# Patient Record
Sex: MALE | Race: BLACK OR AFRICAN AMERICAN | NOT HISPANIC OR LATINO | Employment: OTHER | URBAN - NONMETROPOLITAN AREA
[De-identification: names, ages, dates, MRNs, and addresses within clinical notes are randomized per-mention and may not be internally consistent; named-entity substitution may affect disease eponyms.]

---

## 2017-01-11 ENCOUNTER — OFFICE VISIT (OUTPATIENT)
Dept: CARDIOLOGY | Facility: CLINIC | Age: 72
End: 2017-01-11

## 2017-01-11 VITALS
WEIGHT: 140 LBS | HEIGHT: 71 IN | SYSTOLIC BLOOD PRESSURE: 160 MMHG | BODY MASS INDEX: 19.6 KG/M2 | OXYGEN SATURATION: 98 % | DIASTOLIC BLOOD PRESSURE: 92 MMHG | HEART RATE: 85 BPM

## 2017-01-11 DIAGNOSIS — F17.200 SMOKER: ICD-10-CM

## 2017-01-11 DIAGNOSIS — N18.9 CKD (CHRONIC KIDNEY DISEASE), UNSPECIFIED STAGE: ICD-10-CM

## 2017-01-11 DIAGNOSIS — I10 ESSENTIAL HYPERTENSION: ICD-10-CM

## 2017-01-11 DIAGNOSIS — I25.119 CORONARY ARTERY DISEASE INVOLVING NATIVE CORONARY ARTERY OF NATIVE HEART WITH ANGINA PECTORIS (HCC): ICD-10-CM

## 2017-01-11 DIAGNOSIS — I20.9 ISCHEMIC CHEST PAIN (HCC): Primary | ICD-10-CM

## 2017-01-11 PROCEDURE — 99214 OFFICE O/P EST MOD 30 MIN: CPT | Performed by: INTERNAL MEDICINE

## 2017-01-11 RX ORDER — ALLOPURINOL 100 MG/1
100 TABLET ORAL DAILY
COMMUNITY

## 2017-01-11 RX ORDER — CARVEDILOL 25 MG/1
25 TABLET ORAL 2 TIMES DAILY WITH MEALS
Status: ON HOLD | COMMUNITY
End: 2019-04-12 | Stop reason: SDUPTHER

## 2017-01-11 RX ORDER — HYDRALAZINE HYDROCHLORIDE 50 MG/1
100 TABLET, FILM COATED ORAL 2 TIMES DAILY
COMMUNITY
End: 2019-04-12 | Stop reason: HOSPADM

## 2017-01-11 RX ORDER — ASPIRIN 325 MG
325 TABLET ORAL DAILY
Status: ON HOLD | COMMUNITY
End: 2017-01-19

## 2017-01-11 RX ORDER — NIACIN 500 MG
500 TABLET ORAL NIGHTLY
COMMUNITY
End: 2021-01-01

## 2017-01-11 RX ORDER — VITAMIN E 268 MG
400 CAPSULE ORAL DAILY
COMMUNITY

## 2017-01-11 RX ORDER — FLUTICASONE PROPIONATE 50 MCG
2 SPRAY, SUSPENSION (ML) NASAL DAILY
COMMUNITY

## 2017-01-11 RX ORDER — SODIUM CHLORIDE 0.9 % (FLUSH) 0.9 %
1-10 SYRINGE (ML) INJECTION AS NEEDED
Status: CANCELLED | OUTPATIENT
Start: 2017-01-11

## 2017-01-11 RX ORDER — HYDROCHLOROTHIAZIDE 25 MG/1
25 TABLET ORAL DAILY
COMMUNITY
End: 2021-01-01

## 2017-01-11 RX ORDER — PRASUGREL 10 MG/1
10 TABLET, FILM COATED ORAL DAILY
Status: ON HOLD | COMMUNITY
End: 2017-01-19

## 2017-01-11 RX ORDER — LISINOPRIL 40 MG/1
40 TABLET ORAL DAILY
COMMUNITY
End: 2021-01-01

## 2017-01-11 RX ORDER — SIMVASTATIN 80 MG
40 TABLET ORAL NIGHTLY
COMMUNITY
End: 2019-04-12 | Stop reason: HOSPADM

## 2017-01-11 RX ORDER — SODIUM CHLORIDE 9 MG/ML
100 INJECTION, SOLUTION INTRAVENOUS ONCE
Status: CANCELLED | OUTPATIENT
Start: 2017-01-11 | End: 2017-01-11

## 2017-01-11 RX ORDER — NITROGLYCERIN 80 MG/1
1 PATCH TRANSDERMAL DAILY
COMMUNITY
End: 2019-04-08

## 2017-01-13 ENCOUNTER — TELEPHONE (OUTPATIENT)
Dept: CARDIOLOGY | Facility: CLINIC | Age: 72
End: 2017-01-13

## 2017-01-16 ENCOUNTER — HOSPITAL ENCOUNTER (INPATIENT)
Facility: HOSPITAL | Age: 72
LOS: 3 days | Discharge: HOME | End: 2017-01-19
Attending: INTERNAL MEDICINE | Admitting: INTERNAL MEDICINE

## 2017-01-16 DIAGNOSIS — I20.9 ISCHEMIC CHEST PAIN (HCC): ICD-10-CM

## 2017-01-16 PROBLEM — I20.8 ANGINA AT REST (HCC): Status: ACTIVE | Noted: 2017-01-16

## 2017-01-16 LAB
ALBUMIN SERPL-MCNC: 3.9 G/DL (ref 3.5–5)
ALBUMIN/GLOB SERPL: 1.1 G/DL (ref 1.1–2.5)
ALP SERPL-CCNC: 74 U/L (ref 24–120)
ALT SERPL W P-5'-P-CCNC: 32 U/L (ref 0–54)
ANION GAP SERPL CALCULATED.3IONS-SCNC: 13 MMOL/L (ref 4–13)
AST SERPL-CCNC: 46 U/L (ref 7–45)
BILIRUB SERPL-MCNC: 0.5 MG/DL (ref 0.1–1)
BUN BLD-MCNC: 30 MG/DL (ref 5–21)
BUN/CREAT SERPL: 12.4 (ref 7–25)
CALCIUM SPEC-SCNC: 9.3 MG/DL (ref 8.4–10.4)
CHLORIDE SERPL-SCNC: 105 MMOL/L (ref 98–110)
CO2 SERPL-SCNC: 25 MMOL/L (ref 24–31)
CREAT BLD-MCNC: 2.42 MG/DL (ref 0.5–1.4)
DEPRECATED RDW RBC AUTO: 45.3 FL (ref 40–54)
ERYTHROCYTE [DISTWIDTH] IN BLOOD BY AUTOMATED COUNT: 13.3 % (ref 12–15)
GFR SERPL CREATININE-BSD FRML MDRD: 32 ML/MIN/1.73
GLOBULIN UR ELPH-MCNC: 3.6 GM/DL
GLUCOSE BLD-MCNC: 84 MG/DL (ref 70–100)
HBA1C MFR BLD: 6.5 %
HCT VFR BLD AUTO: 30 % (ref 40–52)
HGB BLD-MCNC: 10.1 G/DL (ref 14–18)
MCH RBC QN AUTO: 31.5 PG (ref 28–32)
MCHC RBC AUTO-ENTMCNC: 33.7 G/DL (ref 33–36)
MCV RBC AUTO: 93.5 FL (ref 82–95)
PLATELET # BLD AUTO: 180 10*3/MM3 (ref 130–400)
PMV BLD AUTO: 9.6 FL (ref 6–12)
POTASSIUM BLD-SCNC: 4.2 MMOL/L (ref 3.5–5.3)
PROT SERPL-MCNC: 7.5 G/DL (ref 6.3–8.7)
RBC # BLD AUTO: 3.21 10*6/MM3 (ref 4.8–5.9)
SODIUM BLD-SCNC: 143 MMOL/L (ref 135–145)
TROPONIN I SERPL-MCNC: <0.012 NG/ML (ref 0–0.03)
WBC NRBC COR # BLD: 6.04 10*3/MM3 (ref 4.8–10.8)

## 2017-01-16 PROCEDURE — 93005 ELECTROCARDIOGRAM TRACING: CPT | Performed by: PHYSICIAN ASSISTANT

## 2017-01-16 PROCEDURE — 83036 HEMOGLOBIN GLYCOSYLATED A1C: CPT | Performed by: INTERNAL MEDICINE

## 2017-01-16 PROCEDURE — 85027 COMPLETE CBC AUTOMATED: CPT | Performed by: INTERNAL MEDICINE

## 2017-01-16 PROCEDURE — 25010000002 ENOXAPARIN PER 10 MG: Performed by: PHYSICIAN ASSISTANT

## 2017-01-16 PROCEDURE — 80053 COMPREHEN METABOLIC PANEL: CPT | Performed by: INTERNAL MEDICINE

## 2017-01-16 PROCEDURE — 99223 1ST HOSP IP/OBS HIGH 75: CPT | Performed by: PHYSICIAN ASSISTANT

## 2017-01-16 PROCEDURE — 84484 ASSAY OF TROPONIN QUANT: CPT | Performed by: PHYSICIAN ASSISTANT

## 2017-01-16 PROCEDURE — 93010 ELECTROCARDIOGRAM REPORT: CPT | Performed by: INTERNAL MEDICINE

## 2017-01-16 RX ORDER — ALLOPURINOL 100 MG/1
100 TABLET ORAL DAILY
Status: DISCONTINUED | OUTPATIENT
Start: 2017-01-16 | End: 2017-01-19 | Stop reason: HOSPADM

## 2017-01-16 RX ORDER — FLUTICASONE PROPIONATE 50 MCG
2 SPRAY, SUSPENSION (ML) NASAL DAILY
Status: DISCONTINUED | OUTPATIENT
Start: 2017-01-16 | End: 2017-01-19 | Stop reason: HOSPADM

## 2017-01-16 RX ORDER — NITROGLYCERIN 80 MG/1
1 PATCH TRANSDERMAL DAILY
Status: DISCONTINUED | OUTPATIENT
Start: 2017-01-16 | End: 2017-01-19 | Stop reason: HOSPADM

## 2017-01-16 RX ORDER — VITAMIN E 268 MG
400 CAPSULE ORAL DAILY
Status: DISCONTINUED | OUTPATIENT
Start: 2017-01-16 | End: 2017-01-19 | Stop reason: HOSPADM

## 2017-01-16 RX ORDER — CARVEDILOL 25 MG/1
25 TABLET ORAL 2 TIMES DAILY WITH MEALS
Status: DISCONTINUED | OUTPATIENT
Start: 2017-01-16 | End: 2017-01-19 | Stop reason: HOSPADM

## 2017-01-16 RX ORDER — SODIUM CHLORIDE 0.9 % (FLUSH) 0.9 %
1-10 SYRINGE (ML) INJECTION AS NEEDED
Status: DISCONTINUED | OUTPATIENT
Start: 2017-01-16 | End: 2017-01-17 | Stop reason: HOSPADM

## 2017-01-16 RX ORDER — LISINOPRIL 20 MG/1
40 TABLET ORAL DAILY
Status: DISCONTINUED | OUTPATIENT
Start: 2017-01-16 | End: 2017-01-19 | Stop reason: HOSPADM

## 2017-01-16 RX ORDER — HYDROCHLOROTHIAZIDE 25 MG/1
25 TABLET ORAL DAILY
Status: DISCONTINUED | OUTPATIENT
Start: 2017-01-16 | End: 2017-01-19 | Stop reason: HOSPADM

## 2017-01-16 RX ORDER — HYDRALAZINE HYDROCHLORIDE 50 MG/1
50 TABLET, FILM COATED ORAL EVERY 12 HOURS SCHEDULED
Status: DISCONTINUED | OUTPATIENT
Start: 2017-01-16 | End: 2017-01-19 | Stop reason: HOSPADM

## 2017-01-16 RX ORDER — ASPIRIN 325 MG
325 TABLET ORAL DAILY
Status: DISCONTINUED | OUTPATIENT
Start: 2017-01-16 | End: 2017-01-19 | Stop reason: HOSPADM

## 2017-01-16 RX ORDER — ACETYLCYSTEINE 200 MG/ML
600 SOLUTION ORAL; RESPIRATORY (INHALATION) EVERY 12 HOURS SCHEDULED
Status: DISCONTINUED | OUTPATIENT
Start: 2017-01-16 | End: 2017-01-19 | Stop reason: HOSPADM

## 2017-01-16 RX ORDER — ASCORBIC ACID 500 MG
500 TABLET ORAL DAILY
Status: DISCONTINUED | OUTPATIENT
Start: 2017-01-16 | End: 2017-01-19 | Stop reason: HOSPADM

## 2017-01-16 RX ORDER — SODIUM CHLORIDE 9 MG/ML
100 INJECTION, SOLUTION INTRAVENOUS ONCE
Status: COMPLETED | OUTPATIENT
Start: 2017-01-16 | End: 2017-01-17

## 2017-01-16 RX ORDER — SODIUM CHLORIDE 9 MG/ML
75 INJECTION, SOLUTION INTRAVENOUS CONTINUOUS
Status: DISCONTINUED | OUTPATIENT
Start: 2017-01-16 | End: 2017-01-18

## 2017-01-16 RX ORDER — ATORVASTATIN CALCIUM 40 MG/1
40 TABLET, FILM COATED ORAL NIGHTLY
Status: DISCONTINUED | OUTPATIENT
Start: 2017-01-16 | End: 2017-01-19 | Stop reason: HOSPADM

## 2017-01-16 RX ORDER — PRASUGREL 10 MG/1
10 TABLET, FILM COATED ORAL DAILY
Status: DISCONTINUED | OUTPATIENT
Start: 2017-01-16 | End: 2017-01-19 | Stop reason: HOSPADM

## 2017-01-16 RX ADMIN — VITAMIN E CAP 400 UNIT 400 UNITS: 400 CAP at 17:27

## 2017-01-16 RX ADMIN — CARVEDILOL 25 MG: 25 TABLET, FILM COATED ORAL at 17:30

## 2017-01-16 RX ADMIN — SODIUM CHLORIDE 75 ML/HR: 9 INJECTION, SOLUTION INTRAVENOUS at 11:35

## 2017-01-16 RX ADMIN — ASPIRIN 325 MG: 325 TABLET, COATED ORAL at 17:36

## 2017-01-16 RX ADMIN — HYDRALAZINE HYDROCHLORIDE 50 MG: 50 TABLET ORAL at 22:14

## 2017-01-16 RX ADMIN — ENOXAPARIN SODIUM 30 MG: 30 INJECTION SUBCUTANEOUS at 11:42

## 2017-01-16 RX ADMIN — OXYCODONE HYDROCHLORIDE AND ACETAMINOPHEN 500 MG: 500 TABLET ORAL at 17:27

## 2017-01-16 RX ADMIN — SODIUM CHLORIDE 75 ML/HR: 9 INJECTION, SOLUTION INTRAVENOUS at 22:20

## 2017-01-16 RX ADMIN — ACETYLCYSTEINE 600 MG: 200 SOLUTION ORAL; RESPIRATORY (INHALATION) at 22:15

## 2017-01-16 RX ADMIN — PRASUGREL HYDROCHLORIDE 10 MG: 10 TABLET, FILM COATED ORAL at 17:26

## 2017-01-16 RX ADMIN — DESMOPRESSIN ACETATE 40 MG: 0.2 TABLET ORAL at 22:15

## 2017-01-17 LAB
25(OH)D3 SERPL-MCNC: 52.1 NG/ML (ref 30–100)
ANION GAP SERPL CALCULATED.3IONS-SCNC: 10 MMOL/L (ref 4–13)
BASOPHILS # BLD AUTO: 0.01 10*3/MM3 (ref 0–0.2)
BASOPHILS NFR BLD AUTO: 0.2 % (ref 0–2)
BUN BLD-MCNC: 31 MG/DL (ref 5–21)
BUN/CREAT SERPL: 12.8 (ref 7–25)
CALCIUM SPEC-SCNC: 8.7 MG/DL (ref 8.4–10.4)
CHLORIDE SERPL-SCNC: 107 MMOL/L (ref 98–110)
CO2 SERPL-SCNC: 26 MMOL/L (ref 24–31)
CREAT BLD-MCNC: 2.42 MG/DL (ref 0.5–1.4)
DEPRECATED RDW RBC AUTO: 46.3 FL (ref 40–54)
EOSINOPHIL # BLD AUTO: 0.19 10*3/MM3 (ref 0–0.7)
EOSINOPHIL NFR BLD AUTO: 3.6 % (ref 0–4)
ERYTHROCYTE [DISTWIDTH] IN BLOOD BY AUTOMATED COUNT: 13.4 % (ref 12–15)
GFR SERPL CREATININE-BSD FRML MDRD: 32 ML/MIN/1.73
GLUCOSE BLD-MCNC: 95 MG/DL (ref 70–100)
HCT VFR BLD AUTO: 27.9 % (ref 40–52)
HGB BLD-MCNC: 9.4 G/DL (ref 14–18)
IMM GRANULOCYTES # BLD: 0.02 10*3/MM3 (ref 0–0.03)
IMM GRANULOCYTES NFR BLD: 0.4 % (ref 0–5)
LYMPHOCYTES # BLD AUTO: 1.51 10*3/MM3 (ref 0.72–4.86)
LYMPHOCYTES NFR BLD AUTO: 28.5 % (ref 15–45)
MAGNESIUM SERPL-MCNC: 1.3 MG/DL (ref 1.4–2.2)
MCH RBC QN AUTO: 31.5 PG (ref 28–32)
MCHC RBC AUTO-ENTMCNC: 33.7 G/DL (ref 33–36)
MCV RBC AUTO: 93.6 FL (ref 82–95)
MONOCYTES # BLD AUTO: 0.42 10*3/MM3 (ref 0.19–1.3)
MONOCYTES NFR BLD AUTO: 7.9 % (ref 4–12)
NEUTROPHILS # BLD AUTO: 3.15 10*3/MM3 (ref 1.87–8.4)
NEUTROPHILS NFR BLD AUTO: 59.4 % (ref 39–78)
PHOSPHATE SERPL-MCNC: 3.5 MG/DL (ref 2.5–4.5)
PLATELET # BLD AUTO: 178 10*3/MM3 (ref 130–400)
PMV BLD AUTO: 9.5 FL (ref 6–12)
POTASSIUM BLD-SCNC: 4.2 MMOL/L (ref 3.5–5.3)
PTH-INTACT SERPL-MCNC: 140.6 PG/ML (ref 7.5–53.5)
RBC # BLD AUTO: 2.98 10*6/MM3 (ref 4.8–5.9)
SODIUM BLD-SCNC: 143 MMOL/L (ref 135–145)
URATE SERPL-MCNC: 6.1 MG/DL (ref 3.5–8.5)
WBC NRBC COR # BLD: 5.3 10*3/MM3 (ref 4.8–10.8)

## 2017-01-17 PROCEDURE — 84550 ASSAY OF BLOOD/URIC ACID: CPT | Performed by: INTERNAL MEDICINE

## 2017-01-17 PROCEDURE — 25010000002 DIPHENHYDRAMINE PER 50 MG: Performed by: INTERNAL MEDICINE

## 2017-01-17 PROCEDURE — 25010000002 MIDAZOLAM PER 1 MG: Performed by: INTERNAL MEDICINE

## 2017-01-17 PROCEDURE — C1887 CATHETER, GUIDING: HCPCS | Performed by: INTERNAL MEDICINE

## 2017-01-17 PROCEDURE — 83735 ASSAY OF MAGNESIUM: CPT | Performed by: INTERNAL MEDICINE

## 2017-01-17 PROCEDURE — C9600 PERC DRUG-EL COR STENT SING: HCPCS | Performed by: INTERNAL MEDICINE

## 2017-01-17 PROCEDURE — 25010000002 BIVALIRUDIN: Performed by: INTERNAL MEDICINE

## 2017-01-17 PROCEDURE — 93010 ELECTROCARDIOGRAM REPORT: CPT | Performed by: INTERNAL MEDICINE

## 2017-01-17 PROCEDURE — 027034Z DILATION OF CORONARY ARTERY, ONE ARTERY WITH DRUG-ELUTING INTRALUMINAL DEVICE, PERCUTANEOUS APPROACH: ICD-10-PCS | Performed by: INTERNAL MEDICINE

## 2017-01-17 PROCEDURE — B2111ZZ FLUOROSCOPY OF MULTIPLE CORONARY ARTERIES USING LOW OSMOLAR CONTRAST: ICD-10-PCS | Performed by: INTERNAL MEDICINE

## 2017-01-17 PROCEDURE — 92928 PRQ TCAT PLMT NTRAC ST 1 LES: CPT | Performed by: INTERNAL MEDICINE

## 2017-01-17 PROCEDURE — 25010000002 MAGNESIUM SULFATE PER 500 MG OF MAGNESIUM: Performed by: INTERNAL MEDICINE

## 2017-01-17 PROCEDURE — C1769 GUIDE WIRE: HCPCS | Performed by: INTERNAL MEDICINE

## 2017-01-17 PROCEDURE — 84100 ASSAY OF PHOSPHORUS: CPT | Performed by: INTERNAL MEDICINE

## 2017-01-17 PROCEDURE — C1760 CLOSURE DEV, VASC: HCPCS | Performed by: INTERNAL MEDICINE

## 2017-01-17 PROCEDURE — B2151ZZ FLUOROSCOPY OF LEFT HEART USING LOW OSMOLAR CONTRAST: ICD-10-PCS | Performed by: INTERNAL MEDICINE

## 2017-01-17 PROCEDURE — 25010000002 ENOXAPARIN PER 10 MG: Performed by: PHYSICIAN ASSISTANT

## 2017-01-17 PROCEDURE — 93005 ELECTROCARDIOGRAM TRACING: CPT | Performed by: INTERNAL MEDICINE

## 2017-01-17 PROCEDURE — C1874 STENT, COATED/COV W/DEL SYS: HCPCS | Performed by: INTERNAL MEDICINE

## 2017-01-17 PROCEDURE — 83970 ASSAY OF PARATHORMONE: CPT | Performed by: INTERNAL MEDICINE

## 2017-01-17 PROCEDURE — 25010000002 FENTANYL CITRATE (PF) 100 MCG/2ML SOLUTION: Performed by: INTERNAL MEDICINE

## 2017-01-17 PROCEDURE — 93458 L HRT ARTERY/VENTRICLE ANGIO: CPT | Performed by: INTERNAL MEDICINE

## 2017-01-17 PROCEDURE — 85025 COMPLETE CBC W/AUTO DIFF WBC: CPT | Performed by: INTERNAL MEDICINE

## 2017-01-17 PROCEDURE — 0 IOPAMIDOL 61 % SOLUTION: Performed by: INTERNAL MEDICINE

## 2017-01-17 PROCEDURE — 82306 VITAMIN D 25 HYDROXY: CPT | Performed by: INTERNAL MEDICINE

## 2017-01-17 PROCEDURE — 80048 BASIC METABOLIC PNL TOTAL CA: CPT | Performed by: PHYSICIAN ASSISTANT

## 2017-01-17 PROCEDURE — 94799 UNLISTED PULMONARY SVC/PX: CPT

## 2017-01-17 PROCEDURE — 4A023N7 MEASUREMENT OF CARDIAC SAMPLING AND PRESSURE, LEFT HEART, PERCUTANEOUS APPROACH: ICD-10-PCS | Performed by: INTERNAL MEDICINE

## 2017-01-17 PROCEDURE — C1894 INTRO/SHEATH, NON-LASER: HCPCS | Performed by: INTERNAL MEDICINE

## 2017-01-17 DEVICE — XIENCE ALPINE EVEROLIMUS ELUTING CORONARY STENT SYSTEM 2.50 MM X 12 MM / RAPID-EXCHANGE
Type: IMPLANTABLE DEVICE | Status: FUNCTIONAL
Brand: XIENCE ALPINE

## 2017-01-17 RX ORDER — FENTANYL CITRATE 50 UG/ML
INJECTION, SOLUTION INTRAMUSCULAR; INTRAVENOUS AS NEEDED
Status: DISCONTINUED | OUTPATIENT
Start: 2017-01-17 | End: 2017-01-17 | Stop reason: HOSPADM

## 2017-01-17 RX ORDER — PRASUGREL 5 MG/1
TABLET, FILM COATED ORAL AS NEEDED
Status: DISCONTINUED | OUTPATIENT
Start: 2017-01-17 | End: 2017-01-17 | Stop reason: HOSPADM

## 2017-01-17 RX ORDER — DIPHENHYDRAMINE HYDROCHLORIDE 50 MG/ML
INJECTION INTRAMUSCULAR; INTRAVENOUS AS NEEDED
Status: DISCONTINUED | OUTPATIENT
Start: 2017-01-17 | End: 2017-01-17 | Stop reason: HOSPADM

## 2017-01-17 RX ORDER — SODIUM CHLORIDE 9 MG/ML
100 INJECTION, SOLUTION INTRAVENOUS CONTINUOUS
Status: DISCONTINUED | OUTPATIENT
Start: 2017-01-17 | End: 2017-01-18

## 2017-01-17 RX ORDER — SODIUM CHLORIDE 0.9 % (FLUSH) 0.9 %
1-10 SYRINGE (ML) INJECTION AS NEEDED
Status: DISCONTINUED | OUTPATIENT
Start: 2017-01-17 | End: 2017-01-19 | Stop reason: HOSPADM

## 2017-01-17 RX ORDER — LABETALOL HYDROCHLORIDE 5 MG/ML
INJECTION, SOLUTION INTRAVENOUS AS NEEDED
Status: DISCONTINUED | OUTPATIENT
Start: 2017-01-17 | End: 2017-01-17 | Stop reason: HOSPADM

## 2017-01-17 RX ORDER — MIDAZOLAM HYDROCHLORIDE 1 MG/ML
INJECTION INTRAMUSCULAR; INTRAVENOUS AS NEEDED
Status: DISCONTINUED | OUTPATIENT
Start: 2017-01-17 | End: 2017-01-17 | Stop reason: HOSPADM

## 2017-01-17 RX ORDER — LIDOCAINE HYDROCHLORIDE 20 MG/ML
INJECTION, SOLUTION INFILTRATION; PERINEURAL AS NEEDED
Status: DISCONTINUED | OUTPATIENT
Start: 2017-01-17 | End: 2017-01-17 | Stop reason: HOSPADM

## 2017-01-17 RX ADMIN — LISINOPRIL 40 MG: 20 TABLET ORAL at 09:48

## 2017-01-17 RX ADMIN — ACETYLCYSTEINE 600 MG: 200 SOLUTION ORAL; RESPIRATORY (INHALATION) at 12:35

## 2017-01-17 RX ADMIN — HYDRALAZINE HYDROCHLORIDE 50 MG: 50 TABLET ORAL at 20:46

## 2017-01-17 RX ADMIN — CARVEDILOL 25 MG: 25 TABLET, FILM COATED ORAL at 09:48

## 2017-01-17 RX ADMIN — ENOXAPARIN SODIUM 30 MG: 30 INJECTION SUBCUTANEOUS at 11:49

## 2017-01-17 RX ADMIN — MAGNESIUM SULFATE HEPTAHYDRATE 1 G: 500 INJECTION, SOLUTION INTRAMUSCULAR; INTRAVENOUS at 20:47

## 2017-01-17 RX ADMIN — DESMOPRESSIN ACETATE 40 MG: 0.2 TABLET ORAL at 20:46

## 2017-01-17 RX ADMIN — SODIUM CHLORIDE 100 ML/HR: 9 INJECTION, SOLUTION INTRAVENOUS at 13:04

## 2017-01-17 RX ADMIN — OXYCODONE HYDROCHLORIDE AND ACETAMINOPHEN 500 MG: 500 TABLET ORAL at 09:48

## 2017-01-17 RX ADMIN — PRASUGREL HYDROCHLORIDE 10 MG: 10 TABLET, FILM COATED ORAL at 09:47

## 2017-01-17 RX ADMIN — NICARDIPINE HYDROCHLORIDE 10 MG/HR: 25 INJECTION INTRAVENOUS at 14:39

## 2017-01-17 RX ADMIN — ALLOPURINOL 100 MG: 100 TABLET ORAL at 09:47

## 2017-01-17 RX ADMIN — SODIUM CHLORIDE 100 ML/HR: 9 INJECTION, SOLUTION INTRAVENOUS at 15:24

## 2017-01-17 RX ADMIN — CARVEDILOL 25 MG: 25 TABLET, FILM COATED ORAL at 17:29

## 2017-01-17 RX ADMIN — HYDROCHLOROTHIAZIDE 25 MG: 25 TABLET ORAL at 09:47

## 2017-01-17 RX ADMIN — ACETYLCYSTEINE 600 MG: 200 SOLUTION ORAL; RESPIRATORY (INHALATION) at 20:46

## 2017-01-17 RX ADMIN — HYDRALAZINE HYDROCHLORIDE 50 MG: 50 TABLET ORAL at 09:47

## 2017-01-17 RX ADMIN — FLUTICASONE PROPIONATE 2 SPRAY: 50 SPRAY, METERED NASAL at 09:50

## 2017-01-17 RX ADMIN — ASPIRIN 325 MG: 325 TABLET, COATED ORAL at 09:48

## 2017-01-17 RX ADMIN — SODIUM CHLORIDE 100 ML/HR: 9 INJECTION, SOLUTION INTRAVENOUS at 16:36

## 2017-01-17 RX ADMIN — VITAMIN E CAP 400 UNIT 400 UNITS: 400 CAP at 09:47

## 2017-01-18 LAB
ANION GAP SERPL CALCULATED.3IONS-SCNC: 10 MMOL/L (ref 4–13)
ARTICHOKE IGE QN: 57 MG/DL (ref 0–99)
BUN BLD-MCNC: 27 MG/DL (ref 5–21)
BUN/CREAT SERPL: 13.3 (ref 7–25)
CALCIUM SPEC-SCNC: 8.3 MG/DL (ref 8.4–10.4)
CHLORIDE SERPL-SCNC: 109 MMOL/L (ref 98–110)
CHOLEST SERPL-MCNC: 109 MG/DL (ref 130–200)
CO2 SERPL-SCNC: 23 MMOL/L (ref 24–31)
CREAT BLD-MCNC: 2.03 MG/DL (ref 0.5–1.4)
DEPRECATED RDW RBC AUTO: 45.5 FL (ref 40–54)
ERYTHROCYTE [DISTWIDTH] IN BLOOD BY AUTOMATED COUNT: 13.5 % (ref 12–15)
GFR SERPL CREATININE-BSD FRML MDRD: 40 ML/MIN/1.73
GLUCOSE BLD-MCNC: 95 MG/DL (ref 70–100)
HCT VFR BLD AUTO: 24.8 % (ref 40–52)
HDLC SERPL-MCNC: 39 MG/DL
HGB BLD-MCNC: 8.6 G/DL (ref 14–18)
LDLC/HDLC SERPL: 1.57 {RATIO}
MAGNESIUM SERPL-MCNC: 1.4 MG/DL (ref 1.4–2.2)
MCH RBC QN AUTO: 32.2 PG (ref 28–32)
MCHC RBC AUTO-ENTMCNC: 34.7 G/DL (ref 33–36)
MCV RBC AUTO: 92.9 FL (ref 82–95)
PLATELET # BLD AUTO: 150 10*3/MM3 (ref 130–400)
PMV BLD AUTO: 9.4 FL (ref 6–12)
POTASSIUM BLD-SCNC: 4.8 MMOL/L (ref 3.5–5.3)
RBC # BLD AUTO: 2.67 10*6/MM3 (ref 4.8–5.9)
SODIUM BLD-SCNC: 142 MMOL/L (ref 135–145)
TRIGL SERPL-MCNC: 44 MG/DL (ref 0–149)
WBC NRBC COR # BLD: 5.14 10*3/MM3 (ref 4.8–10.8)

## 2017-01-18 PROCEDURE — 85027 COMPLETE CBC AUTOMATED: CPT | Performed by: INTERNAL MEDICINE

## 2017-01-18 PROCEDURE — 83735 ASSAY OF MAGNESIUM: CPT | Performed by: INTERNAL MEDICINE

## 2017-01-18 PROCEDURE — 80061 LIPID PANEL: CPT | Performed by: INTERNAL MEDICINE

## 2017-01-18 PROCEDURE — 93005 ELECTROCARDIOGRAM TRACING: CPT | Performed by: INTERNAL MEDICINE

## 2017-01-18 PROCEDURE — 80048 BASIC METABOLIC PNL TOTAL CA: CPT | Performed by: PHYSICIAN ASSISTANT

## 2017-01-18 PROCEDURE — 99233 SBSQ HOSP IP/OBS HIGH 50: CPT | Performed by: INTERNAL MEDICINE

## 2017-01-18 PROCEDURE — 93010 ELECTROCARDIOGRAM REPORT: CPT | Performed by: INTERNAL MEDICINE

## 2017-01-18 RX ADMIN — HYDROCHLOROTHIAZIDE 25 MG: 25 TABLET ORAL at 08:38

## 2017-01-18 RX ADMIN — ALLOPURINOL 100 MG: 100 TABLET ORAL at 08:37

## 2017-01-18 RX ADMIN — HYDRALAZINE HYDROCHLORIDE 50 MG: 50 TABLET ORAL at 20:19

## 2017-01-18 RX ADMIN — LISINOPRIL 40 MG: 20 TABLET ORAL at 08:37

## 2017-01-18 RX ADMIN — ASPIRIN 325 MG: 325 TABLET, COATED ORAL at 08:37

## 2017-01-18 RX ADMIN — OXYCODONE HYDROCHLORIDE AND ACETAMINOPHEN 500 MG: 500 TABLET ORAL at 08:37

## 2017-01-18 RX ADMIN — PRASUGREL HYDROCHLORIDE 10 MG: 10 TABLET, FILM COATED ORAL at 08:37

## 2017-01-18 RX ADMIN — FLUTICASONE PROPIONATE 2 SPRAY: 50 SPRAY, METERED NASAL at 08:37

## 2017-01-18 RX ADMIN — HYDRALAZINE HYDROCHLORIDE 50 MG: 50 TABLET ORAL at 08:37

## 2017-01-18 RX ADMIN — CARVEDILOL 25 MG: 25 TABLET, FILM COATED ORAL at 17:27

## 2017-01-18 RX ADMIN — CARVEDILOL 25 MG: 25 TABLET, FILM COATED ORAL at 08:37

## 2017-01-18 RX ADMIN — SODIUM CHLORIDE 100 ML/HR: 9 INJECTION, SOLUTION INTRAVENOUS at 06:47

## 2017-01-18 RX ADMIN — ACETYLCYSTEINE 600 MG: 200 SOLUTION ORAL; RESPIRATORY (INHALATION) at 20:19

## 2017-01-18 RX ADMIN — ACETYLCYSTEINE 600 MG: 200 SOLUTION ORAL; RESPIRATORY (INHALATION) at 08:36

## 2017-01-18 RX ADMIN — VITAMIN E CAP 400 UNIT 400 UNITS: 400 CAP at 08:37

## 2017-01-18 RX ADMIN — DESMOPRESSIN ACETATE 40 MG: 0.2 TABLET ORAL at 20:19

## 2017-01-19 VITALS
TEMPERATURE: 98.8 F | BODY MASS INDEX: 20.36 KG/M2 | WEIGHT: 145.44 LBS | SYSTOLIC BLOOD PRESSURE: 157 MMHG | OXYGEN SATURATION: 97 % | HEIGHT: 71 IN | RESPIRATION RATE: 18 BRPM | DIASTOLIC BLOOD PRESSURE: 77 MMHG | HEART RATE: 75 BPM

## 2017-01-19 LAB
ANION GAP SERPL CALCULATED.3IONS-SCNC: 9 MMOL/L (ref 4–13)
BASOPHILS # BLD AUTO: 0.01 10*3/MM3 (ref 0–0.2)
BASOPHILS NFR BLD AUTO: 0.2 % (ref 0–2)
BUN BLD-MCNC: 27 MG/DL (ref 5–21)
BUN/CREAT SERPL: 13.1 (ref 7–25)
CALCIUM SPEC-SCNC: 8.5 MG/DL (ref 8.4–10.4)
CHLORIDE SERPL-SCNC: 106 MMOL/L (ref 98–110)
CO2 SERPL-SCNC: 23 MMOL/L (ref 24–31)
CREAT BLD-MCNC: 2.06 MG/DL (ref 0.5–1.4)
DEPRECATED RDW RBC AUTO: 38.9 FL (ref 40–54)
EOSINOPHIL # BLD AUTO: 0.23 10*3/MM3 (ref 0–0.7)
EOSINOPHIL NFR BLD AUTO: 3.6 % (ref 0–4)
ERYTHROCYTE [DISTWIDTH] IN BLOOD BY AUTOMATED COUNT: 12.1 % (ref 12–15)
GASTROCULT GAST QL: NEGATIVE
GFR SERPL CREATININE-BSD FRML MDRD: 39 ML/MIN/1.73
GLUCOSE BLD-MCNC: 92 MG/DL (ref 70–100)
HCT VFR BLD AUTO: 24.2 % (ref 40–52)
HGB BLD-MCNC: 8.4 G/DL (ref 14–18)
LYMPHOCYTES # BLD AUTO: 1.24 10*3/MM3 (ref 0.72–4.86)
LYMPHOCYTES NFR BLD AUTO: 19.2 % (ref 15–45)
MCH RBC QN AUTO: 31.9 PG (ref 28–32)
MCHC RBC AUTO-ENTMCNC: 34.7 G/DL (ref 33–36)
MCV RBC AUTO: 92 FL (ref 82–95)
MONOCYTES # BLD AUTO: 0.73 10*3/MM3 (ref 0.19–1.3)
MONOCYTES NFR BLD AUTO: 11.3 % (ref 4–12)
NEUTROPHILS # BLD AUTO: 4.24 10*3/MM3 (ref 1.87–8.4)
NEUTROPHILS NFR BLD AUTO: 65.7 % (ref 39–78)
PLATELET # BLD AUTO: 172 10*3/MM3 (ref 130–400)
PMV BLD AUTO: 9.6 FL (ref 6–12)
POTASSIUM BLD-SCNC: 4.7 MMOL/L (ref 3.5–5.3)
RBC # BLD AUTO: 2.63 10*6/MM3 (ref 4.8–5.9)
SODIUM BLD-SCNC: 138 MMOL/L (ref 135–145)
WBC NRBC COR # BLD: 6.45 10*3/MM3 (ref 4.8–10.8)

## 2017-01-19 PROCEDURE — 82270 OCCULT BLOOD FECES: CPT | Performed by: NURSE PRACTITIONER

## 2017-01-19 PROCEDURE — 80048 BASIC METABOLIC PNL TOTAL CA: CPT | Performed by: PHYSICIAN ASSISTANT

## 2017-01-19 PROCEDURE — 85025 COMPLETE CBC W/AUTO DIFF WBC: CPT | Performed by: INTERNAL MEDICINE

## 2017-01-19 RX ORDER — PRASUGREL 10 MG/1
10 TABLET, FILM COATED ORAL DAILY
Qty: 30 TABLET | Refills: 11 | Status: ON HOLD | OUTPATIENT
Start: 2017-01-19 | End: 2019-04-12 | Stop reason: SDUPTHER

## 2017-01-19 RX ADMIN — ASPIRIN 325 MG: 325 TABLET, COATED ORAL at 08:29

## 2017-01-19 RX ADMIN — CARVEDILOL 25 MG: 25 TABLET, FILM COATED ORAL at 08:30

## 2017-01-19 RX ADMIN — PRASUGREL HYDROCHLORIDE 10 MG: 10 TABLET, FILM COATED ORAL at 08:29

## 2017-01-19 RX ADMIN — LISINOPRIL 40 MG: 20 TABLET ORAL at 08:29

## 2017-01-19 RX ADMIN — ALLOPURINOL 100 MG: 100 TABLET ORAL at 08:29

## 2017-01-19 RX ADMIN — OXYCODONE HYDROCHLORIDE AND ACETAMINOPHEN 500 MG: 500 TABLET ORAL at 08:30

## 2017-01-19 RX ADMIN — HYDRALAZINE HYDROCHLORIDE 50 MG: 50 TABLET ORAL at 08:29

## 2017-01-19 RX ADMIN — VITAMIN E CAP 400 UNIT 400 UNITS: 400 CAP at 08:29

## 2017-01-19 RX ADMIN — ACETYLCYSTEINE 600 MG: 200 SOLUTION ORAL; RESPIRATORY (INHALATION) at 08:30

## 2017-01-19 RX ADMIN — HYDROCHLOROTHIAZIDE 25 MG: 25 TABLET ORAL at 08:30

## 2017-01-19 RX ADMIN — FLUTICASONE PROPIONATE 2 SPRAY: 50 SPRAY, METERED NASAL at 08:31

## 2017-02-20 ENCOUNTER — OFFICE VISIT (OUTPATIENT)
Dept: CARDIOLOGY | Facility: CLINIC | Age: 72
End: 2017-02-20

## 2017-02-20 VITALS
HEIGHT: 71 IN | OXYGEN SATURATION: 99 % | SYSTOLIC BLOOD PRESSURE: 136 MMHG | BODY MASS INDEX: 19.6 KG/M2 | DIASTOLIC BLOOD PRESSURE: 66 MMHG | WEIGHT: 140 LBS | HEART RATE: 74 BPM

## 2017-02-20 DIAGNOSIS — I10 ESSENTIAL HYPERTENSION: ICD-10-CM

## 2017-02-20 DIAGNOSIS — I25.119 CORONARY ARTERY DISEASE INVOLVING NATIVE CORONARY ARTERY OF NATIVE HEART WITH ANGINA PECTORIS (HCC): ICD-10-CM

## 2017-02-20 DIAGNOSIS — F17.200 SMOKER: Primary | ICD-10-CM

## 2017-02-20 DIAGNOSIS — I25.10 CORONARY ARTERY DISEASE INVOLVING NATIVE CORONARY ARTERY OF NATIVE HEART WITHOUT ANGINA PECTORIS: ICD-10-CM

## 2017-02-20 PROCEDURE — 99214 OFFICE O/P EST MOD 30 MIN: CPT | Performed by: INTERNAL MEDICINE

## 2017-02-20 RX ORDER — FERROUS SULFATE 325(65) MG
325 TABLET ORAL
COMMUNITY

## 2017-02-20 RX ORDER — FAMOTIDINE 20 MG
2000 TABLET ORAL DAILY
COMMUNITY

## 2017-02-22 ENCOUNTER — TREATMENT (OUTPATIENT)
Dept: CARDIAC REHAB | Facility: HOSPITAL | Age: 72
End: 2017-02-22

## 2017-02-22 DIAGNOSIS — Z95.5 S/P DRUG ELUTING CORONARY STENT PLACEMENT: Primary | ICD-10-CM

## 2017-02-22 PROCEDURE — 93798 PHYS/QHP OP CAR RHAB W/ECG: CPT

## 2017-02-24 ENCOUNTER — TREATMENT (OUTPATIENT)
Dept: CARDIAC REHAB | Facility: HOSPITAL | Age: 72
End: 2017-02-24

## 2017-02-24 DIAGNOSIS — Z95.5 S/P DRUG ELUTING CORONARY STENT PLACEMENT: Primary | ICD-10-CM

## 2017-02-24 PROCEDURE — 93798 PHYS/QHP OP CAR RHAB W/ECG: CPT

## 2017-02-27 ENCOUNTER — APPOINTMENT (OUTPATIENT)
Dept: CARDIAC REHAB | Facility: HOSPITAL | Age: 72
End: 2017-02-27

## 2017-03-15 ENCOUNTER — APPOINTMENT (OUTPATIENT)
Dept: CARDIAC REHAB | Facility: HOSPITAL | Age: 72
End: 2017-03-15

## 2017-03-17 ENCOUNTER — APPOINTMENT (OUTPATIENT)
Dept: CARDIAC REHAB | Facility: HOSPITAL | Age: 72
End: 2017-03-17

## 2017-03-20 ENCOUNTER — APPOINTMENT (OUTPATIENT)
Dept: CARDIAC REHAB | Facility: HOSPITAL | Age: 72
End: 2017-03-20

## 2017-03-22 ENCOUNTER — APPOINTMENT (OUTPATIENT)
Dept: CARDIAC REHAB | Facility: HOSPITAL | Age: 72
End: 2017-03-22

## 2017-03-24 ENCOUNTER — APPOINTMENT (OUTPATIENT)
Dept: CARDIAC REHAB | Facility: HOSPITAL | Age: 72
End: 2017-03-24

## 2017-03-27 ENCOUNTER — APPOINTMENT (OUTPATIENT)
Dept: CARDIAC REHAB | Facility: HOSPITAL | Age: 72
End: 2017-03-27

## 2017-03-29 ENCOUNTER — APPOINTMENT (OUTPATIENT)
Dept: CARDIAC REHAB | Facility: HOSPITAL | Age: 72
End: 2017-03-29

## 2017-03-31 ENCOUNTER — APPOINTMENT (OUTPATIENT)
Dept: CARDIAC REHAB | Facility: HOSPITAL | Age: 72
End: 2017-03-31

## 2017-04-03 ENCOUNTER — APPOINTMENT (OUTPATIENT)
Dept: CARDIAC REHAB | Facility: HOSPITAL | Age: 72
End: 2017-04-03

## 2017-04-05 ENCOUNTER — APPOINTMENT (OUTPATIENT)
Dept: CARDIAC REHAB | Facility: HOSPITAL | Age: 72
End: 2017-04-05

## 2017-04-07 ENCOUNTER — APPOINTMENT (OUTPATIENT)
Dept: CARDIAC REHAB | Facility: HOSPITAL | Age: 72
End: 2017-04-07

## 2017-04-10 ENCOUNTER — APPOINTMENT (OUTPATIENT)
Dept: CARDIAC REHAB | Facility: HOSPITAL | Age: 72
End: 2017-04-10

## 2017-04-12 ENCOUNTER — APPOINTMENT (OUTPATIENT)
Dept: CARDIAC REHAB | Facility: HOSPITAL | Age: 72
End: 2017-04-12

## 2017-04-14 ENCOUNTER — APPOINTMENT (OUTPATIENT)
Dept: CARDIAC REHAB | Facility: HOSPITAL | Age: 72
End: 2017-04-14

## 2017-04-17 ENCOUNTER — APPOINTMENT (OUTPATIENT)
Dept: CARDIAC REHAB | Facility: HOSPITAL | Age: 72
End: 2017-04-17

## 2017-04-19 ENCOUNTER — APPOINTMENT (OUTPATIENT)
Dept: CARDIAC REHAB | Facility: HOSPITAL | Age: 72
End: 2017-04-19

## 2017-04-21 ENCOUNTER — APPOINTMENT (OUTPATIENT)
Dept: CARDIAC REHAB | Facility: HOSPITAL | Age: 72
End: 2017-04-21

## 2017-04-24 ENCOUNTER — APPOINTMENT (OUTPATIENT)
Dept: CARDIAC REHAB | Facility: HOSPITAL | Age: 72
End: 2017-04-24

## 2017-04-26 ENCOUNTER — APPOINTMENT (OUTPATIENT)
Dept: CARDIAC REHAB | Facility: HOSPITAL | Age: 72
End: 2017-04-26

## 2017-04-28 ENCOUNTER — APPOINTMENT (OUTPATIENT)
Dept: CARDIAC REHAB | Facility: HOSPITAL | Age: 72
End: 2017-04-28

## 2017-05-17 ENCOUNTER — TELEPHONE (OUTPATIENT)
Dept: CARDIAC REHAB | Facility: HOSPITAL | Age: 72
End: 2017-05-17

## 2017-05-19 ENCOUNTER — APPOINTMENT (OUTPATIENT)
Dept: CARDIAC REHAB | Facility: HOSPITAL | Age: 72
End: 2017-05-19

## 2017-05-22 ENCOUNTER — APPOINTMENT (OUTPATIENT)
Dept: CARDIAC REHAB | Facility: HOSPITAL | Age: 72
End: 2017-05-22

## 2017-05-24 ENCOUNTER — APPOINTMENT (OUTPATIENT)
Dept: CARDIAC REHAB | Facility: HOSPITAL | Age: 72
End: 2017-05-24

## 2017-05-26 ENCOUNTER — APPOINTMENT (OUTPATIENT)
Dept: CARDIAC REHAB | Facility: HOSPITAL | Age: 72
End: 2017-05-26

## 2017-05-31 ENCOUNTER — APPOINTMENT (OUTPATIENT)
Dept: CARDIAC REHAB | Facility: HOSPITAL | Age: 72
End: 2017-05-31

## 2017-08-24 ENCOUNTER — OFFICE VISIT (OUTPATIENT)
Dept: CARDIOLOGY | Facility: CLINIC | Age: 72
End: 2017-08-24

## 2017-08-24 VITALS
HEART RATE: 83 BPM | OXYGEN SATURATION: 99 % | SYSTOLIC BLOOD PRESSURE: 132 MMHG | DIASTOLIC BLOOD PRESSURE: 78 MMHG | HEIGHT: 71 IN | WEIGHT: 137 LBS | BODY MASS INDEX: 19.18 KG/M2

## 2017-08-24 DIAGNOSIS — I20.8 ANGINA AT REST (HCC): ICD-10-CM

## 2017-08-24 DIAGNOSIS — Z95.5 STENTED CORONARY ARTERY: ICD-10-CM

## 2017-08-24 DIAGNOSIS — F17.200 SMOKER: ICD-10-CM

## 2017-08-24 DIAGNOSIS — N18.9 CKD (CHRONIC KIDNEY DISEASE), UNSPECIFIED STAGE: ICD-10-CM

## 2017-08-24 DIAGNOSIS — I25.10 CORONARY ARTERY DISEASE INVOLVING NATIVE CORONARY ARTERY OF NATIVE HEART WITHOUT ANGINA PECTORIS: ICD-10-CM

## 2017-08-24 DIAGNOSIS — I10 ESSENTIAL HYPERTENSION: Primary | ICD-10-CM

## 2017-08-24 PROBLEM — I20.9 ISCHEMIC CHEST PAIN (HCC): Status: RESOLVED | Noted: 2017-01-11 | Resolved: 2017-08-24

## 2017-08-24 PROCEDURE — 99212 OFFICE O/P EST SF 10 MIN: CPT | Performed by: INTERNAL MEDICINE

## 2017-08-24 PROCEDURE — 93000 ELECTROCARDIOGRAM COMPLETE: CPT | Performed by: INTERNAL MEDICINE

## 2017-11-20 ENCOUNTER — TRANSCRIBE ORDERS (OUTPATIENT)
Dept: ADMINISTRATIVE | Facility: HOSPITAL | Age: 72
End: 2017-11-20

## 2019-04-08 ENCOUNTER — HOSPITAL ENCOUNTER (INPATIENT)
Facility: HOSPITAL | Age: 74
LOS: 4 days | Discharge: HOME | End: 2019-04-12
Attending: EMERGENCY MEDICINE | Admitting: INTERNAL MEDICINE

## 2019-04-08 ENCOUNTER — APPOINTMENT (OUTPATIENT)
Dept: CARDIOLOGY | Facility: HOSPITAL | Age: 74
End: 2019-04-08

## 2019-04-08 ENCOUNTER — APPOINTMENT (OUTPATIENT)
Dept: GENERAL RADIOLOGY | Facility: HOSPITAL | Age: 74
End: 2019-04-08

## 2019-04-08 DIAGNOSIS — R94.39 ABNORMAL STRESS ECHO: ICD-10-CM

## 2019-04-08 DIAGNOSIS — I20.0 UNSTABLE ANGINA PECTORIS (HCC): ICD-10-CM

## 2019-04-08 DIAGNOSIS — R07.9 CHEST PAIN, UNSPECIFIED TYPE: Primary | ICD-10-CM

## 2019-04-08 LAB
ALBUMIN SERPL-MCNC: 4.1 G/DL (ref 3.5–5)
ALBUMIN/GLOB SERPL: 1.4 G/DL (ref 1.1–2.5)
ALP SERPL-CCNC: 61 U/L (ref 24–120)
ALT SERPL W P-5'-P-CCNC: <15 U/L (ref 0–54)
ANION GAP SERPL CALCULATED.3IONS-SCNC: 11 MMOL/L (ref 4–13)
AST SERPL-CCNC: 42 U/L (ref 7–45)
BASOPHILS # BLD AUTO: 0.01 10*3/MM3 (ref 0–0.2)
BASOPHILS NFR BLD AUTO: 0.2 % (ref 0–2)
BH CV STRESS BP STAGE 1: NORMAL
BH CV STRESS BP STAGE 2: NORMAL
BH CV STRESS BP STAGE 3: NORMAL
BH CV STRESS BP STAGE 4: NORMAL
BH CV STRESS DOB - ATROPINE STAGE 4: 0.3
BH CV STRESS DOSE DOBUTAMINE STAGE 1: 10
BH CV STRESS DOSE DOBUTAMINE STAGE 2: 20
BH CV STRESS DOSE DOBUTAMINE STAGE 3: 30
BH CV STRESS DOSE DOBUTAMINE STAGE 4: 40
BH CV STRESS DURATION MIN STAGE 1: 3
BH CV STRESS DURATION MIN STAGE 2: 3
BH CV STRESS DURATION MIN STAGE 3: 3
BH CV STRESS DURATION MIN STAGE 4: 1
BH CV STRESS DURATION SEC STAGE 1: 0
BH CV STRESS DURATION SEC STAGE 2: 0
BH CV STRESS DURATION SEC STAGE 3: 0
BH CV STRESS DURATION SEC STAGE 4: 15
BH CV STRESS HR STAGE 1: 69
BH CV STRESS HR STAGE 2: 96
BH CV STRESS HR STAGE 3: 92
BH CV STRESS HR STAGE 4: 153
BH CV STRESS PROTOCOL 1: NORMAL
BH CV STRESS RECOVERY BP: NORMAL MMHG
BH CV STRESS RECOVERY HR: 86 BPM
BH CV STRESS STAGE 1: 1
BH CV STRESS STAGE 2: 2
BH CV STRESS STAGE 3: 3
BH CV STRESS STAGE 4: 4
BILIRUB SERPL-MCNC: 0.5 MG/DL (ref 0.1–1)
BUN BLD-MCNC: 29 MG/DL (ref 5–21)
BUN/CREAT SERPL: 10.9 (ref 7–25)
CALCIUM SPEC-SCNC: 9.5 MG/DL (ref 8.4–10.4)
CHLORIDE SERPL-SCNC: 108 MMOL/L (ref 98–110)
CO2 SERPL-SCNC: 22 MMOL/L (ref 24–31)
CREAT BLD-MCNC: 2.65 MG/DL (ref 0.5–1.4)
DEPRECATED RDW RBC AUTO: 44.8 FL (ref 40–54)
EOSINOPHIL # BLD AUTO: 0.34 10*3/MM3 (ref 0–0.7)
EOSINOPHIL NFR BLD AUTO: 5.8 % (ref 0–4)
ERYTHROCYTE [DISTWIDTH] IN BLOOD BY AUTOMATED COUNT: 13.1 % (ref 12–15)
GFR SERPL CREATININE-BSD FRML MDRD: 29 ML/MIN/1.73
GLOBULIN UR ELPH-MCNC: 2.9 GM/DL
GLUCOSE BLD-MCNC: 155 MG/DL (ref 70–100)
HCT VFR BLD AUTO: 32.3 % (ref 40–52)
HGB BLD-MCNC: 11.3 G/DL (ref 14–18)
HOLD SPECIMEN: NORMAL
HOLD SPECIMEN: NORMAL
IMM GRANULOCYTES # BLD AUTO: 0.03 10*3/MM3 (ref 0–0.05)
IMM GRANULOCYTES NFR BLD AUTO: 0.5 % (ref 0–5)
LYMPHOCYTES # BLD AUTO: 1.37 10*3/MM3 (ref 0.72–4.86)
LYMPHOCYTES NFR BLD AUTO: 23.2 % (ref 15–45)
MAXIMAL PREDICTED HEART RATE: 147 BPM
MCH RBC QN AUTO: 32.6 PG (ref 28–32)
MCHC RBC AUTO-ENTMCNC: 35 G/DL (ref 33–36)
MCV RBC AUTO: 93.1 FL (ref 82–95)
MONOCYTES # BLD AUTO: 0.4 10*3/MM3 (ref 0.19–1.3)
MONOCYTES NFR BLD AUTO: 6.8 % (ref 4–12)
NEUTROPHILS # BLD AUTO: 3.76 10*3/MM3 (ref 1.87–8.4)
NEUTROPHILS NFR BLD AUTO: 63.5 % (ref 39–78)
NRBC BLD AUTO-RTO: 0 /100 WBC (ref 0–0)
NT-PROBNP SERPL-MCNC: 798 PG/ML (ref 0–900)
PERCENT MAX PREDICTED HR: 104.08 %
PLATELET # BLD AUTO: 188 10*3/MM3 (ref 130–400)
PMV BLD AUTO: 9.2 FL (ref 6–12)
POTASSIUM BLD-SCNC: 4.3 MMOL/L (ref 3.5–5.3)
PROT SERPL-MCNC: 7 G/DL (ref 6.3–8.7)
RBC # BLD AUTO: 3.47 10*6/MM3 (ref 4.8–5.9)
SODIUM BLD-SCNC: 141 MMOL/L (ref 135–145)
STRESS BASELINE BP: NORMAL MMHG
STRESS BASELINE HR: 66 BPM
STRESS PERCENT HR: 122 %
STRESS POST EXERCISE DUR MIN: 9 MIN
STRESS POST EXERCISE DUR SEC: 15 SEC
STRESS POST PEAK BP: NORMAL MMHG
STRESS POST PEAK HR: 153 BPM
STRESS TARGET HR: 125 BPM
TROPONIN I SERPL-MCNC: <0.012 NG/ML (ref 0–0.03)
TROPONIN I SERPL-MCNC: <0.012 NG/ML (ref 0–0.03)
WBC NRBC COR # BLD: 5.91 10*3/MM3 (ref 4.8–10.8)
WHOLE BLOOD HOLD SPECIMEN: NORMAL
WHOLE BLOOD HOLD SPECIMEN: NORMAL

## 2019-04-08 PROCEDURE — 84484 ASSAY OF TROPONIN QUANT: CPT | Performed by: EMERGENCY MEDICINE

## 2019-04-08 PROCEDURE — 93017 CV STRESS TEST TRACING ONLY: CPT

## 2019-04-08 PROCEDURE — 93005 ELECTROCARDIOGRAM TRACING: CPT | Performed by: EMERGENCY MEDICINE

## 2019-04-08 PROCEDURE — 83880 ASSAY OF NATRIURETIC PEPTIDE: CPT | Performed by: EMERGENCY MEDICINE

## 2019-04-08 PROCEDURE — 25010000003 DOBUTAMINE PER 250 MG: Performed by: INTERNAL MEDICINE

## 2019-04-08 PROCEDURE — 71045 X-RAY EXAM CHEST 1 VIEW: CPT

## 2019-04-08 PROCEDURE — 80053 COMPREHEN METABOLIC PANEL: CPT | Performed by: EMERGENCY MEDICINE

## 2019-04-08 PROCEDURE — 36415 COLL VENOUS BLD VENIPUNCTURE: CPT

## 2019-04-08 PROCEDURE — 93352 ADMIN ECG CONTRAST AGENT: CPT | Performed by: INTERNAL MEDICINE

## 2019-04-08 PROCEDURE — 25010000002 PERFLUTREN 6.52 MG/ML SUSPENSION: Performed by: INTERNAL MEDICINE

## 2019-04-08 PROCEDURE — 99285 EMERGENCY DEPT VISIT HI MDM: CPT

## 2019-04-08 PROCEDURE — 93350 STRESS TTE ONLY: CPT

## 2019-04-08 PROCEDURE — 25010000002 ENOXAPARIN PER 10 MG: Performed by: NURSE PRACTITIONER

## 2019-04-08 PROCEDURE — 93350 STRESS TTE ONLY: CPT | Performed by: INTERNAL MEDICINE

## 2019-04-08 PROCEDURE — 93010 ELECTROCARDIOGRAM REPORT: CPT | Performed by: INTERNAL MEDICINE

## 2019-04-08 PROCEDURE — 93005 ELECTROCARDIOGRAM TRACING: CPT

## 2019-04-08 PROCEDURE — 85025 COMPLETE CBC W/AUTO DIFF WBC: CPT | Performed by: EMERGENCY MEDICINE

## 2019-04-08 PROCEDURE — 99223 1ST HOSP IP/OBS HIGH 75: CPT | Performed by: INTERNAL MEDICINE

## 2019-04-08 PROCEDURE — 93018 CV STRESS TEST I&R ONLY: CPT | Performed by: INTERNAL MEDICINE

## 2019-04-08 RX ORDER — HYDROCHLOROTHIAZIDE 25 MG/1
25 TABLET ORAL DAILY
Status: DISCONTINUED | OUTPATIENT
Start: 2019-04-09 | End: 2019-04-12 | Stop reason: HOSPADM

## 2019-04-08 RX ORDER — LISINOPRIL 20 MG/1
40 TABLET ORAL DAILY
Status: DISCONTINUED | OUTPATIENT
Start: 2019-04-08 | End: 2019-04-12 | Stop reason: HOSPADM

## 2019-04-08 RX ORDER — METOPROLOL TARTRATE 5 MG/5ML
5 INJECTION INTRAVENOUS ONCE
Status: COMPLETED | OUTPATIENT
Start: 2019-04-08 | End: 2019-04-08

## 2019-04-08 RX ORDER — CARVEDILOL 25 MG/1
25 TABLET ORAL EVERY 12 HOURS SCHEDULED
Status: DISCONTINUED | OUTPATIENT
Start: 2019-04-08 | End: 2019-04-12 | Stop reason: HOSPADM

## 2019-04-08 RX ORDER — FAMOTIDINE 20 MG/1
20 TABLET, FILM COATED ORAL DAILY
Status: DISCONTINUED | OUTPATIENT
Start: 2019-04-09 | End: 2019-04-12 | Stop reason: HOSPADM

## 2019-04-08 RX ORDER — RANITIDINE 150 MG/1
150 TABLET ORAL 2 TIMES DAILY
COMMUNITY

## 2019-04-08 RX ORDER — NITROGLYCERIN 0.4 MG/1
0.4 TABLET SUBLINGUAL
Status: ON HOLD | COMMUNITY
End: 2019-04-12 | Stop reason: SDUPTHER

## 2019-04-08 RX ORDER — NITROGLYCERIN 0.4 MG/1
0.4 TABLET SUBLINGUAL
Status: DISCONTINUED | OUTPATIENT
Start: 2019-04-08 | End: 2019-04-12 | Stop reason: HOSPADM

## 2019-04-08 RX ORDER — PRASUGREL 10 MG/1
10 TABLET, FILM COATED ORAL DAILY
Status: DISCONTINUED | OUTPATIENT
Start: 2019-04-09 | End: 2019-04-12 | Stop reason: HOSPADM

## 2019-04-08 RX ORDER — SODIUM CHLORIDE 0.9 % (FLUSH) 0.9 %
3 SYRINGE (ML) INJECTION EVERY 12 HOURS SCHEDULED
Status: DISCONTINUED | OUTPATIENT
Start: 2019-04-08 | End: 2019-04-12 | Stop reason: HOSPADM

## 2019-04-08 RX ORDER — HYDRALAZINE HYDROCHLORIDE 50 MG/1
100 TABLET, FILM COATED ORAL EVERY 12 HOURS SCHEDULED
Status: DISCONTINUED | OUTPATIENT
Start: 2019-04-08 | End: 2019-04-12 | Stop reason: HOSPADM

## 2019-04-08 RX ORDER — ENALAPRILAT 2.5 MG/2ML
0.62 INJECTION INTRAVENOUS EVERY 6 HOURS PRN
Status: DISCONTINUED | OUTPATIENT
Start: 2019-04-08 | End: 2019-04-12 | Stop reason: HOSPADM

## 2019-04-08 RX ORDER — ASPIRIN 81 MG/1
81 TABLET ORAL DAILY
Status: DISCONTINUED | OUTPATIENT
Start: 2019-04-09 | End: 2019-04-12 | Stop reason: HOSPADM

## 2019-04-08 RX ORDER — ACETAMINOPHEN 325 MG/1
650 TABLET ORAL EVERY 4 HOURS PRN
Status: DISCONTINUED | OUTPATIENT
Start: 2019-04-08 | End: 2019-04-12 | Stop reason: HOSPADM

## 2019-04-08 RX ORDER — ONDANSETRON 2 MG/ML
4 INJECTION INTRAMUSCULAR; INTRAVENOUS EVERY 6 HOURS PRN
Status: DISCONTINUED | OUTPATIENT
Start: 2019-04-08 | End: 2019-04-12 | Stop reason: HOSPADM

## 2019-04-08 RX ORDER — SODIUM CHLORIDE 9 MG/ML
125 INJECTION, SOLUTION INTRAVENOUS CONTINUOUS
Status: DISCONTINUED | OUTPATIENT
Start: 2019-04-08 | End: 2019-04-10

## 2019-04-08 RX ORDER — SODIUM CHLORIDE 0.9 % (FLUSH) 0.9 %
10 SYRINGE (ML) INJECTION AS NEEDED
Status: DISCONTINUED | OUTPATIENT
Start: 2019-04-08 | End: 2019-04-08

## 2019-04-08 RX ORDER — ASPIRIN 81 MG/1
324 TABLET, CHEWABLE ORAL ONCE
Status: COMPLETED | OUTPATIENT
Start: 2019-04-08 | End: 2019-04-08

## 2019-04-08 RX ORDER — ATORVASTATIN CALCIUM 40 MG/1
40 TABLET, FILM COATED ORAL NIGHTLY
Status: DISCONTINUED | OUTPATIENT
Start: 2019-04-08 | End: 2019-04-12 | Stop reason: HOSPADM

## 2019-04-08 RX ORDER — SODIUM CHLORIDE 0.9 % (FLUSH) 0.9 %
3-10 SYRINGE (ML) INJECTION AS NEEDED
Status: DISCONTINUED | OUTPATIENT
Start: 2019-04-08 | End: 2019-04-12 | Stop reason: HOSPADM

## 2019-04-08 RX ORDER — METOPROLOL SUCCINATE 25 MG/1
25 TABLET, EXTENDED RELEASE ORAL DAILY
COMMUNITY
End: 2019-04-12 | Stop reason: HOSPADM

## 2019-04-08 RX ORDER — FLUTICASONE PROPIONATE 50 MCG
2 SPRAY, SUSPENSION (ML) NASAL DAILY
Status: DISCONTINUED | OUTPATIENT
Start: 2019-04-09 | End: 2019-04-12 | Stop reason: HOSPADM

## 2019-04-08 RX ORDER — DOBUTAMINE HYDROCHLORIDE 100 MG/100ML
10-50 INJECTION INTRAVENOUS CONTINUOUS
Status: DISCONTINUED | OUTPATIENT
Start: 2019-04-08 | End: 2019-04-08

## 2019-04-08 RX ORDER — ALLOPURINOL 100 MG/1
100 TABLET ORAL DAILY
Status: DISCONTINUED | OUTPATIENT
Start: 2019-04-09 | End: 2019-04-09

## 2019-04-08 RX ADMIN — LISINOPRIL 40 MG: 20 TABLET ORAL at 19:42

## 2019-04-08 RX ADMIN — CARVEDILOL 25 MG: 25 TABLET, FILM COATED ORAL at 21:08

## 2019-04-08 RX ADMIN — DESMOPRESSIN ACETATE 40 MG: 0.2 TABLET ORAL at 21:09

## 2019-04-08 RX ADMIN — ENOXAPARIN SODIUM 30 MG: 30 INJECTION SUBCUTANEOUS at 19:42

## 2019-04-08 RX ADMIN — HYDRALAZINE HYDROCHLORIDE 100 MG: 50 TABLET ORAL at 21:09

## 2019-04-08 RX ADMIN — METOPROLOL TARTRATE 5 MG: 1 INJECTION, SOLUTION INTRAVENOUS at 16:15

## 2019-04-08 RX ADMIN — ASPIRIN 81 MG 324 MG: 81 TABLET ORAL at 11:24

## 2019-04-08 RX ADMIN — ATROPINE SULFATE 0.3 MG: 0.1 INJECTION PARENTERAL at 16:15

## 2019-04-08 RX ADMIN — NITROGLYCERIN 1 INCH: 20 OINTMENT TOPICAL at 11:31

## 2019-04-08 RX ADMIN — SODIUM CHLORIDE 75 ML/HR: 9 INJECTION, SOLUTION INTRAVENOUS at 19:38

## 2019-04-08 RX ADMIN — PERFLUTREN 8.48 MG: 6.52 INJECTION, SUSPENSION INTRAVENOUS at 15:38

## 2019-04-08 RX ADMIN — Medication 10 MCG/KG/MIN: at 15:39

## 2019-04-09 PROBLEM — I20.0 UNSTABLE ANGINA PECTORIS (HCC): Status: ACTIVE | Noted: 2019-04-08

## 2019-04-09 LAB
ANION GAP SERPL CALCULATED.3IONS-SCNC: 8 MMOL/L (ref 4–13)
ARTICHOKE IGE QN: 65 MG/DL (ref 0–99)
BACTERIA UR QL AUTO: ABNORMAL /HPF
BILIRUB UR QL STRIP: NEGATIVE
BUN BLD-MCNC: 32 MG/DL (ref 5–21)
BUN/CREAT SERPL: 12.1 (ref 7–25)
CALCIUM SPEC-SCNC: 8.8 MG/DL (ref 8.4–10.4)
CHLORIDE SERPL-SCNC: 111 MMOL/L (ref 98–110)
CHOLEST SERPL-MCNC: 116 MG/DL (ref 130–200)
CLARITY UR: CLEAR
CO2 SERPL-SCNC: 22 MMOL/L (ref 24–31)
COLOR UR: YELLOW
CREAT BLD-MCNC: 2.64 MG/DL (ref 0.5–1.4)
DEPRECATED RDW RBC AUTO: 45.4 FL (ref 40–54)
ERYTHROCYTE [DISTWIDTH] IN BLOOD BY AUTOMATED COUNT: 13 % (ref 12–15)
GFR SERPL CREATININE-BSD FRML MDRD: 29 ML/MIN/1.73
GLUCOSE BLD-MCNC: 101 MG/DL (ref 70–100)
GLUCOSE UR STRIP-MCNC: NEGATIVE MG/DL
HBA1C MFR BLD: 5.2 %
HCT VFR BLD AUTO: 26.6 % (ref 40–52)
HDLC SERPL-MCNC: 40 MG/DL
HGB BLD-MCNC: 9.1 G/DL (ref 14–18)
HGB UR QL STRIP.AUTO: ABNORMAL
HYALINE CASTS UR QL AUTO: ABNORMAL /LPF
KETONES UR QL STRIP: NEGATIVE
LDLC/HDLC SERPL: 1.58 {RATIO}
LEUKOCYTE ESTERASE UR QL STRIP.AUTO: NEGATIVE
MCH RBC QN AUTO: 32.7 PG (ref 28–32)
MCHC RBC AUTO-ENTMCNC: 34.2 G/DL (ref 33–36)
MCV RBC AUTO: 95.7 FL (ref 82–95)
NITRITE UR QL STRIP: NEGATIVE
PH UR STRIP.AUTO: <=5 [PH] (ref 5–8)
PLATELET # BLD AUTO: 157 10*3/MM3 (ref 130–400)
PMV BLD AUTO: 9.4 FL (ref 6–12)
POTASSIUM BLD-SCNC: 4.7 MMOL/L (ref 3.5–5.3)
PROT UR QL STRIP: ABNORMAL
PTH-INTACT SERPL-MCNC: 93.4 PG/ML (ref 7.5–53.5)
RBC # BLD AUTO: 2.78 10*6/MM3 (ref 4.8–5.9)
RBC # UR: ABNORMAL /HPF
REF LAB TEST METHOD: ABNORMAL
SODIUM BLD-SCNC: 141 MMOL/L (ref 135–145)
SP GR UR STRIP: 1.03 (ref 1–1.03)
SQUAMOUS #/AREA URNS HPF: ABNORMAL /HPF
TRIGL SERPL-MCNC: 64 MG/DL (ref 0–149)
TROPONIN I SERPL-MCNC: 0.09 NG/ML (ref 0–0.03)
UROBILINOGEN UR QL STRIP: ABNORMAL
WBC NRBC COR # BLD: 4.76 10*3/MM3 (ref 4.8–10.8)
WBC UR QL AUTO: ABNORMAL /HPF

## 2019-04-09 PROCEDURE — 25010000002 ADENOSINE (DIAGNOSTIC) PER 30 MG: Performed by: INTERNAL MEDICINE

## 2019-04-09 PROCEDURE — C1760 CLOSURE DEV, VASC: HCPCS | Performed by: INTERNAL MEDICINE

## 2019-04-09 PROCEDURE — 80048 BASIC METABOLIC PNL TOTAL CA: CPT | Performed by: INTERNAL MEDICINE

## 2019-04-09 PROCEDURE — 93010 ELECTROCARDIOGRAM REPORT: CPT | Performed by: INTERNAL MEDICINE

## 2019-04-09 PROCEDURE — 99153 MOD SED SAME PHYS/QHP EA: CPT | Performed by: INTERNAL MEDICINE

## 2019-04-09 PROCEDURE — 25010000002 HEPARIN (PORCINE) PER 1000 UNITS: Performed by: INTERNAL MEDICINE

## 2019-04-09 PROCEDURE — C1894 INTRO/SHEATH, NON-LASER: HCPCS | Performed by: INTERNAL MEDICINE

## 2019-04-09 PROCEDURE — 80061 LIPID PANEL: CPT | Performed by: INTERNAL MEDICINE

## 2019-04-09 PROCEDURE — C1769 GUIDE WIRE: HCPCS | Performed by: INTERNAL MEDICINE

## 2019-04-09 PROCEDURE — B2151ZZ FLUOROSCOPY OF LEFT HEART USING LOW OSMOLAR CONTRAST: ICD-10-PCS | Performed by: INTERNAL MEDICINE

## 2019-04-09 PROCEDURE — 25010000002 IOPAMIDOL 61 % SOLUTION: Performed by: INTERNAL MEDICINE

## 2019-04-09 PROCEDURE — C9600 PERC DRUG-EL COR STENT SING: HCPCS | Performed by: INTERNAL MEDICINE

## 2019-04-09 PROCEDURE — 25010000002 ENOXAPARIN PER 10 MG: Performed by: NURSE PRACTITIONER

## 2019-04-09 PROCEDURE — 93458 L HRT ARTERY/VENTRICLE ANGIO: CPT | Performed by: INTERNAL MEDICINE

## 2019-04-09 PROCEDURE — 81001 URINALYSIS AUTO W/SCOPE: CPT | Performed by: INTERNAL MEDICINE

## 2019-04-09 PROCEDURE — 83970 ASSAY OF PARATHORMONE: CPT | Performed by: INTERNAL MEDICINE

## 2019-04-09 PROCEDURE — 25010000002 BIVALIRUDIN 5 MG/ML: Performed by: INTERNAL MEDICINE

## 2019-04-09 PROCEDURE — 94760 N-INVAS EAR/PLS OXIMETRY 1: CPT

## 2019-04-09 PROCEDURE — C1887 CATHETER, GUIDING: HCPCS | Performed by: INTERNAL MEDICINE

## 2019-04-09 PROCEDURE — 99152 MOD SED SAME PHYS/QHP 5/>YRS: CPT | Performed by: INTERNAL MEDICINE

## 2019-04-09 PROCEDURE — 92928 PRQ TCAT PLMT NTRAC ST 1 LES: CPT | Performed by: INTERNAL MEDICINE

## 2019-04-09 PROCEDURE — 93571 IV DOP VEL&/PRESS C FLO 1ST: CPT | Performed by: INTERNAL MEDICINE

## 2019-04-09 PROCEDURE — L1830 KO IMMOB CANVAS LONG PRE OTS: HCPCS | Performed by: INTERNAL MEDICINE

## 2019-04-09 PROCEDURE — 4A023N7 MEASUREMENT OF CARDIAC SAMPLING AND PRESSURE, LEFT HEART, PERCUTANEOUS APPROACH: ICD-10-PCS | Performed by: INTERNAL MEDICINE

## 2019-04-09 PROCEDURE — C1874 STENT, COATED/COV W/DEL SYS: HCPCS | Performed by: INTERNAL MEDICINE

## 2019-04-09 PROCEDURE — 4A033BC MEASUREMENT OF ARTERIAL PRESSURE, CORONARY, PERCUTANEOUS APPROACH: ICD-10-PCS | Performed by: INTERNAL MEDICINE

## 2019-04-09 PROCEDURE — 85027 COMPLETE CBC AUTOMATED: CPT | Performed by: INTERNAL MEDICINE

## 2019-04-09 PROCEDURE — 25010000002 DIPHENHYDRAMINE PER 50 MG: Performed by: INTERNAL MEDICINE

## 2019-04-09 PROCEDURE — 94799 UNLISTED PULMONARY SVC/PX: CPT

## 2019-04-09 PROCEDURE — 25010000002 MIDAZOLAM PER 1 MG: Performed by: INTERNAL MEDICINE

## 2019-04-09 PROCEDURE — 25010000002 FENTANYL CITRATE (PF) 100 MCG/2ML SOLUTION: Performed by: INTERNAL MEDICINE

## 2019-04-09 PROCEDURE — 93005 ELECTROCARDIOGRAM TRACING: CPT | Performed by: INTERNAL MEDICINE

## 2019-04-09 PROCEDURE — B2111ZZ FLUOROSCOPY OF MULTIPLE CORONARY ARTERIES USING LOW OSMOLAR CONTRAST: ICD-10-PCS | Performed by: INTERNAL MEDICINE

## 2019-04-09 PROCEDURE — 83036 HEMOGLOBIN GLYCOSYLATED A1C: CPT | Performed by: INTERNAL MEDICINE

## 2019-04-09 PROCEDURE — 027034Z DILATION OF CORONARY ARTERY, ONE ARTERY WITH DRUG-ELUTING INTRALUMINAL DEVICE, PERCUTANEOUS APPROACH: ICD-10-PCS | Performed by: INTERNAL MEDICINE

## 2019-04-09 PROCEDURE — 84484 ASSAY OF TROPONIN QUANT: CPT | Performed by: INTERNAL MEDICINE

## 2019-04-09 DEVICE — EVEROLIMUS-ELUTING PLATINUM CHROMIUM CORONARY STENT SYSTEM
Type: IMPLANTABLE DEVICE | Status: FUNCTIONAL
Brand: SYNERGY™

## 2019-04-09 RX ORDER — LIDOCAINE HYDROCHLORIDE 20 MG/ML
INJECTION, SOLUTION INFILTRATION; PERINEURAL AS NEEDED
Status: DISCONTINUED | OUTPATIENT
Start: 2019-04-09 | End: 2019-04-09 | Stop reason: HOSPADM

## 2019-04-09 RX ORDER — LABETALOL HYDROCHLORIDE 5 MG/ML
INJECTION, SOLUTION INTRAVENOUS AS NEEDED
Status: DISCONTINUED | OUTPATIENT
Start: 2019-04-09 | End: 2019-04-09 | Stop reason: HOSPADM

## 2019-04-09 RX ORDER — FOLIC ACID/VIT B COMPLEX AND C 0.8 MG
1 TABLET ORAL DAILY
Status: DISCONTINUED | OUTPATIENT
Start: 2019-04-09 | End: 2019-04-12 | Stop reason: HOSPADM

## 2019-04-09 RX ORDER — MIDAZOLAM HYDROCHLORIDE 1 MG/ML
INJECTION INTRAMUSCULAR; INTRAVENOUS AS NEEDED
Status: DISCONTINUED | OUTPATIENT
Start: 2019-04-09 | End: 2019-04-09 | Stop reason: HOSPADM

## 2019-04-09 RX ORDER — ALLOPURINOL 100 MG/1
100 TABLET ORAL NIGHTLY
Status: DISCONTINUED | OUTPATIENT
Start: 2019-04-09 | End: 2019-04-12 | Stop reason: HOSPADM

## 2019-04-09 RX ORDER — SODIUM CHLORIDE 9 MG/ML
50 INJECTION, SOLUTION INTRAVENOUS CONTINUOUS
Status: DISCONTINUED | OUTPATIENT
Start: 2019-04-09 | End: 2019-04-11

## 2019-04-09 RX ORDER — METOPROLOL SUCCINATE 25 MG/1
25 TABLET, EXTENDED RELEASE ORAL DAILY
Status: DISCONTINUED | OUTPATIENT
Start: 2019-04-09 | End: 2019-04-09

## 2019-04-09 RX ORDER — FENTANYL CITRATE 50 UG/ML
INJECTION, SOLUTION INTRAMUSCULAR; INTRAVENOUS AS NEEDED
Status: DISCONTINUED | OUTPATIENT
Start: 2019-04-09 | End: 2019-04-09 | Stop reason: HOSPADM

## 2019-04-09 RX ORDER — CARVEDILOL 25 MG/1
25 TABLET ORAL 2 TIMES DAILY WITH MEALS
Status: DISCONTINUED | OUTPATIENT
Start: 2019-04-09 | End: 2019-04-09

## 2019-04-09 RX ORDER — DIPHENHYDRAMINE HYDROCHLORIDE 50 MG/ML
INJECTION INTRAMUSCULAR; INTRAVENOUS AS NEEDED
Status: DISCONTINUED | OUTPATIENT
Start: 2019-04-09 | End: 2019-04-09 | Stop reason: HOSPADM

## 2019-04-09 RX ADMIN — Medication 1 TABLET: at 17:41

## 2019-04-09 RX ADMIN — FAMOTIDINE 20 MG: 20 TABLET, FILM COATED ORAL at 10:17

## 2019-04-09 RX ADMIN — ASPIRIN 81 MG: 81 TABLET, DELAYED RELEASE ORAL at 10:18

## 2019-04-09 RX ADMIN — DESMOPRESSIN ACETATE 40 MG: 0.2 TABLET ORAL at 20:01

## 2019-04-09 RX ADMIN — CARVEDILOL 25 MG: 25 TABLET, FILM COATED ORAL at 10:17

## 2019-04-09 RX ADMIN — ENOXAPARIN SODIUM 30 MG: 30 INJECTION SUBCUTANEOUS at 20:01

## 2019-04-09 RX ADMIN — HYDROCHLOROTHIAZIDE 25 MG: 25 TABLET ORAL at 10:19

## 2019-04-09 RX ADMIN — PRASUGREL HYDROCHLORIDE 10 MG: 10 TABLET, FILM COATED ORAL at 10:18

## 2019-04-09 RX ADMIN — HYDRALAZINE HYDROCHLORIDE 100 MG: 50 TABLET ORAL at 10:18

## 2019-04-09 RX ADMIN — SODIUM CHLORIDE 100 ML/HR: 9 INJECTION, SOLUTION INTRAVENOUS at 14:15

## 2019-04-09 RX ADMIN — SODIUM CHLORIDE 5 MG/HR: 9 INJECTION, SOLUTION INTRAVENOUS at 17:56

## 2019-04-09 RX ADMIN — SODIUM CHLORIDE 75 ML/HR: 9 INJECTION, SOLUTION INTRAVENOUS at 10:16

## 2019-04-09 RX ADMIN — ALLOPURINOL 100 MG: 100 TABLET ORAL at 20:01

## 2019-04-09 RX ADMIN — LISINOPRIL 40 MG: 20 TABLET ORAL at 10:18

## 2019-04-09 RX ADMIN — SODIUM CHLORIDE 5 MG/HR: 9 INJECTION, SOLUTION INTRAVENOUS at 17:57

## 2019-04-09 RX ADMIN — SODIUM CHLORIDE, PRESERVATIVE FREE 3 ML: 5 INJECTION INTRAVENOUS at 20:57

## 2019-04-09 RX ADMIN — HYDRALAZINE HYDROCHLORIDE 100 MG: 50 TABLET ORAL at 20:01

## 2019-04-09 RX ADMIN — CARVEDILOL 25 MG: 25 TABLET, FILM COATED ORAL at 20:01

## 2019-04-10 LAB
ANION GAP SERPL CALCULATED.3IONS-SCNC: 8 MMOL/L (ref 4–13)
ARTICHOKE IGE QN: 64 MG/DL (ref 0–99)
BUN BLD-MCNC: 26 MG/DL (ref 5–21)
BUN/CREAT SERPL: 11 (ref 7–25)
CALCIUM SPEC-SCNC: 8.5 MG/DL (ref 8.4–10.4)
CHLORIDE SERPL-SCNC: 113 MMOL/L (ref 98–110)
CHOLEST SERPL-MCNC: 110 MG/DL (ref 130–200)
CO2 SERPL-SCNC: 19 MMOL/L (ref 24–31)
CREAT BLD-MCNC: 2.37 MG/DL (ref 0.5–1.4)
DEPRECATED RDW RBC AUTO: 44.8 FL (ref 40–54)
ERYTHROCYTE [DISTWIDTH] IN BLOOD BY AUTOMATED COUNT: 13 % (ref 12–15)
GFR SERPL CREATININE-BSD FRML MDRD: 33 ML/MIN/1.73
GLUCOSE BLD-MCNC: 81 MG/DL (ref 70–100)
HCT VFR BLD AUTO: 25 % (ref 40–52)
HDLC SERPL-MCNC: 37 MG/DL
HGB BLD-MCNC: 8.7 G/DL (ref 14–18)
IRON 24H UR-MRATE: 44 MCG/DL (ref 42–180)
IRON SATN MFR SERPL: 25 % (ref 20–45)
LDLC/HDLC SERPL: 1.72 {RATIO}
MCH RBC QN AUTO: 33.1 PG (ref 28–32)
MCHC RBC AUTO-ENTMCNC: 34.8 G/DL (ref 33–36)
MCV RBC AUTO: 95.1 FL (ref 82–95)
PLATELET # BLD AUTO: 148 10*3/MM3 (ref 130–400)
PMV BLD AUTO: 9.5 FL (ref 6–12)
POTASSIUM BLD-SCNC: 4.4 MMOL/L (ref 3.5–5.3)
RBC # BLD AUTO: 2.63 10*6/MM3 (ref 4.8–5.9)
SODIUM BLD-SCNC: 140 MMOL/L (ref 135–145)
TIBC SERPL-MCNC: 178 MCG/DL (ref 225–420)
TRIGL SERPL-MCNC: 47 MG/DL (ref 0–149)
URATE SERPL-MCNC: 5.6 MG/DL (ref 3.5–8.5)
WBC NRBC COR # BLD: 5.17 10*3/MM3 (ref 4.8–10.8)

## 2019-04-10 PROCEDURE — 84550 ASSAY OF BLOOD/URIC ACID: CPT | Performed by: INTERNAL MEDICINE

## 2019-04-10 PROCEDURE — 83540 ASSAY OF IRON: CPT | Performed by: INTERNAL MEDICINE

## 2019-04-10 PROCEDURE — 85027 COMPLETE CBC AUTOMATED: CPT | Performed by: INTERNAL MEDICINE

## 2019-04-10 PROCEDURE — 80048 BASIC METABOLIC PNL TOTAL CA: CPT | Performed by: INTERNAL MEDICINE

## 2019-04-10 PROCEDURE — 99233 SBSQ HOSP IP/OBS HIGH 50: CPT | Performed by: INTERNAL MEDICINE

## 2019-04-10 PROCEDURE — 25010000002 ENOXAPARIN PER 10 MG: Performed by: NURSE PRACTITIONER

## 2019-04-10 PROCEDURE — 80061 LIPID PANEL: CPT | Performed by: INTERNAL MEDICINE

## 2019-04-10 PROCEDURE — 83550 IRON BINDING TEST: CPT | Performed by: INTERNAL MEDICINE

## 2019-04-10 PROCEDURE — 93010 ELECTROCARDIOGRAM REPORT: CPT | Performed by: INTERNAL MEDICINE

## 2019-04-10 PROCEDURE — 93005 ELECTROCARDIOGRAM TRACING: CPT | Performed by: INTERNAL MEDICINE

## 2019-04-10 RX ORDER — METHYLDOPA 250 MG/1
250 TABLET, FILM COATED ORAL EVERY 8 HOURS SCHEDULED
Status: DISCONTINUED | OUTPATIENT
Start: 2019-04-10 | End: 2019-04-10

## 2019-04-10 RX ORDER — LABETALOL HYDROCHLORIDE 5 MG/ML
10 INJECTION, SOLUTION INTRAVENOUS
Status: DISCONTINUED | OUTPATIENT
Start: 2019-04-10 | End: 2019-04-12 | Stop reason: HOSPADM

## 2019-04-10 RX ORDER — AMLODIPINE BESYLATE 5 MG/1
5 TABLET ORAL
Status: DISCONTINUED | OUTPATIENT
Start: 2019-04-10 | End: 2019-04-10

## 2019-04-10 RX ORDER — AMLODIPINE BESYLATE 10 MG/1
10 TABLET ORAL
Status: DISCONTINUED | OUTPATIENT
Start: 2019-04-11 | End: 2019-04-12 | Stop reason: HOSPADM

## 2019-04-10 RX ORDER — CALCITRIOL 0.25 UG/1
0.25 CAPSULE, LIQUID FILLED ORAL DAILY
Status: DISCONTINUED | OUTPATIENT
Start: 2019-04-10 | End: 2019-04-12 | Stop reason: HOSPADM

## 2019-04-10 RX ADMIN — HYDRALAZINE HYDROCHLORIDE 100 MG: 50 TABLET ORAL at 20:46

## 2019-04-10 RX ADMIN — AMLODIPINE BESYLATE 5 MG: 5 TABLET ORAL at 16:14

## 2019-04-10 RX ADMIN — CALCITRIOL 0.25 MCG: 0.25 CAPSULE ORAL at 19:02

## 2019-04-10 RX ADMIN — ENOXAPARIN SODIUM 30 MG: 30 INJECTION SUBCUTANEOUS at 20:45

## 2019-04-10 RX ADMIN — AMLODIPINE BESYLATE 5 MG: 10 TABLET ORAL at 17:53

## 2019-04-10 RX ADMIN — Medication 1 TABLET: at 08:35

## 2019-04-10 RX ADMIN — PRASUGREL HYDROCHLORIDE 10 MG: 10 TABLET, FILM COATED ORAL at 08:37

## 2019-04-10 RX ADMIN — SODIUM CHLORIDE 5 MG/HR: 9 INJECTION, SOLUTION INTRAVENOUS at 17:50

## 2019-04-10 RX ADMIN — HYDRALAZINE HYDROCHLORIDE 100 MG: 50 TABLET ORAL at 08:35

## 2019-04-10 RX ADMIN — HYDROCHLOROTHIAZIDE 25 MG: 25 TABLET ORAL at 08:35

## 2019-04-10 RX ADMIN — ALLOPURINOL 100 MG: 100 TABLET ORAL at 20:45

## 2019-04-10 RX ADMIN — ASPIRIN 81 MG: 81 TABLET, DELAYED RELEASE ORAL at 08:34

## 2019-04-10 RX ADMIN — CARVEDILOL 25 MG: 25 TABLET, FILM COATED ORAL at 08:35

## 2019-04-10 RX ADMIN — SODIUM CHLORIDE, PRESERVATIVE FREE 3 ML: 5 INJECTION INTRAVENOUS at 21:26

## 2019-04-10 RX ADMIN — FAMOTIDINE 20 MG: 20 TABLET, FILM COATED ORAL at 08:34

## 2019-04-10 RX ADMIN — ENALAPRILAT 0.62 MG: 1.25 INJECTION INTRAVENOUS at 12:20

## 2019-04-10 RX ADMIN — LISINOPRIL 40 MG: 20 TABLET ORAL at 08:35

## 2019-04-10 RX ADMIN — DESMOPRESSIN ACETATE 40 MG: 0.2 TABLET ORAL at 20:46

## 2019-04-10 RX ADMIN — SODIUM CHLORIDE 100 ML/HR: 9 INJECTION, SOLUTION INTRAVENOUS at 00:41

## 2019-04-10 RX ADMIN — FLUTICASONE PROPIONATE 2 SPRAY: 50 SPRAY, METERED NASAL at 08:33

## 2019-04-10 RX ADMIN — CARVEDILOL 25 MG: 25 TABLET, FILM COATED ORAL at 20:46

## 2019-04-11 LAB
ANION GAP SERPL CALCULATED.3IONS-SCNC: 7 MMOL/L (ref 4–13)
BASOPHILS # BLD AUTO: 0.01 10*3/MM3 (ref 0–0.2)
BASOPHILS NFR BLD AUTO: 0.2 % (ref 0–2)
BUN BLD-MCNC: 24 MG/DL (ref 5–21)
BUN/CREAT SERPL: 10.9 (ref 7–25)
CALCIUM SPEC-SCNC: 8.2 MG/DL (ref 8.4–10.4)
CHLORIDE SERPL-SCNC: 114 MMOL/L (ref 98–110)
CO2 SERPL-SCNC: 20 MMOL/L (ref 24–31)
CREAT BLD-MCNC: 2.21 MG/DL (ref 0.5–1.4)
DEPRECATED RDW RBC AUTO: 44 FL (ref 40–54)
EOSINOPHIL # BLD AUTO: 0.26 10*3/MM3 (ref 0–0.7)
EOSINOPHIL NFR BLD AUTO: 4.8 % (ref 0–4)
ERYTHROCYTE [DISTWIDTH] IN BLOOD BY AUTOMATED COUNT: 12.8 % (ref 12–15)
GFR SERPL CREATININE-BSD FRML MDRD: 36 ML/MIN/1.73
GLUCOSE BLD-MCNC: 75 MG/DL (ref 70–100)
HCT VFR BLD AUTO: 24.7 % (ref 40–52)
HGB BLD-MCNC: 8.5 G/DL (ref 14–18)
IMM GRANULOCYTES # BLD AUTO: 0.01 10*3/MM3 (ref 0–0.05)
IMM GRANULOCYTES NFR BLD AUTO: 0.2 % (ref 0–5)
LYMPHOCYTES # BLD AUTO: 1.33 10*3/MM3 (ref 0.72–4.86)
LYMPHOCYTES NFR BLD AUTO: 24.8 % (ref 15–45)
MCH RBC QN AUTO: 32.4 PG (ref 28–32)
MCHC RBC AUTO-ENTMCNC: 34.4 G/DL (ref 33–36)
MCV RBC AUTO: 94.3 FL (ref 82–95)
MONOCYTES # BLD AUTO: 0.59 10*3/MM3 (ref 0.19–1.3)
MONOCYTES NFR BLD AUTO: 11 % (ref 4–12)
NEUTROPHILS # BLD AUTO: 3.17 10*3/MM3 (ref 1.87–8.4)
NEUTROPHILS NFR BLD AUTO: 59 % (ref 39–78)
NRBC BLD AUTO-RTO: 0 /100 WBC (ref 0–0)
PLATELET # BLD AUTO: 150 10*3/MM3 (ref 130–400)
PMV BLD AUTO: 9.7 FL (ref 6–12)
POTASSIUM BLD-SCNC: 4.2 MMOL/L (ref 3.5–5.3)
RBC # BLD AUTO: 2.62 10*6/MM3 (ref 4.8–5.9)
SODIUM BLD-SCNC: 141 MMOL/L (ref 135–145)
WBC NRBC COR # BLD: 5.37 10*3/MM3 (ref 4.8–10.8)

## 2019-04-11 PROCEDURE — 85025 COMPLETE CBC W/AUTO DIFF WBC: CPT | Performed by: INTERNAL MEDICINE

## 2019-04-11 PROCEDURE — 94799 UNLISTED PULMONARY SVC/PX: CPT

## 2019-04-11 PROCEDURE — 93010 ELECTROCARDIOGRAM REPORT: CPT | Performed by: INTERNAL MEDICINE

## 2019-04-11 PROCEDURE — 25010000002 ENOXAPARIN PER 10 MG: Performed by: NURSE PRACTITIONER

## 2019-04-11 PROCEDURE — 80048 BASIC METABOLIC PNL TOTAL CA: CPT | Performed by: INTERNAL MEDICINE

## 2019-04-11 PROCEDURE — 93005 ELECTROCARDIOGRAM TRACING: CPT | Performed by: INTERNAL MEDICINE

## 2019-04-11 PROCEDURE — 94760 N-INVAS EAR/PLS OXIMETRY 1: CPT

## 2019-04-11 PROCEDURE — 99233 SBSQ HOSP IP/OBS HIGH 50: CPT | Performed by: INTERNAL MEDICINE

## 2019-04-11 RX ADMIN — Medication 1 TABLET: at 08:01

## 2019-04-11 RX ADMIN — SODIUM CHLORIDE 50 ML/HR: 9 INJECTION, SOLUTION INTRAVENOUS at 06:06

## 2019-04-11 RX ADMIN — CALCITRIOL 0.25 MCG: 0.25 CAPSULE ORAL at 08:01

## 2019-04-11 RX ADMIN — SODIUM CHLORIDE, PRESERVATIVE FREE 3 ML: 5 INJECTION INTRAVENOUS at 08:01

## 2019-04-11 RX ADMIN — ENALAPRILAT 0.62 MG: 1.25 INJECTION INTRAVENOUS at 06:06

## 2019-04-11 RX ADMIN — HYDRALAZINE HYDROCHLORIDE 100 MG: 50 TABLET ORAL at 08:01

## 2019-04-11 RX ADMIN — SODIUM CHLORIDE, PRESERVATIVE FREE 3 ML: 5 INJECTION INTRAVENOUS at 20:47

## 2019-04-11 RX ADMIN — HYDRALAZINE HYDROCHLORIDE 100 MG: 50 TABLET ORAL at 20:47

## 2019-04-11 RX ADMIN — DESMOPRESSIN ACETATE 40 MG: 0.2 TABLET ORAL at 20:47

## 2019-04-11 RX ADMIN — ENOXAPARIN SODIUM 30 MG: 30 INJECTION SUBCUTANEOUS at 20:46

## 2019-04-11 RX ADMIN — CARVEDILOL 25 MG: 25 TABLET, FILM COATED ORAL at 08:01

## 2019-04-11 RX ADMIN — ASPIRIN 81 MG: 81 TABLET, DELAYED RELEASE ORAL at 08:01

## 2019-04-11 RX ADMIN — AMLODIPINE BESYLATE 10 MG: 10 TABLET ORAL at 08:01

## 2019-04-11 RX ADMIN — ALLOPURINOL 100 MG: 100 TABLET ORAL at 20:47

## 2019-04-11 RX ADMIN — LISINOPRIL 40 MG: 20 TABLET ORAL at 08:01

## 2019-04-11 RX ADMIN — FAMOTIDINE 20 MG: 20 TABLET, FILM COATED ORAL at 08:01

## 2019-04-11 RX ADMIN — CARVEDILOL 25 MG: 25 TABLET, FILM COATED ORAL at 20:47

## 2019-04-11 RX ADMIN — HYDROCHLOROTHIAZIDE 25 MG: 25 TABLET ORAL at 08:01

## 2019-04-11 RX ADMIN — LABETALOL 20 MG/4 ML (5 MG/ML) INTRAVENOUS SYRINGE 10 MG: at 05:20

## 2019-04-11 RX ADMIN — PRASUGREL HYDROCHLORIDE 10 MG: 10 TABLET, FILM COATED ORAL at 08:01

## 2019-04-11 RX ADMIN — FLUTICASONE PROPIONATE 2 SPRAY: 50 SPRAY, METERED NASAL at 08:05

## 2019-04-12 VITALS
DIASTOLIC BLOOD PRESSURE: 76 MMHG | TEMPERATURE: 98.3 F | WEIGHT: 141 LBS | RESPIRATION RATE: 18 BRPM | HEIGHT: 70 IN | SYSTOLIC BLOOD PRESSURE: 158 MMHG | OXYGEN SATURATION: 100 % | BODY MASS INDEX: 20.19 KG/M2 | HEART RATE: 83 BPM

## 2019-04-12 LAB
ANION GAP SERPL CALCULATED.3IONS-SCNC: 8 MMOL/L (ref 4–13)
BASOPHILS # BLD AUTO: 0.01 10*3/MM3 (ref 0–0.2)
BASOPHILS NFR BLD AUTO: 0.2 % (ref 0–2)
BUN BLD-MCNC: 23 MG/DL (ref 5–21)
BUN/CREAT SERPL: 10.6 (ref 7–25)
CALCIUM SPEC-SCNC: 8.6 MG/DL (ref 8.4–10.4)
CHLORIDE SERPL-SCNC: 111 MMOL/L (ref 98–110)
CO2 SERPL-SCNC: 21 MMOL/L (ref 24–31)
CREAT BLD-MCNC: 2.18 MG/DL (ref 0.5–1.4)
DEPRECATED RDW RBC AUTO: 44.1 FL (ref 40–54)
EOSINOPHIL # BLD AUTO: 0.27 10*3/MM3 (ref 0–0.7)
EOSINOPHIL NFR BLD AUTO: 5.4 % (ref 0–4)
ERYTHROCYTE [DISTWIDTH] IN BLOOD BY AUTOMATED COUNT: 13 % (ref 12–15)
GFR SERPL CREATININE-BSD FRML MDRD: 36 ML/MIN/1.73
GLUCOSE BLD-MCNC: 85 MG/DL (ref 70–100)
HCT VFR BLD AUTO: 24.2 % (ref 40–52)
HGB BLD-MCNC: 8.4 G/DL (ref 14–18)
IMM GRANULOCYTES # BLD AUTO: 0.01 10*3/MM3 (ref 0–0.05)
IMM GRANULOCYTES NFR BLD AUTO: 0.2 % (ref 0–5)
LYMPHOCYTES # BLD AUTO: 1.38 10*3/MM3 (ref 0.72–4.86)
LYMPHOCYTES NFR BLD AUTO: 27.4 % (ref 15–45)
MCH RBC QN AUTO: 32.2 PG (ref 28–32)
MCHC RBC AUTO-ENTMCNC: 34.7 G/DL (ref 33–36)
MCV RBC AUTO: 92.7 FL (ref 82–95)
MONOCYTES # BLD AUTO: 0.57 10*3/MM3 (ref 0.19–1.3)
MONOCYTES NFR BLD AUTO: 11.3 % (ref 4–12)
NEUTROPHILS # BLD AUTO: 2.8 10*3/MM3 (ref 1.87–8.4)
NEUTROPHILS NFR BLD AUTO: 55.5 % (ref 39–78)
NRBC BLD AUTO-RTO: 0 /100 WBC (ref 0–0)
PLATELET # BLD AUTO: 142 10*3/MM3 (ref 130–400)
PMV BLD AUTO: 9.8 FL (ref 6–12)
POTASSIUM BLD-SCNC: 4.3 MMOL/L (ref 3.5–5.3)
RBC # BLD AUTO: 2.61 10*6/MM3 (ref 4.8–5.9)
SODIUM BLD-SCNC: 140 MMOL/L (ref 135–145)
WBC NRBC COR # BLD: 5.04 10*3/MM3 (ref 4.8–10.8)

## 2019-04-12 PROCEDURE — 94760 N-INVAS EAR/PLS OXIMETRY 1: CPT

## 2019-04-12 PROCEDURE — 94799 UNLISTED PULMONARY SVC/PX: CPT

## 2019-04-12 PROCEDURE — 80048 BASIC METABOLIC PNL TOTAL CA: CPT | Performed by: INTERNAL MEDICINE

## 2019-04-12 PROCEDURE — 99239 HOSP IP/OBS DSCHRG MGMT >30: CPT | Performed by: INTERNAL MEDICINE

## 2019-04-12 PROCEDURE — 85025 COMPLETE CBC W/AUTO DIFF WBC: CPT | Performed by: INTERNAL MEDICINE

## 2019-04-12 RX ORDER — AMLODIPINE BESYLATE 10 MG/1
10 TABLET ORAL
Qty: 90 TABLET | Refills: 3 | Status: SHIPPED | OUTPATIENT
Start: 2019-04-12 | End: 2019-04-12

## 2019-04-12 RX ORDER — HYDRALAZINE HYDROCHLORIDE 100 MG/1
100 TABLET, FILM COATED ORAL EVERY 12 HOURS SCHEDULED
Qty: 180 TABLET | Refills: 3 | Status: SHIPPED | OUTPATIENT
Start: 2019-04-12 | End: 2021-01-01

## 2019-04-12 RX ORDER — LISINOPRIL 40 MG/1
40 TABLET ORAL DAILY
Qty: 90 TABLET | Refills: 3 | Status: SHIPPED | OUTPATIENT
Start: 2019-04-12 | End: 2019-04-12

## 2019-04-12 RX ORDER — HYDRALAZINE HYDROCHLORIDE 100 MG/1
100 TABLET, FILM COATED ORAL EVERY 12 HOURS SCHEDULED
Qty: 180 TABLET | Refills: 3 | Status: SHIPPED | OUTPATIENT
Start: 2019-04-12 | End: 2019-04-12

## 2019-04-12 RX ORDER — NITROGLYCERIN 0.4 MG/1
0.4 TABLET SUBLINGUAL
Qty: 100 TABLET | Refills: 12 | Status: SHIPPED | OUTPATIENT
Start: 2019-04-12 | End: 2019-04-12 | Stop reason: SDUPTHER

## 2019-04-12 RX ORDER — AMLODIPINE BESYLATE 10 MG/1
10 TABLET ORAL
Qty: 90 TABLET | Refills: 3 | Status: SHIPPED | OUTPATIENT
Start: 2019-04-12 | End: 2021-01-01

## 2019-04-12 RX ORDER — NITROGLYCERIN 0.4 MG/1
0.4 TABLET SUBLINGUAL
Qty: 100 TABLET | Refills: 12 | Status: SHIPPED | OUTPATIENT
Start: 2019-04-12

## 2019-04-12 RX ORDER — CLONIDINE HYDROCHLORIDE 0.1 MG/1
0.1 TABLET ORAL 2 TIMES DAILY PRN
Qty: 180 TABLET | Refills: 3 | Status: SHIPPED | OUTPATIENT
Start: 2019-04-12 | End: 2019-04-12 | Stop reason: SDUPTHER

## 2019-04-12 RX ORDER — LISINOPRIL 40 MG/1
40 TABLET ORAL DAILY
Qty: 90 TABLET | Refills: 3 | Status: SHIPPED | OUTPATIENT
Start: 2019-04-12 | End: 2021-01-01 | Stop reason: SDUPTHER

## 2019-04-12 RX ORDER — CLONIDINE HYDROCHLORIDE 0.1 MG/1
0.1 TABLET ORAL 2 TIMES DAILY PRN
Qty: 180 TABLET | Refills: 3 | Status: SHIPPED | OUTPATIENT
Start: 2019-04-12

## 2019-04-12 RX ORDER — PRASUGREL 10 MG/1
10 TABLET, FILM COATED ORAL DAILY
Qty: 90 TABLET | Refills: 3 | Status: SHIPPED | OUTPATIENT
Start: 2019-04-12

## 2019-04-12 RX ORDER — ATORVASTATIN CALCIUM 40 MG/1
40 TABLET, FILM COATED ORAL NIGHTLY
Qty: 90 TABLET | Refills: 3 | Status: SHIPPED | OUTPATIENT
Start: 2019-04-12

## 2019-04-12 RX ORDER — ATORVASTATIN CALCIUM 40 MG/1
40 TABLET, FILM COATED ORAL NIGHTLY
Qty: 90 TABLET | Refills: 3 | Status: SHIPPED | OUTPATIENT
Start: 2019-04-12 | End: 2019-04-12

## 2019-04-12 RX ORDER — CARVEDILOL 25 MG/1
25 TABLET ORAL 2 TIMES DAILY WITH MEALS
Qty: 180 TABLET | Refills: 3 | Status: SHIPPED | OUTPATIENT
Start: 2019-04-12 | End: 2021-01-01 | Stop reason: SDUPTHER

## 2019-04-12 RX ADMIN — FLUTICASONE PROPIONATE 2 SPRAY: 50 SPRAY, METERED NASAL at 08:49

## 2019-04-12 RX ADMIN — HYDROCHLOROTHIAZIDE 25 MG: 25 TABLET ORAL at 08:49

## 2019-04-12 RX ADMIN — LABETALOL 20 MG/4 ML (5 MG/ML) INTRAVENOUS SYRINGE 10 MG: at 02:54

## 2019-04-12 RX ADMIN — Medication 1 TABLET: at 08:49

## 2019-04-12 RX ADMIN — FAMOTIDINE 20 MG: 20 TABLET, FILM COATED ORAL at 08:49

## 2019-04-12 RX ADMIN — CALCITRIOL 0.25 MCG: 0.25 CAPSULE ORAL at 08:49

## 2019-04-12 RX ADMIN — AMLODIPINE BESYLATE 10 MG: 10 TABLET ORAL at 08:49

## 2019-04-12 RX ADMIN — PRASUGREL HYDROCHLORIDE 10 MG: 10 TABLET, FILM COATED ORAL at 08:49

## 2019-04-12 RX ADMIN — ASPIRIN 81 MG: 81 TABLET, DELAYED RELEASE ORAL at 08:49

## 2019-04-12 RX ADMIN — LISINOPRIL 40 MG: 20 TABLET ORAL at 08:49

## 2019-04-12 RX ADMIN — CARVEDILOL 25 MG: 25 TABLET, FILM COATED ORAL at 08:49

## 2019-04-12 RX ADMIN — SODIUM CHLORIDE, PRESERVATIVE FREE 3 ML: 5 INJECTION INTRAVENOUS at 08:48

## 2019-04-12 RX ADMIN — HYDRALAZINE HYDROCHLORIDE 100 MG: 50 TABLET ORAL at 08:49

## 2019-04-13 ENCOUNTER — READMISSION MANAGEMENT (OUTPATIENT)
Dept: CALL CENTER | Facility: HOSPITAL | Age: 74
End: 2019-04-13

## 2019-04-16 ENCOUNTER — READMISSION MANAGEMENT (OUTPATIENT)
Dept: CALL CENTER | Facility: HOSPITAL | Age: 74
End: 2019-04-16

## 2019-04-23 ENCOUNTER — READMISSION MANAGEMENT (OUTPATIENT)
Dept: CALL CENTER | Facility: HOSPITAL | Age: 74
End: 2019-04-23

## 2019-05-13 ENCOUNTER — OFFICE VISIT (OUTPATIENT)
Dept: CARDIOLOGY | Facility: CLINIC | Age: 74
End: 2019-05-13

## 2019-05-13 VITALS
HEIGHT: 70 IN | SYSTOLIC BLOOD PRESSURE: 120 MMHG | OXYGEN SATURATION: 99 % | BODY MASS INDEX: 20.19 KG/M2 | DIASTOLIC BLOOD PRESSURE: 60 MMHG | HEART RATE: 85 BPM | WEIGHT: 141 LBS

## 2019-05-13 DIAGNOSIS — F17.200 SMOKER: ICD-10-CM

## 2019-05-13 DIAGNOSIS — I10 ESSENTIAL HYPERTENSION: ICD-10-CM

## 2019-05-13 DIAGNOSIS — N18.9 CHRONIC KIDNEY DISEASE, UNSPECIFIED CKD STAGE: ICD-10-CM

## 2019-05-13 DIAGNOSIS — R07.89 OTHER CHEST PAIN: Primary | ICD-10-CM

## 2019-05-13 DIAGNOSIS — I25.10 CORONARY ARTERY DISEASE INVOLVING NATIVE CORONARY ARTERY OF NATIVE HEART WITHOUT ANGINA PECTORIS: ICD-10-CM

## 2019-05-13 DIAGNOSIS — Z95.5 STENTED CORONARY ARTERY: ICD-10-CM

## 2019-05-13 PROCEDURE — 99214 OFFICE O/P EST MOD 30 MIN: CPT | Performed by: INTERNAL MEDICINE

## 2019-06-27 PROCEDURE — 93005 ELECTROCARDIOGRAM TRACING: CPT | Performed by: INTERNAL MEDICINE

## 2019-10-11 ENCOUNTER — HOSPITAL ENCOUNTER (EMERGENCY)
Facility: HOSPITAL | Age: 74
Discharge: HOME | End: 2019-10-11
Admitting: EMERGENCY MEDICINE

## 2019-10-11 ENCOUNTER — APPOINTMENT (OUTPATIENT)
Dept: GENERAL RADIOLOGY | Facility: HOSPITAL | Age: 74
End: 2019-10-11

## 2019-10-11 ENCOUNTER — APPOINTMENT (OUTPATIENT)
Dept: CARDIOLOGY | Facility: HOSPITAL | Age: 74
End: 2019-10-11

## 2019-10-11 VITALS
OXYGEN SATURATION: 98 % | HEART RATE: 86 BPM | SYSTOLIC BLOOD PRESSURE: 147 MMHG | BODY MASS INDEX: 20.76 KG/M2 | DIASTOLIC BLOOD PRESSURE: 76 MMHG | RESPIRATION RATE: 16 BRPM | TEMPERATURE: 98.2 F | WEIGHT: 145 LBS | HEIGHT: 70 IN

## 2019-10-11 DIAGNOSIS — R07.9 CHEST PAIN, UNSPECIFIED TYPE: Primary | ICD-10-CM

## 2019-10-11 LAB
ALBUMIN SERPL-MCNC: 3.9 G/DL (ref 3.5–5.2)
ALBUMIN/GLOB SERPL: 1.4 G/DL
ALP SERPL-CCNC: 67 U/L (ref 39–117)
ALT SERPL W P-5'-P-CCNC: 11 U/L (ref 1–41)
ANION GAP SERPL CALCULATED.3IONS-SCNC: 13 MMOL/L (ref 5–15)
AST SERPL-CCNC: 19 U/L (ref 1–40)
BACTERIA UR QL AUTO: ABNORMAL /HPF
BASOPHILS # BLD AUTO: 0.01 10*3/MM3 (ref 0–0.2)
BASOPHILS NFR BLD AUTO: 0.2 % (ref 0–1.5)
BH CV STRESS BP STAGE 1: NORMAL
BH CV STRESS BP STAGE 2: NORMAL
BH CV STRESS DURATION MIN STAGE 1: 3
BH CV STRESS DURATION MIN STAGE 2: 2
BH CV STRESS DURATION SEC STAGE 1: 0
BH CV STRESS DURATION SEC STAGE 2: 1
BH CV STRESS GRADE STAGE 1: 10
BH CV STRESS GRADE STAGE 2: 12
BH CV STRESS HR STAGE 1: 109
BH CV STRESS HR STAGE 2: 128
BH CV STRESS METS STAGE 1: 5
BH CV STRESS METS STAGE 2: 7.5
BH CV STRESS PROTOCOL 1: NORMAL
BH CV STRESS RECOVERY BP: NORMAL MMHG
BH CV STRESS RECOVERY HR: 93 BPM
BH CV STRESS SPEED STAGE 1: 1.7
BH CV STRESS SPEED STAGE 2: 2.5
BH CV STRESS STAGE 1: 1
BH CV STRESS STAGE 2: 2
BILIRUB SERPL-MCNC: 0.2 MG/DL (ref 0.2–1.2)
BILIRUB UR QL STRIP: NEGATIVE
BUN BLD-MCNC: 40 MG/DL (ref 8–23)
BUN/CREAT SERPL: 14.4 (ref 7–25)
CALCIUM SPEC-SCNC: 9.3 MG/DL (ref 8.6–10.5)
CHLORIDE SERPL-SCNC: 106 MMOL/L (ref 98–107)
CLARITY UR: CLEAR
CO2 SERPL-SCNC: 22 MMOL/L (ref 22–29)
COLOR UR: YELLOW
CREAT BLD-MCNC: 2.78 MG/DL (ref 0.76–1.27)
DEPRECATED RDW RBC AUTO: 43.6 FL (ref 37–54)
EOSINOPHIL # BLD AUTO: 0.32 10*3/MM3 (ref 0–0.4)
EOSINOPHIL NFR BLD AUTO: 6.5 % (ref 0.3–6.2)
ERYTHROCYTE [DISTWIDTH] IN BLOOD BY AUTOMATED COUNT: 12.7 % (ref 12.3–15.4)
GFR SERPL CREATININE-BSD FRML MDRD: 27 ML/MIN/1.73
GLOBULIN UR ELPH-MCNC: 2.8 GM/DL
GLUCOSE BLD-MCNC: 109 MG/DL (ref 65–99)
GLUCOSE UR STRIP-MCNC: NEGATIVE MG/DL
HCT VFR BLD AUTO: 31.2 % (ref 37.5–51)
HGB BLD-MCNC: 10.6 G/DL (ref 13–17.7)
HGB UR QL STRIP.AUTO: NEGATIVE
HYALINE CASTS UR QL AUTO: ABNORMAL /LPF
IMM GRANULOCYTES # BLD AUTO: 0.01 10*3/MM3 (ref 0–0.05)
IMM GRANULOCYTES NFR BLD AUTO: 0.2 % (ref 0–0.5)
KETONES UR QL STRIP: NEGATIVE
LEUKOCYTE ESTERASE UR QL STRIP.AUTO: NEGATIVE
LYMPHOCYTES # BLD AUTO: 1.26 10*3/MM3 (ref 0.7–3.1)
LYMPHOCYTES NFR BLD AUTO: 25.6 % (ref 19.6–45.3)
MAXIMAL PREDICTED HEART RATE: 147 BPM
MCH RBC QN AUTO: 31.5 PG (ref 26.6–33)
MCHC RBC AUTO-ENTMCNC: 34 G/DL (ref 31.5–35.7)
MCV RBC AUTO: 92.9 FL (ref 79–97)
MONOCYTES # BLD AUTO: 0.45 10*3/MM3 (ref 0.1–0.9)
MONOCYTES NFR BLD AUTO: 9.1 % (ref 5–12)
NEUTROPHILS # BLD AUTO: 2.88 10*3/MM3 (ref 1.7–7)
NEUTROPHILS NFR BLD AUTO: 58.4 % (ref 42.7–76)
NITRITE UR QL STRIP: NEGATIVE
NRBC BLD AUTO-RTO: 0 /100 WBC (ref 0–0.2)
NT-PROBNP SERPL-MCNC: 738.4 PG/ML (ref 5–900)
PERCENT MAX PREDICTED HR: 87.07 %
PH UR STRIP.AUTO: 6 [PH] (ref 5–8)
PLATELET # BLD AUTO: 172 10*3/MM3 (ref 140–450)
PMV BLD AUTO: 9.4 FL (ref 6–12)
POTASSIUM BLD-SCNC: 4.3 MMOL/L (ref 3.5–5.2)
PROT SERPL-MCNC: 6.7 G/DL (ref 6–8.5)
PROT UR QL STRIP: ABNORMAL
RBC # BLD AUTO: 3.36 10*6/MM3 (ref 4.14–5.8)
RBC # UR: ABNORMAL /HPF
REF LAB TEST METHOD: ABNORMAL
SODIUM BLD-SCNC: 141 MMOL/L (ref 136–145)
SP GR UR STRIP: 1.01 (ref 1–1.03)
SQUAMOUS #/AREA URNS HPF: ABNORMAL /HPF
STRESS BASELINE BP: NORMAL MMHG
STRESS BASELINE HR: 84 BPM
STRESS PERCENT HR: 102 %
STRESS POST EXERCISE DUR MIN: 5 MIN
STRESS POST PEAK BP: NORMAL MMHG
STRESS POST PEAK HR: 128 BPM
STRESS TARGET HR: 125 BPM
TROPONIN T SERPL-MCNC: <0.01 NG/ML (ref 0–0.03)
TROPONIN T SERPL-MCNC: <0.01 NG/ML (ref 0–0.03)
UROBILINOGEN UR QL STRIP: ABNORMAL
WBC NRBC COR # BLD: 4.93 10*3/MM3 (ref 3.4–10.8)
WBC UR QL AUTO: ABNORMAL /HPF

## 2019-10-11 PROCEDURE — 93017 CV STRESS TEST TRACING ONLY: CPT

## 2019-10-11 PROCEDURE — 83880 ASSAY OF NATRIURETIC PEPTIDE: CPT | Performed by: PHYSICIAN ASSISTANT

## 2019-10-11 PROCEDURE — 99285 EMERGENCY DEPT VISIT HI MDM: CPT

## 2019-10-11 PROCEDURE — 93352 ADMIN ECG CONTRAST AGENT: CPT | Performed by: INTERNAL MEDICINE

## 2019-10-11 PROCEDURE — 85025 COMPLETE CBC W/AUTO DIFF WBC: CPT | Performed by: PHYSICIAN ASSISTANT

## 2019-10-11 PROCEDURE — 93005 ELECTROCARDIOGRAM TRACING: CPT | Performed by: EMERGENCY MEDICINE

## 2019-10-11 PROCEDURE — 25010000002 PERFLUTREN 6.52 MG/ML SUSPENSION: Performed by: PHYSICIAN ASSISTANT

## 2019-10-11 PROCEDURE — 84484 ASSAY OF TROPONIN QUANT: CPT | Performed by: PHYSICIAN ASSISTANT

## 2019-10-11 PROCEDURE — 71045 X-RAY EXAM CHEST 1 VIEW: CPT

## 2019-10-11 PROCEDURE — 81001 URINALYSIS AUTO W/SCOPE: CPT | Performed by: PHYSICIAN ASSISTANT

## 2019-10-11 PROCEDURE — 93350 STRESS TTE ONLY: CPT

## 2019-10-11 PROCEDURE — 93350 STRESS TTE ONLY: CPT | Performed by: INTERNAL MEDICINE

## 2019-10-11 PROCEDURE — 93018 CV STRESS TEST I&R ONLY: CPT | Performed by: INTERNAL MEDICINE

## 2019-10-11 PROCEDURE — 96374 THER/PROPH/DIAG INJ IV PUSH: CPT

## 2019-10-11 PROCEDURE — 93010 ELECTROCARDIOGRAM REPORT: CPT | Performed by: INTERNAL MEDICINE

## 2019-10-11 PROCEDURE — 80053 COMPREHEN METABOLIC PANEL: CPT | Performed by: PHYSICIAN ASSISTANT

## 2019-10-11 RX ORDER — ASPIRIN 81 MG/1
324 TABLET, CHEWABLE ORAL ONCE
Status: DISCONTINUED | OUTPATIENT
Start: 2019-10-11 | End: 2019-10-11

## 2019-10-11 RX ORDER — ONDANSETRON 4 MG/1
4 TABLET, ORALLY DISINTEGRATING ORAL EVERY 6 HOURS PRN
Qty: 12 TABLET | Refills: 0 | Status: SHIPPED | OUTPATIENT
Start: 2019-10-11

## 2019-10-11 RX ORDER — ASPIRIN 81 MG/1
243 TABLET, CHEWABLE ORAL ONCE
Status: COMPLETED | OUTPATIENT
Start: 2019-10-11 | End: 2019-10-11

## 2019-10-11 RX ORDER — ONDANSETRON 2 MG/ML
4 INJECTION INTRAMUSCULAR; INTRAVENOUS ONCE
Status: DISCONTINUED | OUTPATIENT
Start: 2019-10-11 | End: 2019-10-11 | Stop reason: HOSPADM

## 2019-10-11 RX ADMIN — ASPIRIN 81 MG 243 MG: 81 TABLET ORAL at 09:40

## 2019-10-11 RX ADMIN — PERFLUTREN 8.48 MG: 6.52 INJECTION, SUSPENSION INTRAVENOUS at 14:20

## 2019-10-29 ENCOUNTER — TRANSCRIBE ORDERS (OUTPATIENT)
Dept: ADMINISTRATIVE | Facility: HOSPITAL | Age: 74
End: 2019-10-29

## 2019-10-29 DIAGNOSIS — G45.8 OTHER SPECIFIED TRANSIENT CEREBRAL ISCHEMIAS: Primary | ICD-10-CM

## 2019-11-06 ENCOUNTER — HOSPITAL ENCOUNTER (OUTPATIENT)
Dept: ULTRASOUND IMAGING | Facility: HOSPITAL | Age: 74
Discharge: HOME | End: 2019-11-06
Admitting: FAMILY MEDICINE

## 2019-11-06 DIAGNOSIS — G45.8 OTHER SPECIFIED TRANSIENT CEREBRAL ISCHEMIAS: ICD-10-CM

## 2019-11-06 PROCEDURE — 93880 EXTRACRANIAL BILAT STUDY: CPT | Performed by: SURGERY

## 2019-11-06 PROCEDURE — 93880 EXTRACRANIAL BILAT STUDY: CPT

## 2020-01-22 ENCOUNTER — OFFICE VISIT (OUTPATIENT)
Dept: CARDIOLOGY | Facility: CLINIC | Age: 75
End: 2020-01-22

## 2020-01-22 VITALS
DIASTOLIC BLOOD PRESSURE: 70 MMHG | HEART RATE: 87 BPM | HEIGHT: 70 IN | SYSTOLIC BLOOD PRESSURE: 140 MMHG | BODY MASS INDEX: 21.62 KG/M2 | WEIGHT: 151 LBS | OXYGEN SATURATION: 99 %

## 2020-01-22 DIAGNOSIS — R00.2 PALPITATIONS: ICD-10-CM

## 2020-01-22 DIAGNOSIS — F17.200 SMOKER: ICD-10-CM

## 2020-01-22 DIAGNOSIS — R07.89 OTHER CHEST PAIN: ICD-10-CM

## 2020-01-22 DIAGNOSIS — N18.9 CHRONIC KIDNEY DISEASE, UNSPECIFIED CKD STAGE: ICD-10-CM

## 2020-01-22 DIAGNOSIS — I20.0 UNSTABLE ANGINA PECTORIS (HCC): Primary | ICD-10-CM

## 2020-01-22 DIAGNOSIS — Z95.5 STENTED CORONARY ARTERY: ICD-10-CM

## 2020-01-22 DIAGNOSIS — I10 ESSENTIAL HYPERTENSION: ICD-10-CM

## 2020-01-22 DIAGNOSIS — I25.10 CORONARY ARTERY DISEASE INVOLVING NATIVE CORONARY ARTERY OF NATIVE HEART WITHOUT ANGINA PECTORIS: ICD-10-CM

## 2020-01-22 DIAGNOSIS — R07.2 PRECORDIAL CHEST PAIN: ICD-10-CM

## 2020-01-22 PROCEDURE — 99214 OFFICE O/P EST MOD 30 MIN: CPT | Performed by: INTERNAL MEDICINE

## 2020-01-22 RX ORDER — ISOSORBIDE MONONITRATE 30 MG/1
30 TABLET, EXTENDED RELEASE ORAL EVERY MORNING
Qty: 90 TABLET | Refills: 3 | Status: SHIPPED | OUTPATIENT
Start: 2020-01-22 | End: 2020-01-27 | Stop reason: SDUPTHER

## 2020-01-27 RX ORDER — ISOSORBIDE MONONITRATE 30 MG/1
30 TABLET, EXTENDED RELEASE ORAL EVERY MORNING
Qty: 90 TABLET | Refills: 3 | Status: SHIPPED | OUTPATIENT
Start: 2020-01-27 | End: 2020-01-30 | Stop reason: SDUPTHER

## 2020-01-29 ENCOUNTER — TELEPHONE (OUTPATIENT)
Dept: CARDIOLOGY | Facility: CLINIC | Age: 75
End: 2020-01-29

## 2020-01-31 RX ORDER — ISOSORBIDE MONONITRATE 30 MG/1
30 TABLET, EXTENDED RELEASE ORAL EVERY MORNING
Qty: 90 TABLET | Refills: 4 | Status: ON HOLD | OUTPATIENT
Start: 2020-01-31 | End: 2020-03-02 | Stop reason: SDUPTHER

## 2020-02-12 ENCOUNTER — HOSPITAL ENCOUNTER (OUTPATIENT)
Dept: CARDIOLOGY | Facility: HOSPITAL | Age: 75
Discharge: HOME | End: 2020-02-12

## 2020-02-12 VITALS
WEIGHT: 151.01 LBS | HEART RATE: 74 BPM | DIASTOLIC BLOOD PRESSURE: 80 MMHG | HEIGHT: 70 IN | SYSTOLIC BLOOD PRESSURE: 137 MMHG | BODY MASS INDEX: 21.62 KG/M2

## 2020-02-12 DIAGNOSIS — R07.2 PRECORDIAL CHEST PAIN: ICD-10-CM

## 2020-02-12 PROCEDURE — 78452 HT MUSCLE IMAGE SPECT MULT: CPT | Performed by: INTERNAL MEDICINE

## 2020-02-12 PROCEDURE — 78452 HT MUSCLE IMAGE SPECT MULT: CPT

## 2020-02-12 PROCEDURE — 93017 CV STRESS TEST TRACING ONLY: CPT

## 2020-02-12 PROCEDURE — A9500 TC99M SESTAMIBI: HCPCS | Performed by: INTERNAL MEDICINE

## 2020-02-12 PROCEDURE — 25010000002 REGADENOSON 0.4 MG/5ML SOLUTION: Performed by: INTERNAL MEDICINE

## 2020-02-12 PROCEDURE — 0 TECHNETIUM SESTAMIBI: Performed by: INTERNAL MEDICINE

## 2020-02-12 PROCEDURE — 93018 CV STRESS TEST I&R ONLY: CPT | Performed by: INTERNAL MEDICINE

## 2020-02-12 RX ADMIN — TECHNETIUM TC 99M SESTAMIBI 1 DOSE: 1 INJECTION INTRAVENOUS at 08:20

## 2020-02-12 RX ADMIN — CAFFEINE CITRATE 60 MG: 20 INJECTION, SOLUTION INTRAVENOUS at 10:08

## 2020-02-12 RX ADMIN — REGADENOSON 0.4 MG: 0.08 INJECTION, SOLUTION INTRAVENOUS at 09:54

## 2020-02-12 RX ADMIN — TECHNETIUM TC 99M SESTAMIBI 1 DOSE: 1 INJECTION INTRAVENOUS at 09:45

## 2020-02-13 LAB
BH CV NUCLEAR PRIOR STUDY: 3
BH CV STRESS BP STAGE 1: NORMAL
BH CV STRESS COMMENTS STAGE 1: NORMAL
BH CV STRESS DOSE REGADENOSON STAGE 1: 0.4
BH CV STRESS DURATION MIN STAGE 1: 0
BH CV STRESS DURATION SEC STAGE 1: 10
BH CV STRESS HR STAGE 1: 108
BH CV STRESS PROTOCOL 1: NORMAL
BH CV STRESS RECOVERY BP: NORMAL MMHG
BH CV STRESS RECOVERY HR: 87 BPM
BH CV STRESS STAGE 1: 1
LV EF NUC BP: 33 %
MAXIMAL PREDICTED HEART RATE: 147 BPM
PERCENT MAX PREDICTED HR: 73.47 %
STRESS BASELINE BP: NORMAL MMHG
STRESS BASELINE HR: 74 BPM
STRESS PERCENT HR: 86 %
STRESS POST EXERCISE DUR MIN: 0 MIN
STRESS POST EXERCISE DUR SEC: 10 SEC
STRESS POST PEAK BP: NORMAL MMHG
STRESS POST PEAK HR: 108 BPM
STRESS TARGET HR: 125 BPM

## 2020-02-28 ENCOUNTER — HOSPITAL ENCOUNTER (OUTPATIENT)
Facility: HOSPITAL | Age: 75
Setting detail: OBSERVATION
Discharge: HOME | End: 2020-03-02
Attending: EMERGENCY MEDICINE | Admitting: INTERNAL MEDICINE

## 2020-02-28 ENCOUNTER — APPOINTMENT (OUTPATIENT)
Dept: GENERAL RADIOLOGY | Facility: HOSPITAL | Age: 75
End: 2020-02-28

## 2020-02-28 DIAGNOSIS — R07.9 CHEST PAIN, UNSPECIFIED TYPE: Primary | ICD-10-CM

## 2020-02-28 LAB
ALBUMIN SERPL-MCNC: 4 G/DL (ref 3.5–5.2)
ALBUMIN/GLOB SERPL: 1.3 G/DL
ALP SERPL-CCNC: 66 U/L (ref 39–117)
ALT SERPL W P-5'-P-CCNC: 9 U/L (ref 1–41)
ANION GAP SERPL CALCULATED.3IONS-SCNC: 12 MMOL/L (ref 5–15)
APTT PPP: 36.9 SECONDS (ref 24.1–35)
AST SERPL-CCNC: 20 U/L (ref 1–40)
BASOPHILS # BLD AUTO: 0.02 10*3/MM3 (ref 0–0.2)
BASOPHILS NFR BLD AUTO: 0.4 % (ref 0–1.5)
BILIRUB SERPL-MCNC: 0.2 MG/DL (ref 0.2–1.2)
BUN BLD-MCNC: 35 MG/DL (ref 8–23)
BUN/CREAT SERPL: 12 (ref 7–25)
CALCIUM SPEC-SCNC: 9.7 MG/DL (ref 8.6–10.5)
CHLORIDE SERPL-SCNC: 105 MMOL/L (ref 98–107)
CO2 SERPL-SCNC: 23 MMOL/L (ref 22–29)
CREAT BLD-MCNC: 2.92 MG/DL (ref 0.76–1.27)
DEPRECATED RDW RBC AUTO: 45.7 FL (ref 37–54)
EOSINOPHIL # BLD AUTO: 0.35 10*3/MM3 (ref 0–0.4)
EOSINOPHIL NFR BLD AUTO: 6.5 % (ref 0.3–6.2)
ERYTHROCYTE [DISTWIDTH] IN BLOOD BY AUTOMATED COUNT: 13.2 % (ref 12.3–15.4)
GFR SERPL CREATININE-BSD FRML MDRD: 26 ML/MIN/1.73
GLOBULIN UR ELPH-MCNC: 3 GM/DL
GLUCOSE BLD-MCNC: 112 MG/DL (ref 65–99)
HCT VFR BLD AUTO: 31 % (ref 37.5–51)
HGB BLD-MCNC: 10.6 G/DL (ref 13–17.7)
HOLD SPECIMEN: NORMAL
HOLD SPECIMEN: NORMAL
IMM GRANULOCYTES # BLD AUTO: 0.02 10*3/MM3 (ref 0–0.05)
IMM GRANULOCYTES NFR BLD AUTO: 0.4 % (ref 0–0.5)
INR PPP: 1.04 (ref 0.91–1.09)
LYMPHOCYTES # BLD AUTO: 1.42 10*3/MM3 (ref 0.7–3.1)
LYMPHOCYTES NFR BLD AUTO: 26.5 % (ref 19.6–45.3)
MCH RBC QN AUTO: 32.2 PG (ref 26.6–33)
MCHC RBC AUTO-ENTMCNC: 34.2 G/DL (ref 31.5–35.7)
MCV RBC AUTO: 94.2 FL (ref 79–97)
MONOCYTES # BLD AUTO: 0.53 10*3/MM3 (ref 0.1–0.9)
MONOCYTES NFR BLD AUTO: 9.9 % (ref 5–12)
NEUTROPHILS # BLD AUTO: 3.02 10*3/MM3 (ref 1.7–7)
NEUTROPHILS NFR BLD AUTO: 56.3 % (ref 42.7–76)
NRBC BLD AUTO-RTO: 0 /100 WBC (ref 0–0.2)
NT-PROBNP SERPL-MCNC: 752.4 PG/ML (ref 5–900)
PLATELET # BLD AUTO: 198 10*3/MM3 (ref 140–450)
PMV BLD AUTO: 9.3 FL (ref 6–12)
POTASSIUM BLD-SCNC: 4.4 MMOL/L (ref 3.5–5.2)
PROT SERPL-MCNC: 7 G/DL (ref 6–8.5)
PROTHROMBIN TIME: 13.5 SECONDS (ref 11.9–14.6)
RBC # BLD AUTO: 3.29 10*6/MM3 (ref 4.14–5.8)
SODIUM BLD-SCNC: 140 MMOL/L (ref 136–145)
TROPONIN T SERPL-MCNC: <0.01 NG/ML (ref 0–0.03)
WBC NRBC COR # BLD: 5.36 10*3/MM3 (ref 3.4–10.8)
WHOLE BLOOD HOLD SPECIMEN: NORMAL
WHOLE BLOOD HOLD SPECIMEN: NORMAL

## 2020-02-28 PROCEDURE — 85730 THROMBOPLASTIN TIME PARTIAL: CPT | Performed by: EMERGENCY MEDICINE

## 2020-02-28 PROCEDURE — 93010 ELECTROCARDIOGRAM REPORT: CPT | Performed by: INTERNAL MEDICINE

## 2020-02-28 PROCEDURE — 93005 ELECTROCARDIOGRAM TRACING: CPT

## 2020-02-28 PROCEDURE — 83880 ASSAY OF NATRIURETIC PEPTIDE: CPT | Performed by: EMERGENCY MEDICINE

## 2020-02-28 PROCEDURE — 93005 ELECTROCARDIOGRAM TRACING: CPT | Performed by: EMERGENCY MEDICINE

## 2020-02-28 PROCEDURE — 71045 X-RAY EXAM CHEST 1 VIEW: CPT

## 2020-02-28 PROCEDURE — 36415 COLL VENOUS BLD VENIPUNCTURE: CPT

## 2020-02-28 PROCEDURE — 99284 EMERGENCY DEPT VISIT MOD MDM: CPT

## 2020-02-28 PROCEDURE — 80053 COMPREHEN METABOLIC PANEL: CPT

## 2020-02-28 PROCEDURE — 85610 PROTHROMBIN TIME: CPT | Performed by: EMERGENCY MEDICINE

## 2020-02-28 PROCEDURE — 85025 COMPLETE CBC W/AUTO DIFF WBC: CPT

## 2020-02-28 PROCEDURE — 84484 ASSAY OF TROPONIN QUANT: CPT

## 2020-02-28 RX ORDER — ASPIRIN 81 MG/1
324 TABLET, CHEWABLE ORAL ONCE
Status: COMPLETED | OUTPATIENT
Start: 2020-02-28 | End: 2020-02-28

## 2020-02-28 RX ORDER — SODIUM CHLORIDE 0.9 % (FLUSH) 0.9 %
10 SYRINGE (ML) INJECTION AS NEEDED
Status: DISCONTINUED | OUTPATIENT
Start: 2020-02-28 | End: 2020-03-02 | Stop reason: HOSPADM

## 2020-02-28 RX ADMIN — ASPIRIN 81 MG 324 MG: 81 TABLET ORAL at 23:36

## 2020-02-28 RX ADMIN — NITROGLYCERIN 1 INCH: 20 OINTMENT TOPICAL at 23:36

## 2020-02-29 ENCOUNTER — APPOINTMENT (OUTPATIENT)
Dept: NUCLEAR MEDICINE | Facility: HOSPITAL | Age: 75
End: 2020-02-29

## 2020-02-29 LAB
TROPONIN T SERPL-MCNC: 0.01 NG/ML (ref 0–0.03)
TROPONIN T SERPL-MCNC: <0.01 NG/ML (ref 0–0.03)
WHOLE BLOOD HOLD SPECIMEN: NORMAL

## 2020-02-29 PROCEDURE — 84484 ASSAY OF TROPONIN QUANT: CPT | Performed by: EMERGENCY MEDICINE

## 2020-02-29 PROCEDURE — G0378 HOSPITAL OBSERVATION PER HR: HCPCS

## 2020-02-29 PROCEDURE — A9540 TC99M MAA: HCPCS | Performed by: EMERGENCY MEDICINE

## 2020-02-29 PROCEDURE — 78582 LUNG VENTILAT&PERFUS IMAGING: CPT

## 2020-02-29 PROCEDURE — 0 TECHNETIUM ALBUMIN AGGREGATED: Performed by: EMERGENCY MEDICINE

## 2020-02-29 PROCEDURE — 36415 COLL VENOUS BLD VENIPUNCTURE: CPT | Performed by: EMERGENCY MEDICINE

## 2020-02-29 PROCEDURE — 93005 ELECTROCARDIOGRAM TRACING: CPT | Performed by: EMERGENCY MEDICINE

## 2020-02-29 PROCEDURE — 0 XENON XE 133: Performed by: EMERGENCY MEDICINE

## 2020-02-29 PROCEDURE — 99220 PR INITIAL OBSERVATION CARE/DAY 70 MINUTES: CPT | Performed by: INTERNAL MEDICINE

## 2020-02-29 PROCEDURE — A9558 XE133 XENON 10MCI: HCPCS | Performed by: EMERGENCY MEDICINE

## 2020-02-29 PROCEDURE — 93010 ELECTROCARDIOGRAM REPORT: CPT | Performed by: INTERNAL MEDICINE

## 2020-02-29 RX ORDER — LISINOPRIL 20 MG/1
40 TABLET ORAL DAILY
Status: DISCONTINUED | OUTPATIENT
Start: 2020-02-29 | End: 2020-03-02 | Stop reason: HOSPADM

## 2020-02-29 RX ORDER — HYDROCHLOROTHIAZIDE 25 MG/1
25 TABLET ORAL DAILY
Status: DISCONTINUED | OUTPATIENT
Start: 2020-02-29 | End: 2020-03-02 | Stop reason: HOSPADM

## 2020-02-29 RX ORDER — FAMOTIDINE 20 MG/1
20 TABLET, FILM COATED ORAL DAILY
Status: DISCONTINUED | OUTPATIENT
Start: 2020-03-01 | End: 2020-03-02 | Stop reason: HOSPADM

## 2020-02-29 RX ORDER — CLONIDINE HYDROCHLORIDE 0.1 MG/1
0.1 TABLET ORAL 2 TIMES DAILY PRN
Status: DISCONTINUED | OUTPATIENT
Start: 2020-02-29 | End: 2020-03-02 | Stop reason: HOSPADM

## 2020-02-29 RX ORDER — ONDANSETRON 4 MG/1
4 TABLET, ORALLY DISINTEGRATING ORAL EVERY 6 HOURS PRN
Status: DISCONTINUED | OUTPATIENT
Start: 2020-02-29 | End: 2020-03-02 | Stop reason: HOSPADM

## 2020-02-29 RX ORDER — FERROUS SULFATE 325(65) MG
325 TABLET ORAL
Status: DISCONTINUED | OUTPATIENT
Start: 2020-02-29 | End: 2020-03-02 | Stop reason: HOSPADM

## 2020-02-29 RX ORDER — PRASUGREL 10 MG/1
10 TABLET, FILM COATED ORAL DAILY
Status: DISCONTINUED | OUTPATIENT
Start: 2020-02-29 | End: 2020-03-02 | Stop reason: HOSPADM

## 2020-02-29 RX ORDER — ALLOPURINOL 100 MG/1
100 TABLET ORAL DAILY
Status: DISCONTINUED | OUTPATIENT
Start: 2020-02-29 | End: 2020-03-02 | Stop reason: HOSPADM

## 2020-02-29 RX ORDER — AMLODIPINE BESYLATE 10 MG/1
10 TABLET ORAL
Status: DISCONTINUED | OUTPATIENT
Start: 2020-02-29 | End: 2020-03-02 | Stop reason: HOSPADM

## 2020-02-29 RX ORDER — FLUTICASONE PROPIONATE 50 MCG
2 SPRAY, SUSPENSION (ML) NASAL DAILY
Status: DISCONTINUED | OUTPATIENT
Start: 2020-02-29 | End: 2020-03-02 | Stop reason: HOSPADM

## 2020-02-29 RX ORDER — ATORVASTATIN CALCIUM 40 MG/1
40 TABLET, FILM COATED ORAL NIGHTLY
Status: DISCONTINUED | OUTPATIENT
Start: 2020-02-29 | End: 2020-03-02 | Stop reason: HOSPADM

## 2020-02-29 RX ORDER — FAMOTIDINE 20 MG/1
20 TABLET, FILM COATED ORAL
Status: DISCONTINUED | OUTPATIENT
Start: 2020-02-29 | End: 2020-02-29

## 2020-02-29 RX ORDER — VITAMIN E 268 MG
400 CAPSULE ORAL DAILY
Status: DISCONTINUED | OUTPATIENT
Start: 2020-02-29 | End: 2020-03-02 | Stop reason: HOSPADM

## 2020-02-29 RX ORDER — LISINOPRIL 20 MG/1
40 TABLET ORAL DAILY
Status: DISCONTINUED | OUTPATIENT
Start: 2020-02-29 | End: 2020-02-29

## 2020-02-29 RX ORDER — ASPIRIN 81 MG/1
81 TABLET ORAL DAILY
Status: DISCONTINUED | OUTPATIENT
Start: 2020-02-29 | End: 2020-03-02 | Stop reason: HOSPADM

## 2020-02-29 RX ORDER — CARVEDILOL 25 MG/1
25 TABLET ORAL 2 TIMES DAILY WITH MEALS
Status: DISCONTINUED | OUTPATIENT
Start: 2020-02-29 | End: 2020-03-02 | Stop reason: HOSPADM

## 2020-02-29 RX ORDER — ISOSORBIDE MONONITRATE 60 MG/1
120 TABLET, EXTENDED RELEASE ORAL EVERY MORNING
Status: DISCONTINUED | OUTPATIENT
Start: 2020-02-29 | End: 2020-03-01

## 2020-02-29 RX ORDER — HYDRALAZINE HYDROCHLORIDE 50 MG/1
100 TABLET, FILM COATED ORAL EVERY 12 HOURS SCHEDULED
Status: DISCONTINUED | OUTPATIENT
Start: 2020-02-29 | End: 2020-03-02 | Stop reason: HOSPADM

## 2020-02-29 RX ORDER — NITROGLYCERIN 0.4 MG/1
0.4 TABLET SUBLINGUAL
Status: DISCONTINUED | OUTPATIENT
Start: 2020-02-29 | End: 2020-03-02 | Stop reason: HOSPADM

## 2020-02-29 RX ADMIN — XENON XE-133 16.5 MILLICURIE: 10 GAS RESPIRATORY (INHALATION) at 00:35

## 2020-02-29 RX ADMIN — FERROUS SULFATE TAB 325 MG (65 MG ELEMENTAL FE) 325 MG: 325 (65 FE) TAB at 11:55

## 2020-02-29 RX ADMIN — ISOSORBIDE MONONITRATE 120 MG: 60 TABLET, EXTENDED RELEASE ORAL at 11:02

## 2020-02-29 RX ADMIN — HYDRALAZINE HYDROCHLORIDE 100 MG: 50 TABLET ORAL at 11:02

## 2020-02-29 RX ADMIN — ASPIRIN 81 MG: 81 TABLET ORAL at 11:03

## 2020-02-29 RX ADMIN — LISINOPRIL 40 MG: 20 TABLET ORAL at 11:02

## 2020-02-29 RX ADMIN — ALLOPURINOL 100 MG: 100 TABLET ORAL at 11:03

## 2020-02-29 RX ADMIN — CARVEDILOL 25 MG: 25 TABLET, FILM COATED ORAL at 11:03

## 2020-02-29 RX ADMIN — HYDRALAZINE HYDROCHLORIDE 100 MG: 50 TABLET ORAL at 21:00

## 2020-02-29 RX ADMIN — ATORVASTATIN CALCIUM 40 MG: 40 TABLET, FILM COATED ORAL at 21:00

## 2020-02-29 RX ADMIN — CARVEDILOL 25 MG: 25 TABLET, FILM COATED ORAL at 17:41

## 2020-02-29 RX ADMIN — FAMOTIDINE 20 MG: 20 TABLET, FILM COATED ORAL at 11:03

## 2020-02-29 RX ADMIN — HYDROCHLOROTHIAZIDE 25 MG: 25 TABLET ORAL at 11:02

## 2020-02-29 RX ADMIN — Medication 1 DOSE: at 00:38

## 2020-02-29 RX ADMIN — PRASUGREL HYDROCHLORIDE 10 MG: 10 TABLET, FILM COATED ORAL at 11:03

## 2020-02-29 RX ADMIN — FLUTICASONE PROPIONATE 2 SPRAY: 50 SPRAY, METERED NASAL at 11:01

## 2020-02-29 RX ADMIN — FERROUS SULFATE TAB 325 MG (65 MG ELEMENTAL FE) 325 MG: 325 (65 FE) TAB at 17:41

## 2020-02-29 RX ADMIN — AMLODIPINE BESYLATE 10 MG: 10 TABLET ORAL at 11:03

## 2020-02-29 RX ADMIN — VITAMIN E CAP 400 UNIT 400 UNITS: 400 CAP at 11:02

## 2020-03-01 PROCEDURE — 99225 PR SBSQ OBSERVATION CARE/DAY 25 MINUTES: CPT | Performed by: INTERNAL MEDICINE

## 2020-03-01 PROCEDURE — G0378 HOSPITAL OBSERVATION PER HR: HCPCS

## 2020-03-01 RX ORDER — RANOLAZINE 500 MG/1
500 TABLET, EXTENDED RELEASE ORAL EVERY 12 HOURS SCHEDULED
Status: DISCONTINUED | OUTPATIENT
Start: 2020-03-01 | End: 2020-03-02 | Stop reason: HOSPADM

## 2020-03-01 RX ORDER — ISOSORBIDE MONONITRATE 120 MG/1
240 TABLET, EXTENDED RELEASE ORAL DAILY
Status: DISCONTINUED | OUTPATIENT
Start: 2020-03-02 | End: 2020-03-02 | Stop reason: HOSPADM

## 2020-03-01 RX ADMIN — VITAMIN E CAP 400 UNIT 400 UNITS: 400 CAP at 08:35

## 2020-03-01 RX ADMIN — HYDROCHLOROTHIAZIDE 25 MG: 25 TABLET ORAL at 08:34

## 2020-03-01 RX ADMIN — FLUTICASONE PROPIONATE 2 SPRAY: 50 SPRAY, METERED NASAL at 08:33

## 2020-03-01 RX ADMIN — RANOLAZINE 500 MG: 500 TABLET, FILM COATED, EXTENDED RELEASE ORAL at 20:19

## 2020-03-01 RX ADMIN — PRASUGREL HYDROCHLORIDE 10 MG: 10 TABLET, FILM COATED ORAL at 08:34

## 2020-03-01 RX ADMIN — FERROUS SULFATE TAB 325 MG (65 MG ELEMENTAL FE) 325 MG: 325 (65 FE) TAB at 08:33

## 2020-03-01 RX ADMIN — LISINOPRIL 40 MG: 20 TABLET ORAL at 08:33

## 2020-03-01 RX ADMIN — NITROGLYCERIN 0.4 MG: 0.4 TABLET SUBLINGUAL at 02:26

## 2020-03-01 RX ADMIN — RANOLAZINE 500 MG: 500 TABLET, FILM COATED, EXTENDED RELEASE ORAL at 08:37

## 2020-03-01 RX ADMIN — ATORVASTATIN CALCIUM 40 MG: 40 TABLET, FILM COATED ORAL at 20:19

## 2020-03-01 RX ADMIN — CARVEDILOL 25 MG: 25 TABLET, FILM COATED ORAL at 17:20

## 2020-03-01 RX ADMIN — CARVEDILOL 25 MG: 25 TABLET, FILM COATED ORAL at 08:34

## 2020-03-01 RX ADMIN — FERROUS SULFATE TAB 325 MG (65 MG ELEMENTAL FE) 325 MG: 325 (65 FE) TAB at 12:52

## 2020-03-01 RX ADMIN — ISOSORBIDE MONONITRATE 120 MG: 60 TABLET, EXTENDED RELEASE ORAL at 06:34

## 2020-03-01 RX ADMIN — NITROGLYCERIN 0.4 MG: 0.4 TABLET SUBLINGUAL at 02:35

## 2020-03-01 RX ADMIN — ALLOPURINOL 100 MG: 100 TABLET ORAL at 08:34

## 2020-03-01 RX ADMIN — HYDRALAZINE HYDROCHLORIDE 100 MG: 50 TABLET ORAL at 08:34

## 2020-03-01 RX ADMIN — FAMOTIDINE 20 MG: 20 TABLET, FILM COATED ORAL at 08:34

## 2020-03-01 RX ADMIN — AMLODIPINE BESYLATE 10 MG: 10 TABLET ORAL at 08:34

## 2020-03-01 RX ADMIN — ASPIRIN 81 MG: 81 TABLET ORAL at 08:34

## 2020-03-01 RX ADMIN — FERROUS SULFATE TAB 325 MG (65 MG ELEMENTAL FE) 325 MG: 325 (65 FE) TAB at 17:20

## 2020-03-01 RX ADMIN — HYDRALAZINE HYDROCHLORIDE 100 MG: 50 TABLET ORAL at 20:18

## 2020-03-02 VITALS
SYSTOLIC BLOOD PRESSURE: 132 MMHG | TEMPERATURE: 98.3 F | HEART RATE: 94 BPM | RESPIRATION RATE: 16 BRPM | HEIGHT: 71 IN | OXYGEN SATURATION: 95 % | WEIGHT: 146.3 LBS | DIASTOLIC BLOOD PRESSURE: 82 MMHG | BODY MASS INDEX: 20.48 KG/M2

## 2020-03-02 PROCEDURE — G0378 HOSPITAL OBSERVATION PER HR: HCPCS

## 2020-03-02 PROCEDURE — 99217 PR OBSERVATION CARE DISCHARGE MANAGEMENT: CPT | Performed by: INTERNAL MEDICINE

## 2020-03-02 RX ORDER — RANOLAZINE 500 MG/1
500 TABLET, EXTENDED RELEASE ORAL EVERY 12 HOURS SCHEDULED
Qty: 60 TABLET | Refills: 11 | Status: SHIPPED | OUTPATIENT
Start: 2020-03-02

## 2020-03-02 RX ORDER — ISOSORBIDE MONONITRATE 120 MG/1
120 TABLET, EXTENDED RELEASE ORAL DAILY
Qty: 90 TABLET | Refills: 3 | Status: SHIPPED | OUTPATIENT
Start: 2020-03-02

## 2020-03-02 RX ADMIN — AMLODIPINE BESYLATE 10 MG: 10 TABLET ORAL at 09:23

## 2020-03-02 RX ADMIN — ISOSORBIDE MONONITRATE 240 MG: 120 TABLET ORAL at 09:22

## 2020-03-02 RX ADMIN — SODIUM CHLORIDE, PRESERVATIVE FREE 10 ML: 5 INJECTION INTRAVENOUS at 09:25

## 2020-03-02 RX ADMIN — HYDROCHLOROTHIAZIDE 25 MG: 25 TABLET ORAL at 09:24

## 2020-03-02 RX ADMIN — ASPIRIN 81 MG: 81 TABLET ORAL at 09:23

## 2020-03-02 RX ADMIN — RANOLAZINE 500 MG: 500 TABLET, FILM COATED, EXTENDED RELEASE ORAL at 09:23

## 2020-03-02 RX ADMIN — HYDRALAZINE HYDROCHLORIDE 100 MG: 50 TABLET ORAL at 09:23

## 2020-03-02 RX ADMIN — LISINOPRIL 40 MG: 20 TABLET ORAL at 09:23

## 2020-03-02 RX ADMIN — VITAMIN E CAP 400 UNIT 400 UNITS: 400 CAP at 09:24

## 2020-03-02 RX ADMIN — ALLOPURINOL 100 MG: 100 TABLET ORAL at 09:24

## 2020-03-02 RX ADMIN — CARVEDILOL 25 MG: 25 TABLET, FILM COATED ORAL at 09:24

## 2020-03-02 RX ADMIN — FAMOTIDINE 20 MG: 20 TABLET, FILM COATED ORAL at 09:23

## 2020-03-02 RX ADMIN — FERROUS SULFATE TAB 325 MG (65 MG ELEMENTAL FE) 325 MG: 325 (65 FE) TAB at 09:25

## 2020-03-02 RX ADMIN — PRASUGREL HYDROCHLORIDE 10 MG: 10 TABLET, FILM COATED ORAL at 09:22

## 2021-01-01 ENCOUNTER — HOSPITAL ENCOUNTER (OUTPATIENT)
Dept: CARDIOLOGY | Facility: HOSPITAL | Age: 76
Discharge: HOME | End: 2021-10-27

## 2021-01-01 ENCOUNTER — APPOINTMENT (OUTPATIENT)
Dept: CARDIOLOGY | Facility: HOSPITAL | Age: 76
End: 2021-01-01

## 2021-01-01 ENCOUNTER — OFFICE VISIT (OUTPATIENT)
Dept: CARDIOLOGY | Facility: CLINIC | Age: 76
End: 2021-01-01

## 2021-01-01 ENCOUNTER — HOSPITAL ENCOUNTER (OUTPATIENT)
Dept: CARDIOLOGY | Facility: HOSPITAL | Age: 76
Discharge: HOME | End: 2021-10-12
Admitting: INTERNAL MEDICINE

## 2021-01-01 VITALS
OXYGEN SATURATION: 95 % | WEIGHT: 149 LBS | SYSTOLIC BLOOD PRESSURE: 165 MMHG | DIASTOLIC BLOOD PRESSURE: 80 MMHG | HEIGHT: 71 IN | BODY MASS INDEX: 20.86 KG/M2 | HEART RATE: 88 BPM

## 2021-01-01 VITALS
HEIGHT: 70 IN | OXYGEN SATURATION: 98 % | DIASTOLIC BLOOD PRESSURE: 60 MMHG | SYSTOLIC BLOOD PRESSURE: 148 MMHG | HEART RATE: 141 BPM | WEIGHT: 141 LBS | BODY MASS INDEX: 20.19 KG/M2

## 2021-01-01 VITALS
HEIGHT: 70 IN | SYSTOLIC BLOOD PRESSURE: 92 MMHG | WEIGHT: 144 LBS | DIASTOLIC BLOOD PRESSURE: 58 MMHG | OXYGEN SATURATION: 98 % | HEART RATE: 60 BPM | BODY MASS INDEX: 20.62 KG/M2

## 2021-01-01 VITALS
WEIGHT: 142 LBS | HEART RATE: 45 BPM | DIASTOLIC BLOOD PRESSURE: 52 MMHG | SYSTOLIC BLOOD PRESSURE: 98 MMHG | BODY MASS INDEX: 20.33 KG/M2 | HEIGHT: 70 IN | OXYGEN SATURATION: 99 %

## 2021-01-01 VITALS
HEIGHT: 70 IN | BODY MASS INDEX: 20.33 KG/M2 | SYSTOLIC BLOOD PRESSURE: 98 MMHG | WEIGHT: 141.98 LBS | DIASTOLIC BLOOD PRESSURE: 52 MMHG

## 2021-01-01 VITALS
HEIGHT: 71 IN | WEIGHT: 142 LBS | HEART RATE: 100 BPM | DIASTOLIC BLOOD PRESSURE: 103 MMHG | BODY MASS INDEX: 19.88 KG/M2 | SYSTOLIC BLOOD PRESSURE: 165 MMHG

## 2021-01-01 DIAGNOSIS — I50.22 CHRONIC SYSTOLIC CONGESTIVE HEART FAILURE (HCC): Chronic | ICD-10-CM

## 2021-01-01 DIAGNOSIS — N18.9 CHRONIC KIDNEY DISEASE, UNSPECIFIED CKD STAGE: ICD-10-CM

## 2021-01-01 DIAGNOSIS — F17.200 SMOKER: ICD-10-CM

## 2021-01-01 DIAGNOSIS — I47.1 MULTIFOCAL ATRIAL TACHYCARDIA (HCC): ICD-10-CM

## 2021-01-01 DIAGNOSIS — Z95.5 STENTED CORONARY ARTERY: ICD-10-CM

## 2021-01-01 DIAGNOSIS — N18.9 CHRONIC KIDNEY DISEASE, UNSPECIFIED CKD STAGE: Primary | ICD-10-CM

## 2021-01-01 DIAGNOSIS — I25.10 CORONARY ARTERY DISEASE INVOLVING NATIVE CORONARY ARTERY OF NATIVE HEART WITHOUT ANGINA PECTORIS: Chronic | ICD-10-CM

## 2021-01-01 DIAGNOSIS — I10 PRIMARY HYPERTENSION: Chronic | ICD-10-CM

## 2021-01-01 DIAGNOSIS — Z99.2 STAGE 5 CHRONIC KIDNEY DISEASE ON CHRONIC DIALYSIS (HCC): Primary | Chronic | ICD-10-CM

## 2021-01-01 DIAGNOSIS — I10 ESSENTIAL HYPERTENSION: ICD-10-CM

## 2021-01-01 DIAGNOSIS — Z99.2 STAGE 5 CHRONIC KIDNEY DISEASE ON CHRONIC DIALYSIS (HCC): Chronic | ICD-10-CM

## 2021-01-01 DIAGNOSIS — I25.10 CORONARY ARTERY DISEASE INVOLVING NATIVE CORONARY ARTERY OF NATIVE HEART WITHOUT ANGINA PECTORIS: ICD-10-CM

## 2021-01-01 DIAGNOSIS — R06.09 DOE (DYSPNEA ON EXERTION): ICD-10-CM

## 2021-01-01 DIAGNOSIS — N18.6 STAGE 5 CHRONIC KIDNEY DISEASE ON CHRONIC DIALYSIS (HCC): Primary | Chronic | ICD-10-CM

## 2021-01-01 DIAGNOSIS — I50.22 CHRONIC SYSTOLIC CONGESTIVE HEART FAILURE (HCC): Primary | Chronic | ICD-10-CM

## 2021-01-01 DIAGNOSIS — N18.6 TYPE 2 DIABETES MELLITUS WITH CHRONIC KIDNEY DISEASE ON CHRONIC DIALYSIS, WITHOUT LONG-TERM CURRENT USE OF INSULIN (HCC): Chronic | ICD-10-CM

## 2021-01-01 DIAGNOSIS — N18.6 STAGE 5 CHRONIC KIDNEY DISEASE ON CHRONIC DIALYSIS (HCC): Chronic | ICD-10-CM

## 2021-01-01 DIAGNOSIS — Z95.5 STENTED CORONARY ARTERY: Chronic | ICD-10-CM

## 2021-01-01 DIAGNOSIS — E78.5 HYPERLIPIDEMIA LDL GOAL <70: Chronic | ICD-10-CM

## 2021-01-01 DIAGNOSIS — G47.33 OBSTRUCTIVE SLEEP APNEA SYNDROME: Chronic | ICD-10-CM

## 2021-01-01 DIAGNOSIS — E11.22 TYPE 2 DIABETES MELLITUS WITH CHRONIC KIDNEY DISEASE ON CHRONIC DIALYSIS, WITHOUT LONG-TERM CURRENT USE OF INSULIN (HCC): Chronic | ICD-10-CM

## 2021-01-01 DIAGNOSIS — Z99.2 TYPE 2 DIABETES MELLITUS WITH CHRONIC KIDNEY DISEASE ON CHRONIC DIALYSIS, WITHOUT LONG-TERM CURRENT USE OF INSULIN (HCC): Chronic | ICD-10-CM

## 2021-01-01 DIAGNOSIS — Z95.5 STENTED CORONARY ARTERY: Primary | ICD-10-CM

## 2021-01-01 LAB
BH CV ECHO MEAS - AO MAX PG (FULL): 5.6 MMHG
BH CV ECHO MEAS - AO MAX PG: 7 MMHG
BH CV ECHO MEAS - AO MEAN PG (FULL): 4 MMHG
BH CV ECHO MEAS - AO MEAN PG: 5 MMHG
BH CV ECHO MEAS - AO ROOT AREA (BSA CORRECTED): 1.6
BH CV ECHO MEAS - AO ROOT AREA: 6.6 CM^2
BH CV ECHO MEAS - AO ROOT DIAM: 2.9 CM
BH CV ECHO MEAS - AO V2 MAX: 132 CM/SEC
BH CV ECHO MEAS - AO V2 MEAN: 111 CM/SEC
BH CV ECHO MEAS - AO V2 VTI: 24.9 CM
BH CV ECHO MEAS - AVA(I,A): 1.2 CM^2
BH CV ECHO MEAS - AVA(I,D): 1.2 CM^2
BH CV ECHO MEAS - AVA(V,A): 1.3 CM^2
BH CV ECHO MEAS - AVA(V,D): 1.3 CM^2
BH CV ECHO MEAS - BSA(HAYCOCK): 1.8 M^2
BH CV ECHO MEAS - BSA: 1.8 M^2
BH CV ECHO MEAS - BZI_BMI: 20.4 KILOGRAMS/M^2
BH CV ECHO MEAS - BZI_METRIC_HEIGHT: 177.8 CM
BH CV ECHO MEAS - BZI_METRIC_WEIGHT: 64.4 KG
BH CV ECHO MEAS - EDV(MOD-SP4): 91.6 ML
BH CV ECHO MEAS - EF(MOD-SP4): 40.5 %
BH CV ECHO MEAS - ESV(MOD-SP4): 54.5 ML
BH CV ECHO MEAS - LA DIMENSION: 3.1 CM
BH CV ECHO MEAS - LA/AO: 1.1
BH CV ECHO MEAS - LAT PEAK E' VEL: 4.8 CM/SEC
BH CV ECHO MEAS - LV DIASTOLIC VOL/BSA (35-75): 50.8 ML/M^2
BH CV ECHO MEAS - LV MAX PG: 1.4 MMHG
BH CV ECHO MEAS - LV MEAN PG: 1 MMHG
BH CV ECHO MEAS - LV SYSTOLIC VOL/BSA (12-30): 30.2 ML/M^2
BH CV ECHO MEAS - LV V1 MAX: 58.4 CM/SEC
BH CV ECHO MEAS - LV V1 MEAN: 39.8 CM/SEC
BH CV ECHO MEAS - LV V1 VTI: 10.3 CM
BH CV ECHO MEAS - LVLD AP4: 6.9 CM
BH CV ECHO MEAS - LVLS AP4: 5.6 CM
BH CV ECHO MEAS - LVOT AREA (M): 2.8 CM^2
BH CV ECHO MEAS - LVOT AREA: 2.8 CM^2
BH CV ECHO MEAS - LVOT DIAM: 1.9 CM
BH CV ECHO MEAS - MED PEAK E' VEL: 9.8 CM/SEC
BH CV ECHO MEAS - MV DEC SLOPE: 485 CM/SEC^2
BH CV ECHO MEAS - MV DEC TIME: 0.18 SEC
BH CV ECHO MEAS - MV E MAX VEL: 87 CM/SEC
BH CV ECHO MEAS - SI(AO): 91.1 ML/M^2
BH CV ECHO MEAS - SI(LVOT): 16.2 ML/M^2
BH CV ECHO MEAS - SI(MOD-SP4): 20.6 ML/M^2
BH CV ECHO MEAS - SV(AO): 164.5 ML
BH CV ECHO MEAS - SV(LVOT): 29.2 ML
BH CV ECHO MEAS - SV(MOD-SP4): 37.1 ML
BH CV ECHO MEASUREMENTS AVERAGE E/E' RATIO: 11.92
BH CV REST NUCLEAR ISOTOPE DOSE: 11 MCI
BH CV STRESS BP STAGE 1: NORMAL
BH CV STRESS COMMENTS STAGE 1: NORMAL
BH CV STRESS DOSE REGADENOSON STAGE 1: 0.4
BH CV STRESS DURATION MIN STAGE 1: 0
BH CV STRESS DURATION SEC STAGE 1: 10
BH CV STRESS HR STAGE 1: 122
BH CV STRESS NUCLEAR ISOTOPE DOSE: 34.5 MCI
BH CV STRESS PROTOCOL 1: NORMAL
BH CV STRESS RECOVERY BP: NORMAL MMHG
BH CV STRESS RECOVERY HR: 115 BPM
BH CV STRESS STAGE 1: 1
LEFT ATRIUM VOLUME INDEX: 9.4 ML/M2
LEFT ATRIUM VOLUME: 16.9 CM3
LV EF NUC BP: 31 %
MAXIMAL PREDICTED HEART RATE: 145 BPM
MAXIMAL PREDICTED HEART RATE: 145 BPM
PERCENT MAX PREDICTED HR: 84.14 %
STRESS BASELINE BP: NORMAL MMHG
STRESS BASELINE HR: 100 BPM
STRESS PERCENT HR: 99 %
STRESS POST EXERCISE DUR SEC: 10 SEC
STRESS POST PEAK BP: NORMAL MMHG
STRESS POST PEAK HR: 122 BPM
STRESS TARGET HR: 123 BPM
STRESS TARGET HR: 123 BPM

## 2021-01-01 PROCEDURE — A9500 TC99M SESTAMIBI: HCPCS | Performed by: INTERNAL MEDICINE

## 2021-01-01 PROCEDURE — 93000 ELECTROCARDIOGRAM COMPLETE: CPT | Performed by: NURSE PRACTITIONER

## 2021-01-01 PROCEDURE — 25010000002 PERFLUTREN 6.52 MG/ML SUSPENSION: Performed by: INTERNAL MEDICINE

## 2021-01-01 PROCEDURE — 93306 TTE W/DOPPLER COMPLETE: CPT

## 2021-01-01 PROCEDURE — 99214 OFFICE O/P EST MOD 30 MIN: CPT | Performed by: NURSE PRACTITIONER

## 2021-01-01 PROCEDURE — 93018 CV STRESS TEST I&R ONLY: CPT | Performed by: INTERNAL MEDICINE

## 2021-01-01 PROCEDURE — 78452 HT MUSCLE IMAGE SPECT MULT: CPT

## 2021-01-01 PROCEDURE — 0 TECHNETIUM SESTAMIBI: Performed by: INTERNAL MEDICINE

## 2021-01-01 PROCEDURE — 99214 OFFICE O/P EST MOD 30 MIN: CPT | Performed by: INTERNAL MEDICINE

## 2021-01-01 PROCEDURE — 93306 TTE W/DOPPLER COMPLETE: CPT | Performed by: INTERNAL MEDICINE

## 2021-01-01 PROCEDURE — 25010000002 REGADENOSON 0.4 MG/5ML SOLUTION: Performed by: INTERNAL MEDICINE

## 2021-01-01 PROCEDURE — 93000 ELECTROCARDIOGRAM COMPLETE: CPT | Performed by: INTERNAL MEDICINE

## 2021-01-01 PROCEDURE — 78452 HT MUSCLE IMAGE SPECT MULT: CPT | Performed by: INTERNAL MEDICINE

## 2021-01-01 PROCEDURE — 93017 CV STRESS TEST TRACING ONLY: CPT

## 2021-01-01 RX ORDER — DILTIAZEM HYDROCHLORIDE 120 MG/1
120 CAPSULE, COATED, EXTENDED RELEASE ORAL DAILY
Qty: 90 CAPSULE | Refills: 3 | Status: SHIPPED | OUTPATIENT
Start: 2021-01-01

## 2021-01-01 RX ORDER — LISINOPRIL 5 MG/1
40 TABLET ORAL DAILY
Qty: 90 TABLET | Refills: 3 | Status: SHIPPED | OUTPATIENT
Start: 2021-01-01

## 2021-01-01 RX ORDER — CLONIDINE 0.1 MG/24H
1 PATCH, EXTENDED RELEASE TRANSDERMAL WEEKLY
Qty: 12 PATCH | Refills: 3 | Status: SHIPPED | OUTPATIENT
Start: 2021-01-01 | End: 2021-01-01 | Stop reason: SDUPTHER

## 2021-01-01 RX ORDER — CARVEDILOL 3.12 MG/1
3.25 TABLET ORAL 2 TIMES DAILY WITH MEALS
Qty: 180 TABLET | Refills: 3 | Status: SHIPPED | OUTPATIENT
Start: 2021-01-01

## 2021-01-01 RX ORDER — CLONIDINE 0.1 MG/24H
1 PATCH, EXTENDED RELEASE TRANSDERMAL WEEKLY
Qty: 12 PATCH | Refills: 3 | Status: SHIPPED | OUTPATIENT
Start: 2021-01-01

## 2021-01-01 RX ADMIN — PERFLUTREN 8.48 MG: 6.52 INJECTION, SUSPENSION INTRAVENOUS at 15:39

## 2021-01-01 RX ADMIN — TECHNETIUM TC 99M SESTAMIBI 1 DOSE: 1 INJECTION INTRAVENOUS at 09:52

## 2021-01-01 RX ADMIN — TECHNETIUM TC 99M SESTAMIBI 1 DOSE: 1 INJECTION INTRAVENOUS at 11:57

## 2021-01-01 RX ADMIN — REGADENOSON 0.4 MG: 0.08 INJECTION, SOLUTION INTRAVENOUS at 11:25

## 2021-09-01 PROBLEM — I20.0 UNSTABLE ANGINA PECTORIS: Status: RESOLVED | Noted: 2019-04-08 | Resolved: 2021-01-01

## 2021-09-01 PROBLEM — R07.9 CHEST PAIN: Status: RESOLVED | Noted: 2019-04-08 | Resolved: 2021-01-01

## 2021-11-30 PROBLEM — I47.1 MULTIFOCAL ATRIAL TACHYCARDIA: Status: ACTIVE | Noted: 2021-01-01

## 2021-11-30 PROBLEM — I50.22 CHRONIC SYSTOLIC CONGESTIVE HEART FAILURE (HCC): Chronic | Status: ACTIVE | Noted: 2021-01-01

## 2021-11-30 PROBLEM — Z95.5 STENTED CORONARY ARTERY: Chronic | Status: ACTIVE | Noted: 2017-08-24

## 2021-11-30 PROBLEM — N18.9 CKD (CHRONIC KIDNEY DISEASE): Chronic | Status: ACTIVE | Noted: 2017-01-11

## 2021-11-30 PROBLEM — I10 PRIMARY HYPERTENSION: Chronic | Status: ACTIVE | Noted: 2017-01-11

## 2021-11-30 PROBLEM — I50.22 CHRONIC SYSTOLIC CONGESTIVE HEART FAILURE (HCC): Status: ACTIVE | Noted: 2021-01-01

## 2021-12-17 ENCOUNTER — OFFICE VISIT (OUTPATIENT)
Dept: SURGERY | Age: 76
End: 2021-12-17
Payer: OTHER GOVERNMENT

## 2021-12-17 VITALS
BODY MASS INDEX: 20.33 KG/M2 | WEIGHT: 145.2 LBS | SYSTOLIC BLOOD PRESSURE: 126 MMHG | DIASTOLIC BLOOD PRESSURE: 68 MMHG | HEIGHT: 71 IN | TEMPERATURE: 97.3 F

## 2021-12-17 DIAGNOSIS — N18.6 ESRD ON DIALYSIS (HCC): Primary | ICD-10-CM

## 2021-12-17 DIAGNOSIS — Z99.2 ESRD ON DIALYSIS (HCC): Primary | ICD-10-CM

## 2021-12-17 PROCEDURE — 99203 OFFICE O/P NEW LOW 30 MIN: CPT | Performed by: SURGERY

## 2021-12-17 RX ORDER — DILTIAZEM HYDROCHLORIDE 120 MG/1
120 CAPSULE, EXTENDED RELEASE ORAL DAILY
Status: ON HOLD | COMMUNITY
End: 2022-01-05

## 2021-12-17 NOTE — PROGRESS NOTES
Mr. Christal Mcfarland is a 77year old male who presents with a complaint of ESRD on dialysis. He is now interested in starting peritoneal dialysis. The patient reports that he has been on dialysis for about 4 months. He goes Tuesday, Thursday, Saturday to the [facility] outpatient center. She uses a permcath. Her has had a cardiac stent in 2019, he sees Dr. Lamberto Montana for cardiology. The patient also complains of an abdominal wall hernia. He states that it has been there for quite some time. There can be a knot there that gets smaller.      Past Medical History:   Diagnosis Date    Chronic kidney disease     Heart disease     Hyperlipidemia     Hypertension      Past Surgical History:   Procedure Laterality Date    CORONARY ANGIOPLASTY WITH STENT PLACEMENT      UMBILICAL HERNIA REPAIR       Current Outpatient Medications   Medication Sig Dispense Refill    dilTIAZem (CARDIZEM 12 HR) 120 MG extended release capsule Take 120 mg by mouth 2 times daily      CLONIDINE TD Place onto the skin      allopurinol (ZYLOPRIM) 100 MG tablet Take 2 tablets by mouth      aspirin 81 MG chewable tablet Take 1 tablet by mouth      carvedilol (COREG) 25 MG tablet Take 1 tablet by mouth      cloNIDine (CATAPRES) 0.1 MG tablet Take 1 tablet by mouth      isosorbide mononitrate (IMDUR) 120 MG extended release tablet Take 1 tablet by mouth      lisinopril (PRINIVIL;ZESTRIL) 40 MG tablet Take 1 tablet by mouth      ranolazine (RANEXA) 500 MG extended release tablet Take 1 tablet by mouth      rosuvastatin (CRESTOR) 40 MG tablet Take 0.5 tablets by mouth      DULoxetine HCl 60 MG CSDR Take by mouth      calcitRIOL (ROCALTROL) 0.5 MCG capsule Take by mouth      sodium bicarbonate 325 MG tablet Take 1 tablet by mouth      clopidogrel (PLAVIX) 75 MG tablet Take 1 tablet by mouth      ferrous sulfate (IRON 325) 325 (65 Fe) MG tablet Take 1 tablet by mouth      ascorbic acid (VITAMIN C) 500 MG tablet Take 1 tablet by mouth      Calcium-Mag-Vit C-Vit D 595-29- PACK Take 1 tablet by mouth daily      vitamin D 25 MCG (1000 UT) CAPS Take by mouth      vitamin E 1000 units capsule Take 1,000 Units by mouth daily      iron sucrose (VENOFER) 20 MG/ML injection 100 mg      amLODIPine (NORVASC) 10 MG tablet Take 1 tablet by mouth      hydrALAZINE (APRESOLINE) 100 MG tablet Take 1 tablet by mouth      hydroCHLOROthiazide (HYDRODIURIL) 25 MG tablet Take 25 mg by mouth daily      nitroGLYCERIN (NITROSTAT) 0.4 MG SL tablet Place 1 tablet under the tongue (Patient not taking: Reported on 12/17/2021)      meclizine (ANTIVERT) 25 MG CHEW Take 1 tablet by mouth (Patient not taking: Reported on 12/17/2021)      raNITIdine (ZANTAC) 150 MG tablet Take 150 mg by mouth 2 times daily (Patient not taking: Reported on 12/17/2021)      Methoxy PEG-Epoetin Beta (MIRCERA IJ) 75 mcg (Patient not taking: Reported on 12/17/2021)      Tuberculin PPD (TUBERSOL ID) Inject 0.1 mLs into the skin (Patient not taking: Reported on 12/17/2021)      Ascorbic Acid  MG CPCR Take 500 mg by mouth 2 times daily       No current facility-administered medications for this visit. Allergies: Patient has no known allergies. History reviewed. No pertinent family history. Social History     Tobacco Use    Smoking status: Former Smoker    Smokeless tobacco: Never Used   Substance Use Topics    Alcohol use: Never       Review of Systems   Constitutional: Positive for fatigue. Negative for activity change and appetite change. HENT: Positive for nosebleeds and rhinorrhea. Negative for congestion. Eyes: Negative for pain, redness and visual disturbance. Respiratory: Positive for apnea and shortness of breath. Negative for cough. Cardiovascular: Positive for chest pain and palpitations. Negative for leg swelling. Gastrointestinal: Positive for constipation. Negative for abdominal distention, diarrhea and vomiting.    Endocrine: Negative for polydipsia and polyphagia. Genitourinary: Negative for dysuria, frequency and hematuria. Musculoskeletal: Positive for back pain. Negative for arthralgias and gait problem. Skin: Negative for rash and wound. Neurological: Positive for dizziness, weakness and light-headedness. Hematological: Bruises/bleeds easily. Psychiatric/Behavioral: Positive for sleep disturbance. Negative for confusion. The patient is nervous/anxious. Physical Exam  Vitals reviewed. Constitutional:       General: He is not in acute distress. Appearance: Normal appearance. HENT:      Head: Normocephalic and atraumatic. Nose:      Comments: Wearing mask  Eyes:      General: No scleral icterus. Pupils: Pupils are equal, round, and reactive to light. Cardiovascular:      Rate and Rhythm: Normal rate and regular rhythm. Heart sounds: No murmur heard. Pulmonary:      Effort: Pulmonary effort is normal. No respiratory distress. Breath sounds: No wheezing. Abdominal:      General: There is no distension. Palpations: Abdomen is soft. Tenderness: There is no abdominal tenderness. There is no guarding. Hernia: A hernia (reducible epigastric hernia, about 1.5 cm) is present. Musculoskeletal:         General: No swelling or deformity. Normal range of motion. Cervical back: Neck supple. No rigidity. Lymphadenopathy:      Cervical: No cervical adenopathy. Skin:     General: Skin is warm and dry. Coloration: Skin is not jaundiced. Neurological:      General: No focal deficit present. Mental Status: He is alert. Mental status is at baseline.    Psychiatric:         Mood and Affect: Mood normal.         Behavior: Behavior normal.           Assessment and plan:  77year old male with ESRD and abdominal hernia  I discussed with the patient the risks and benefits of laparoscopic PD catheter placement, including but not limited to, bleeding, infection, injury to surrounding structures, and need for open surgery. I discussed that, at the time of laparoscopy, can examine the area of the hernia. If it is small as on palpation, can plan primary laparoscopic repair and placement of PD catheter, with leaving the PD catheter buried for 6 weeks to give the hernia time to heal. If, at the time of surgery, the hernia is larger or more complex than palpated, will plan for laparoscopic hernia repair with mesh, and placement of the PD catheter after he has healed up from the hernia repair. The patient expressed understanding and would like to proceed with surgery.     Twlya Hall MD  12/17/2021  11:33 AM

## 2021-12-17 NOTE — LETTER
Preop Orders:     Patient: Ganga Shepherd  : 1945    Hospital: [facility]    Admitting Physician:  Dr. Lenny Phoenix    Diagnosis: epigastric hernia and ESRD on dialysis    Procedure: laparoscopic primary epigastric hernia repair with laparoscopic PD catheter placement, buried    Time: 1.5 hours    Anesthesia: General    Admission: Outpatient     Date: to be scheduled-- will have to hold plavix for 7 days    Labs: per anesthesia     Special Instructions:  none    Cardiac Clearance:  will contact Alysha's office for holding plavix    Special Equipment:  none    Pre-Op Meds:  ancef    Latex Allergy: no    Beta Blocker: yes - cavedilol    Medication Instructions: hold plavix for 7 days preop, okay to continue taking aspirin    Post op visit: 2 weeks post op      Electronically signed by Beau Mcdonough MD   On 21   @ 11:30 AM Plastic Surgeon Procedure Text (B): After obtaining clear surgical margins the patient was sent to plastics for surgical repair.  The patient understands they will receive post-surgical care and follow-up from the referring physician's office.

## 2021-12-20 ASSESSMENT — ENCOUNTER SYMPTOMS
EYE PAIN: 0
SHORTNESS OF BREATH: 1
CONSTIPATION: 1
BACK PAIN: 1
RHINORRHEA: 1
DIARRHEA: 0
VOMITING: 0
ABDOMINAL DISTENTION: 0
EYE REDNESS: 0
COUGH: 0
APNEA: 1

## 2021-12-21 ENCOUNTER — TELEPHONE (OUTPATIENT)
Dept: SURGERY | Age: 76
End: 2021-12-21

## 2021-12-21 NOTE — TELEPHONE ENCOUNTER
Stephanie Schaffer requests that clinic return their call. The best time to reach him is Anytime. Patient is having hernia repair on 12/29/21 and needs to know when to or if he is to stop taking Plavix. Thank you.

## 2021-12-21 NOTE — TELEPHONE ENCOUNTER
I think I sent a note yesterday for the message to be sent to Dr. Chelsea Ross office to make sure about holding plavix

## 2021-12-23 NOTE — TELEPHONE ENCOUNTER
I s/w Arley Martinez at Dr Simone Alvarado office & she said that Dr Simone Alvarado said patient doesn't take Plavix, he takes Effient 10mg daily. He said for patient to hold Effient no longer than 7 days. Also do not stop taking aspirin. No lovenox required. Forwarding to Dr Destin Rueda to inform him of this & see what he wants me to tell this patient to do.

## 2021-12-23 NOTE — TELEPHONE ENCOUNTER
I s/w Teetee & informed her of what Dr Giuliana George said (noted below). She said ok he will do that.

## 2021-12-28 ENCOUNTER — HOSPITAL ENCOUNTER (OUTPATIENT)
Dept: PREADMISSION TESTING | Age: 76
Discharge: HOME | End: 2022-01-01
Payer: OTHER GOVERNMENT

## 2021-12-28 LAB
ANION GAP SERPL CALCULATED.3IONS-SCNC: 13 MMOL/L (ref 7–19)
BASOPHILS ABSOLUTE: 0 K/UL (ref 0–0.2)
BASOPHILS RELATIVE PERCENT: 0.3 % (ref 0–1)
BUN BLDV-MCNC: 66 MG/DL (ref 8–23)
CALCIUM SERPL-MCNC: 9.9 MG/DL (ref 8.8–10.2)
CHLORIDE BLD-SCNC: 104 MMOL/L (ref 98–111)
CO2: 23 MMOL/L (ref 22–29)
CREAT SERPL-MCNC: 7.4 MG/DL (ref 0.5–1.2)
EOSINOPHILS ABSOLUTE: 0.2 K/UL (ref 0–0.6)
EOSINOPHILS RELATIVE PERCENT: 3.3 % (ref 0–5)
GFR AFRICAN AMERICAN: 9
GFR NON-AFRICAN AMERICAN: 7
GLUCOSE BLD-MCNC: 91 MG/DL (ref 74–109)
HCT VFR BLD CALC: 32.5 % (ref 42–52)
HEMOGLOBIN: 10.4 G/DL (ref 14–18)
IMMATURE GRANULOCYTES #: 0 K/UL
LYMPHOCYTES ABSOLUTE: 1.2 K/UL (ref 1.1–4.5)
LYMPHOCYTES RELATIVE PERCENT: 17.6 % (ref 20–40)
MCH RBC QN AUTO: 30.9 PG (ref 27–31)
MCHC RBC AUTO-ENTMCNC: 32 G/DL (ref 33–37)
MCV RBC AUTO: 96.4 FL (ref 80–94)
MONOCYTES ABSOLUTE: 0.7 K/UL (ref 0–0.9)
MONOCYTES RELATIVE PERCENT: 9.8 % (ref 0–10)
NEUTROPHILS ABSOLUTE: 4.6 K/UL (ref 1.5–7.5)
NEUTROPHILS RELATIVE PERCENT: 68.7 % (ref 50–65)
PDW BLD-RTO: 15.5 % (ref 11.5–14.5)
PLATELET # BLD: 240 K/UL (ref 130–400)
PMV BLD AUTO: 10.3 FL (ref 9.4–12.4)
POTASSIUM SERPL-SCNC: 4.7 MMOL/L (ref 3.5–5)
RBC # BLD: 3.37 M/UL (ref 4.7–6.1)
SODIUM BLD-SCNC: 140 MMOL/L (ref 136–145)
WBC # BLD: 6.8 K/UL (ref 4.8–10.8)

## 2021-12-28 PROCEDURE — 80048 BASIC METABOLIC PNL TOTAL CA: CPT

## 2021-12-28 PROCEDURE — 87641 MR-STAPH DNA AMP PROBE: CPT

## 2021-12-28 PROCEDURE — 85025 COMPLETE CBC W/AUTO DIFF WBC: CPT

## 2021-12-28 RX ORDER — CARVEDILOL 3.12 MG/1
6.25 TABLET ORAL 2 TIMES DAILY WITH MEALS
COMMUNITY

## 2021-12-28 RX ORDER — M-VIT,TX,IRON,MINS/CALC/FOLIC 27MG-0.4MG
1 TABLET ORAL DAILY
COMMUNITY

## 2021-12-29 ENCOUNTER — HOSPITAL ENCOUNTER (OUTPATIENT)
Age: 76
Setting detail: OUTPATIENT SURGERY
Discharge: HOME | End: 2021-12-29
Attending: SURGERY | Admitting: SURGERY
Payer: OTHER GOVERNMENT

## 2021-12-29 ENCOUNTER — ANESTHESIA EVENT (OUTPATIENT)
Dept: OPERATING ROOM | Age: 76
End: 2021-12-29
Payer: OTHER GOVERNMENT

## 2021-12-29 ENCOUNTER — ANESTHESIA (OUTPATIENT)
Dept: OPERATING ROOM | Age: 76
End: 2021-12-29
Payer: OTHER GOVERNMENT

## 2021-12-29 VITALS
HEIGHT: 71 IN | BODY MASS INDEX: 20.44 KG/M2 | DIASTOLIC BLOOD PRESSURE: 88 MMHG | HEART RATE: 92 BPM | WEIGHT: 146 LBS | RESPIRATION RATE: 18 BRPM | TEMPERATURE: 97.7 F | OXYGEN SATURATION: 96 % | SYSTOLIC BLOOD PRESSURE: 145 MMHG

## 2021-12-29 VITALS — DIASTOLIC BLOOD PRESSURE: 107 MMHG | SYSTOLIC BLOOD PRESSURE: 181 MMHG | OXYGEN SATURATION: 99 % | TEMPERATURE: 98.1 F

## 2021-12-29 DIAGNOSIS — K46.9 RECURRENT HERNIA: Primary | ICD-10-CM

## 2021-12-29 LAB — MRSA SCREEN RT-PCR: NOT DETECTED

## 2021-12-29 PROCEDURE — 49656 PR LAP, RECURRENT INCISIONAL HERNIA REPAIR,REDUCIBLE: CPT | Performed by: SURGERY

## 2021-12-29 PROCEDURE — 2709999900 HC NON-CHARGEABLE SUPPLY: Performed by: SURGERY

## 2021-12-29 PROCEDURE — C1781 MESH (IMPLANTABLE): HCPCS | Performed by: SURGERY

## 2021-12-29 PROCEDURE — 7100000000 HC PACU RECOVERY - FIRST 15 MIN: Performed by: SURGERY

## 2021-12-29 PROCEDURE — 7100000010 HC PHASE II RECOVERY - FIRST 15 MIN: Performed by: SURGERY

## 2021-12-29 PROCEDURE — 6360000002 HC RX W HCPCS: Performed by: NURSE ANESTHETIST, CERTIFIED REGISTERED

## 2021-12-29 PROCEDURE — 6370000000 HC RX 637 (ALT 250 FOR IP): Performed by: SURGERY

## 2021-12-29 PROCEDURE — 7100000001 HC PACU RECOVERY - ADDTL 15 MIN: Performed by: SURGERY

## 2021-12-29 PROCEDURE — 2500000003 HC RX 250 WO HCPCS: Performed by: NURSE ANESTHETIST, CERTIFIED REGISTERED

## 2021-12-29 PROCEDURE — 3600000009 HC SURGERY ROBOT BASE: Performed by: SURGERY

## 2021-12-29 PROCEDURE — 6360000002 HC RX W HCPCS: Performed by: ANESTHESIOLOGY

## 2021-12-29 PROCEDURE — 2500000003 HC RX 250 WO HCPCS: Performed by: ANESTHESIOLOGY

## 2021-12-29 PROCEDURE — C1750 CATH, HEMODIALYSIS,LONG-TERM: HCPCS | Performed by: SURGERY

## 2021-12-29 PROCEDURE — S2900 ROBOTIC SURGICAL SYSTEM: HCPCS | Performed by: SURGERY

## 2021-12-29 PROCEDURE — 3700000001 HC ADD 15 MINUTES (ANESTHESIA): Performed by: SURGERY

## 2021-12-29 PROCEDURE — 3600000019 HC SURGERY ROBOT ADDTL 15MIN: Performed by: SURGERY

## 2021-12-29 PROCEDURE — 7100000011 HC PHASE II RECOVERY - ADDTL 15 MIN: Performed by: SURGERY

## 2021-12-29 PROCEDURE — 2580000003 HC RX 258: Performed by: NURSE ANESTHETIST, CERTIFIED REGISTERED

## 2021-12-29 PROCEDURE — 2720000010 HC SURG SUPPLY STERILE: Performed by: SURGERY

## 2021-12-29 PROCEDURE — 3700000000 HC ANESTHESIA ATTENDED CARE: Performed by: SURGERY

## 2021-12-29 PROCEDURE — 2500000003 HC RX 250 WO HCPCS: Performed by: SURGERY

## 2021-12-29 DEVICE — MESH HERN L DIA8CM VENTRAL POLYPR EPTFE CIR SELF EXP PTCH: Type: IMPLANTABLE DEVICE | Site: ABDOMEN | Status: FUNCTIONAL

## 2021-12-29 RX ORDER — LABETALOL HYDROCHLORIDE 5 MG/ML
5 INJECTION, SOLUTION INTRAVENOUS EVERY 10 MIN PRN
Status: DISCONTINUED | OUTPATIENT
Start: 2021-12-29 | End: 2021-12-29 | Stop reason: HOSPADM

## 2021-12-29 RX ORDER — GLYCOPYRROLATE 0.2 MG/ML
INJECTION INTRAMUSCULAR; INTRAVENOUS PRN
Status: DISCONTINUED | OUTPATIENT
Start: 2021-12-29 | End: 2021-12-29 | Stop reason: SDUPTHER

## 2021-12-29 RX ORDER — FENTANYL CITRATE 50 UG/ML
INJECTION, SOLUTION INTRAMUSCULAR; INTRAVENOUS PRN
Status: DISCONTINUED | OUTPATIENT
Start: 2021-12-29 | End: 2021-12-29 | Stop reason: SDUPTHER

## 2021-12-29 RX ORDER — BUPIVACAINE HYDROCHLORIDE AND EPINEPHRINE 2.5; 5 MG/ML; UG/ML
INJECTION, SOLUTION INFILTRATION; PERINEURAL PRN
Status: DISCONTINUED | OUTPATIENT
Start: 2021-12-29 | End: 2021-12-29 | Stop reason: ALTCHOICE

## 2021-12-29 RX ORDER — HYDROCODONE BITARTRATE AND ACETAMINOPHEN 5; 325 MG/1; MG/1
1 TABLET ORAL EVERY 4 HOURS PRN
Qty: 18 TABLET | Refills: 0 | Status: SHIPPED | OUTPATIENT
Start: 2021-12-29 | End: 2021-12-29 | Stop reason: SDUPTHER

## 2021-12-29 RX ORDER — DIPHENHYDRAMINE HYDROCHLORIDE 50 MG/ML
12.5 INJECTION INTRAMUSCULAR; INTRAVENOUS
Status: DISCONTINUED | OUTPATIENT
Start: 2021-12-29 | End: 2021-12-29 | Stop reason: HOSPADM

## 2021-12-29 RX ORDER — FENTANYL CITRATE 50 UG/ML
50 INJECTION, SOLUTION INTRAMUSCULAR; INTRAVENOUS EVERY 5 MIN PRN
Status: DISCONTINUED | OUTPATIENT
Start: 2021-12-29 | End: 2021-12-29 | Stop reason: HOSPADM

## 2021-12-29 RX ORDER — PROCHLORPERAZINE EDISYLATE 5 MG/ML
5 INJECTION INTRAMUSCULAR; INTRAVENOUS
Status: DISCONTINUED | OUTPATIENT
Start: 2021-12-29 | End: 2021-12-29 | Stop reason: HOSPADM

## 2021-12-29 RX ORDER — HYDROCODONE BITARTRATE AND ACETAMINOPHEN 5; 325 MG/1; MG/1
1 TABLET ORAL
Status: COMPLETED | OUTPATIENT
Start: 2021-12-29 | End: 2021-12-29

## 2021-12-29 RX ORDER — LIDOCAINE HYDROCHLORIDE 10 MG/ML
INJECTION, SOLUTION INFILTRATION; PERINEURAL PRN
Status: DISCONTINUED | OUTPATIENT
Start: 2021-12-29 | End: 2021-12-29 | Stop reason: SDUPTHER

## 2021-12-29 RX ORDER — FENTANYL CITRATE 50 UG/ML
25 INJECTION, SOLUTION INTRAMUSCULAR; INTRAVENOUS EVERY 5 MIN PRN
Status: DISCONTINUED | OUTPATIENT
Start: 2021-12-29 | End: 2021-12-29 | Stop reason: HOSPADM

## 2021-12-29 RX ORDER — NEOSTIGMINE METHYLSULFATE 1 MG/ML
INJECTION, SOLUTION INTRAVENOUS PRN
Status: DISCONTINUED | OUTPATIENT
Start: 2021-12-29 | End: 2021-12-29 | Stop reason: SDUPTHER

## 2021-12-29 RX ORDER — SODIUM CHLORIDE 9 MG/ML
25 INJECTION, SOLUTION INTRAVENOUS PRN
Status: DISCONTINUED | OUTPATIENT
Start: 2021-12-29 | End: 2021-12-29 | Stop reason: HOSPADM

## 2021-12-29 RX ORDER — PROPOFOL 10 MG/ML
INJECTION, EMULSION INTRAVENOUS PRN
Status: DISCONTINUED | OUTPATIENT
Start: 2021-12-29 | End: 2021-12-29 | Stop reason: SDUPTHER

## 2021-12-29 RX ORDER — MIDAZOLAM HYDROCHLORIDE 1 MG/ML
2 INJECTION INTRAMUSCULAR; INTRAVENOUS
Status: DISCONTINUED | OUTPATIENT
Start: 2021-12-29 | End: 2021-12-29 | Stop reason: HOSPADM

## 2021-12-29 RX ORDER — SODIUM CHLORIDE 0.9 % (FLUSH) 0.9 %
5-40 SYRINGE (ML) INJECTION EVERY 12 HOURS SCHEDULED
Status: DISCONTINUED | OUTPATIENT
Start: 2021-12-29 | End: 2021-12-29 | Stop reason: HOSPADM

## 2021-12-29 RX ORDER — SODIUM CHLORIDE 450 MG/100ML
INJECTION, SOLUTION INTRAVENOUS CONTINUOUS PRN
Status: DISCONTINUED | OUTPATIENT
Start: 2021-12-29 | End: 2021-12-29 | Stop reason: SDUPTHER

## 2021-12-29 RX ORDER — FENTANYL CITRATE 50 UG/ML
25 INJECTION, SOLUTION INTRAMUSCULAR; INTRAVENOUS
Status: DISCONTINUED | OUTPATIENT
Start: 2021-12-29 | End: 2021-12-29 | Stop reason: HOSPADM

## 2021-12-29 RX ORDER — FENTANYL CITRATE 50 UG/ML
50 INJECTION, SOLUTION INTRAMUSCULAR; INTRAVENOUS
Status: DISCONTINUED | OUTPATIENT
Start: 2021-12-29 | End: 2021-12-29 | Stop reason: HOSPADM

## 2021-12-29 RX ORDER — CEFAZOLIN SODIUM 1 G/3ML
INJECTION, POWDER, FOR SOLUTION INTRAMUSCULAR; INTRAVENOUS PRN
Status: DISCONTINUED | OUTPATIENT
Start: 2021-12-29 | End: 2021-12-29 | Stop reason: SDUPTHER

## 2021-12-29 RX ORDER — ENALAPRILAT 2.5 MG/2ML
1.25 INJECTION INTRAVENOUS
Status: DISCONTINUED | OUTPATIENT
Start: 2021-12-29 | End: 2021-12-29 | Stop reason: HOSPADM

## 2021-12-29 RX ORDER — HYDRALAZINE HYDROCHLORIDE 20 MG/ML
5 INJECTION INTRAMUSCULAR; INTRAVENOUS EVERY 10 MIN PRN
Status: DISCONTINUED | OUTPATIENT
Start: 2021-12-29 | End: 2021-12-29 | Stop reason: HOSPADM

## 2021-12-29 RX ORDER — LIDOCAINE HYDROCHLORIDE 10 MG/ML
1 INJECTION, SOLUTION EPIDURAL; INFILTRATION; INTRACAUDAL; PERINEURAL
Status: DISCONTINUED | OUTPATIENT
Start: 2021-12-29 | End: 2021-12-29 | Stop reason: HOSPADM

## 2021-12-29 RX ORDER — MEPERIDINE HYDROCHLORIDE 25 MG/ML
12.5 INJECTION INTRAMUSCULAR; INTRAVENOUS; SUBCUTANEOUS EVERY 5 MIN PRN
Status: DISCONTINUED | OUTPATIENT
Start: 2021-12-29 | End: 2021-12-29 | Stop reason: HOSPADM

## 2021-12-29 RX ORDER — SODIUM CHLORIDE 0.9 % (FLUSH) 0.9 %
5-40 SYRINGE (ML) INJECTION PRN
Status: DISCONTINUED | OUTPATIENT
Start: 2021-12-29 | End: 2021-12-29 | Stop reason: HOSPADM

## 2021-12-29 RX ORDER — HYDROMORPHONE HYDROCHLORIDE 1 MG/ML
0.5 INJECTION, SOLUTION INTRAMUSCULAR; INTRAVENOUS; SUBCUTANEOUS EVERY 5 MIN PRN
Status: DISCONTINUED | OUTPATIENT
Start: 2021-12-29 | End: 2021-12-29 | Stop reason: HOSPADM

## 2021-12-29 RX ORDER — ONDANSETRON 2 MG/ML
4 INJECTION INTRAMUSCULAR; INTRAVENOUS
Status: COMPLETED | OUTPATIENT
Start: 2021-12-29 | End: 2021-12-29

## 2021-12-29 RX ORDER — ROCURONIUM BROMIDE 10 MG/ML
INJECTION, SOLUTION INTRAVENOUS PRN
Status: DISCONTINUED | OUTPATIENT
Start: 2021-12-29 | End: 2021-12-29 | Stop reason: SDUPTHER

## 2021-12-29 RX ORDER — SODIUM CHLORIDE 9 MG/ML
INJECTION, SOLUTION INTRAVENOUS CONTINUOUS
Status: DISCONTINUED | OUTPATIENT
Start: 2021-12-29 | End: 2021-12-29 | Stop reason: HOSPADM

## 2021-12-29 RX ORDER — HYDROMORPHONE HYDROCHLORIDE 1 MG/ML
0.25 INJECTION, SOLUTION INTRAMUSCULAR; INTRAVENOUS; SUBCUTANEOUS EVERY 5 MIN PRN
Status: DISCONTINUED | OUTPATIENT
Start: 2021-12-29 | End: 2021-12-29 | Stop reason: HOSPADM

## 2021-12-29 RX ORDER — HYDROCODONE BITARTRATE AND ACETAMINOPHEN 5; 325 MG/1; MG/1
1 TABLET ORAL EVERY 4 HOURS PRN
Qty: 18 TABLET | Refills: 0 | Status: SHIPPED | OUTPATIENT
Start: 2021-12-29 | End: 2022-01-01

## 2021-12-29 RX ORDER — SODIUM CHLORIDE, SODIUM LACTATE, POTASSIUM CHLORIDE, CALCIUM CHLORIDE 600; 310; 30; 20 MG/100ML; MG/100ML; MG/100ML; MG/100ML
INJECTION, SOLUTION INTRAVENOUS CONTINUOUS
Status: DISCONTINUED | OUTPATIENT
Start: 2021-12-29 | End: 2021-12-29 | Stop reason: HOSPADM

## 2021-12-29 RX ADMIN — NEOSTIGMINE 3 MG: 1 INJECTION INTRAVENOUS at 13:22

## 2021-12-29 RX ADMIN — ONDANSETRON 4 MG: 2 INJECTION INTRAMUSCULAR; INTRAVENOUS at 13:28

## 2021-12-29 RX ADMIN — PROPOFOL 120 MG: 10 INJECTION, EMULSION INTRAVENOUS at 12:24

## 2021-12-29 RX ADMIN — HYDROCODONE BITARTRATE AND ACETAMINOPHEN 1 TABLET: 5; 325 TABLET ORAL at 16:38

## 2021-12-29 RX ADMIN — LIDOCAINE HYDROCHLORIDE 50 MG: 10 INJECTION, SOLUTION INFILTRATION; PERINEURAL at 12:22

## 2021-12-29 RX ADMIN — HYDROMORPHONE HYDROCHLORIDE 0.5 MG: 1 INJECTION, SOLUTION INTRAMUSCULAR; INTRAVENOUS; SUBCUTANEOUS at 14:24

## 2021-12-29 RX ADMIN — Medication 5 MG: at 12:58

## 2021-12-29 RX ADMIN — FENTANYL CITRATE 50 MCG: 50 INJECTION, SOLUTION INTRAMUSCULAR; INTRAVENOUS at 12:22

## 2021-12-29 RX ADMIN — GLYCOPYRROLATE 0.4 MG: 0.2 INJECTION, SOLUTION INTRAMUSCULAR; INTRAVENOUS at 13:22

## 2021-12-29 RX ADMIN — ROCURONIUM BROMIDE 40 MG: 10 INJECTION, SOLUTION INTRAVENOUS at 12:24

## 2021-12-29 RX ADMIN — Medication 5 MG: at 12:51

## 2021-12-29 RX ADMIN — HYDROMORPHONE HYDROCHLORIDE 0.5 MG: 1 INJECTION, SOLUTION INTRAMUSCULAR; INTRAVENOUS; SUBCUTANEOUS at 14:39

## 2021-12-29 RX ADMIN — HYDRALAZINE HYDROCHLORIDE 5 MG: 20 INJECTION INTRAMUSCULAR; INTRAVENOUS at 13:56

## 2021-12-29 RX ADMIN — CEFAZOLIN SODIUM 1000 MG: 1 INJECTION, POWDER, FOR SOLUTION INTRAMUSCULAR; INTRAVENOUS at 12:43

## 2021-12-29 RX ADMIN — FENTANYL CITRATE 50 MCG: 50 INJECTION, SOLUTION INTRAMUSCULAR; INTRAVENOUS at 12:31

## 2021-12-29 RX ADMIN — SODIUM CHLORIDE: 4.5 INJECTION, SOLUTION INTRAVENOUS at 12:20

## 2021-12-29 ASSESSMENT — ENCOUNTER SYMPTOMS: SHORTNESS OF BREATH: 0

## 2021-12-29 ASSESSMENT — PAIN DESCRIPTION - ORIENTATION
ORIENTATION: MID
ORIENTATION: MID

## 2021-12-29 ASSESSMENT — LIFESTYLE VARIABLES: SMOKING_STATUS: 0

## 2021-12-29 ASSESSMENT — PAIN DESCRIPTION - FREQUENCY
FREQUENCY: CONTINUOUS
FREQUENCY: CONTINUOUS

## 2021-12-29 ASSESSMENT — PAIN DESCRIPTION - LOCATION
LOCATION: ABDOMEN
LOCATION: ABDOMEN

## 2021-12-29 ASSESSMENT — PAIN - FUNCTIONAL ASSESSMENT: PAIN_FUNCTIONAL_ASSESSMENT: 0-10

## 2021-12-29 ASSESSMENT — PAIN DESCRIPTION - DESCRIPTORS
DESCRIPTORS: ACHING
DESCRIPTORS: ACHING

## 2021-12-29 ASSESSMENT — PAIN DESCRIPTION - PAIN TYPE
TYPE: SURGICAL PAIN
TYPE: SURGICAL PAIN

## 2021-12-29 ASSESSMENT — PAIN SCALES - GENERAL
PAINLEVEL_OUTOF10: 4
PAINLEVEL_OUTOF10: 7
PAINLEVEL_OUTOF10: 5
PAINLEVEL_OUTOF10: 7
PAINLEVEL_OUTOF10: 4

## 2021-12-29 NOTE — BRIEF OP NOTE
Brief Postoperative Note      Patient: Riley Chambers Sr.  YOB: 1945  MRN: [id number]    Date of Procedure: 12/29/2021    Pre-Op Diagnosis: EPIGASTRIC HERNIA ESRD DIALYSIS    Post-Op Diagnosis:   Recurrent incisional hernia  Incarcerated umbilical hernia       Procedure:  Laparoscopic recurrent incisional hernia repair, umbilical hernia repair    Surgeon(s):  Alia Grullon DO    Assistant:  First Assistant: Jessie Schmitz    Anesthesia: General    Estimated Blood Loss (mL): Minimal    Complications: None    Specimens:   * No specimens in log *    Implants:  Implant Name Type Inv. Item Serial No.  Lot No. LRB No. Used Action   MESH YI L DIA8CM VENTRAL POLYPR EPTFE CIR SELF EXP PTCH  MESH YI L DIA8CM VENTRAL POLYPR EPTFE CIR SELF EXP PTCH  BARD DAVOL-WD [id number] N/A 1 Implanted         Drains: * No LDAs found *    Findings: Recurrent incisional hernia. Incarcerated umbilical hernia. Umbilical hernia primarily repaired. Recurrent incisional hernia repaired with mesh.     Electronically signed by Alia Grullon DO on 12/29/2021 at 1:32 PM

## 2021-12-29 NOTE — PROGRESS NOTES
OR 10 called after it was discovered that the patient's pain script was printed on a plain white piece of paper. Dr. Darline Dye and Ginna Cuellar, JIM said, \"Send the patient home and dr. Yanni Melara will send the script to [facility] after he finishes this case. \" This info was given to the pt and s/o.

## 2021-12-29 NOTE — ANESTHESIA POSTPROCEDURE EVALUATION
Department of Anesthesiology  Postprocedure Note    Patient: Sirena Garrison MRN: [id number]  YOB: 1945  Date of evaluation: 12/29/2021  Time:  1:40 PM     Procedure Summary     Date: 12/29/21 Room / Location: [address]    Anesthesia Start: 7264 Anesthesia Stop:     Procedure: LAPAROSCOPIC PRIMARY EPIGASTRIC HERNIA REPAIR WITH MESH (N/A Abdomen) Diagnosis: (EPIGASTRIC HERNIA ESRD DIALYSIS)    Surgeons: Melinda Martines DO Responsible Provider: MARITA Robertson CRNA    Anesthesia Type: general ASA Status: 3          Anesthesia Type: No value filed. Gideon Phase I: Gideon Score: 10    Gideon Phase II:      Last vitals: Reviewed and per EMR flowsheets.        Anesthesia Post Evaluation    Patient location during evaluation: PACU  Patient participation: waiting for patient participation  Level of consciousness: awake and lethargic  Pain score: 0  Airway patency: patent  Nausea & Vomiting: no nausea and no vomiting  Complications: no  Cardiovascular status: blood pressure returned to baseline  Respiratory status: acceptable  Hydration status: euvolemic  Comments: Report to RN

## 2021-12-29 NOTE — H&P
Mr. Leeanna Sandoval is a 77year old male who presents with a complaint of ESRD on dialysis. He is now interested in starting peritoneal dialysis. The patient reports that he has been on dialysis for about 4 months. He goes Tuesday, Thursday, Saturday to the [facility] outpatient center. She uses a permcath. Her has had a cardiac stent in 2019, he sees Dr. Dahlia Gilmore for cardiology. The patient also complains of an abdominal wall hernia. He states that it has been there for quite some time.  There can be a knot there that gets smaller.      Past Medical History        Past Medical History:   Diagnosis Date    Chronic kidney disease      Heart disease      Hyperlipidemia      Hypertension           Past Surgical History         Past Surgical History:   Procedure Laterality Date    CORONARY ANGIOPLASTY WITH STENT PLACEMENT        UMBILICAL HERNIA REPAIR             Current Facility-Administered Medications          Current Outpatient Medications   Medication Sig Dispense Refill    dilTIAZem (CARDIZEM 12 HR) 120 MG extended release capsule Take 120 mg by mouth 2 times daily        CLONIDINE TD Place onto the skin        allopurinol (ZYLOPRIM) 100 MG tablet Take 2 tablets by mouth        aspirin 81 MG chewable tablet Take 1 tablet by mouth        carvedilol (COREG) 25 MG tablet Take 1 tablet by mouth        cloNIDine (CATAPRES) 0.1 MG tablet Take 1 tablet by mouth        isosorbide mononitrate (IMDUR) 120 MG extended release tablet Take 1 tablet by mouth        lisinopril (PRINIVIL;ZESTRIL) 40 MG tablet Take 1 tablet by mouth        ranolazine (RANEXA) 500 MG extended release tablet Take 1 tablet by mouth        rosuvastatin (CRESTOR) 40 MG tablet Take 0.5 tablets by mouth        DULoxetine HCl 60 MG CSDR Take by mouth        calcitRIOL (ROCALTROL) 0.5 MCG capsule Take by mouth        sodium bicarbonate 325 MG tablet Take 1 tablet by mouth        clopidogrel (PLAVIX) 75 MG tablet Take 1 tablet by mouth        ferrous sulfate (IRON 325) 325 (65 Fe) MG tablet Take 1 tablet by mouth        ascorbic acid (VITAMIN C) 500 MG tablet Take 1 tablet by mouth        Calcium-Mag-Vit C-Vit D 144-71- PACK Take 1 tablet by mouth daily        vitamin D 25 MCG (1000 UT) CAPS Take by mouth        vitamin E 1000 units capsule Take 1,000 Units by mouth daily        iron sucrose (VENOFER) 20 MG/ML injection 100 mg        amLODIPine (NORVASC) 10 MG tablet Take 1 tablet by mouth        hydrALAZINE (APRESOLINE) 100 MG tablet Take 1 tablet by mouth        hydroCHLOROthiazide (HYDRODIURIL) 25 MG tablet Take 25 mg by mouth daily        nitroGLYCERIN (NITROSTAT) 0.4 MG SL tablet Place 1 tablet under the tongue (Patient not taking: Reported on 12/17/2021)        meclizine (ANTIVERT) 25 MG CHEW Take 1 tablet by mouth (Patient not taking: Reported on 12/17/2021)        raNITIdine (ZANTAC) 150 MG tablet Take 150 mg by mouth 2 times daily (Patient not taking: Reported on 12/17/2021)        Methoxy PEG-Epoetin Beta (MIRCERA IJ) 75 mcg (Patient not taking: Reported on 12/17/2021)        Tuberculin PPD (TUBERSOL ID) Inject 0.1 mLs into the skin (Patient not taking: Reported on 12/17/2021)        Ascorbic Acid  MG CPCR Take 500 mg by mouth 2 times daily          No current facility-administered medications for this visit.         Allergies: Patient has no known allergies.     Family History   History reviewed. No pertinent family history.        Social History           Tobacco Use    Smoking status: Former Smoker    Smokeless tobacco: Never Used   Substance Use Topics    Alcohol use: Never         Review of Systems   Constitutional: Positive for fatigue. Negative for activity change and appetite change. HENT: Positive for nosebleeds and rhinorrhea. Negative for congestion. Eyes: Negative for pain, redness and visual disturbance. Respiratory: Positive for apnea and shortness of breath. Negative for cough.     Cardiovascular: Positive for chest pain and palpitations. Negative for leg swelling. Gastrointestinal: Positive for constipation. Negative for abdominal distention, diarrhea and vomiting. Endocrine: Negative for polydipsia and polyphagia. Genitourinary: Negative for dysuria, frequency and hematuria. Musculoskeletal: Positive for back pain. Negative for arthralgias and gait problem. Skin: Negative for rash and wound. Neurological: Positive for dizziness, weakness and light-headedness. Hematological: Bruises/bleeds easily. Psychiatric/Behavioral: Positive for sleep disturbance. Negative for confusion. The patient is nervous/anxious.          Physical Exam  Vitals reviewed. Constitutional:       General: He is not in acute distress. Appearance: Normal appearance. HENT:      Head: Normocephalic and atraumatic. Nose:      Comments: Wearing mask  Eyes:      General: No scleral icterus. Pupils: Pupils are equal, round, and reactive to light. Cardiovascular:      Rate and Rhythm: Normal rate and regular rhythm. Heart sounds: No murmur heard.       Pulmonary:      Effort: Pulmonary effort is normal. No respiratory distress. Breath sounds: No wheezing. Abdominal:      General: There is no distension. Palpations: Abdomen is soft. Tenderness: There is no abdominal tenderness. There is no guarding. Hernia: A hernia (reducible epigastric hernia, about 1.5 cm) is present. Musculoskeletal:         General: No swelling or deformity. Normal range of motion. Cervical back: Neck supple. No rigidity. Lymphadenopathy:      Cervical: No cervical adenopathy. Skin:     General: Skin is warm and dry. Coloration: Skin is not jaundiced. Neurological:      General: No focal deficit present. Mental Status: He is alert. Mental status is at baseline.    Psychiatric:         Mood and Affect: Mood normal.         Behavior: Behavior normal.               Assessment and plan:  76 year old male with ESRD and abdominal hernia  I discussed with the patient the risks and benefits of laparoscopic PD catheter placement, including but not limited to, bleeding, infection, injury to surrounding structures, and need for open surgery. I discussed that, at the time of laparoscopy, can examine the area of the hernia. If it is small as on palpation, can plan primary laparoscopic repair and placement of PD catheter, with leaving the PD catheter buried for 6 weeks to give the hernia time to heal. If, at the time of surgery, the hernia is larger or more complex than palpated, will plan for laparoscopic hernia repair with mesh, and placement of the PD catheter after he has healed up from the hernia repair. The patient expressed understanding and would like to proceed with surgery.       Brandon Elmore DO

## 2021-12-29 NOTE — ANESTHESIA PRE PROCEDURE
Department of Anesthesiology  Preprocedure Note       Name:  Estella Del Cid Sr.   Age:  77 y.o.  :  1945                                          MRN:  [id number]         Date:  2021      Surgeon: Jean Pierre Query):  Arleth Dejesus DO    Procedure: Procedure(s):  LAPAROSCOPIC PRIMARY EPIGASTRIC HERNIA REPAIR  LAPAROSCOPIC PERITONEAL DIALYSIS CATHETER PLACEMENT, BURIED    Medications prior to admission:   Prior to Admission medications    Medication Sig Start Date End Date Taking?  Authorizing Provider   carvedilol (COREG) 3.125 MG tablet Take 3.125 mg by mouth 2 times daily (with meals)   Yes Historical Provider, MD   dilTIAZem (CARDIZEM 12 HR) 120 MG extended release capsule Take 120 mg by mouth daily    Yes Historical Provider, MD   allopurinol (ZYLOPRIM) 100 MG tablet Take 200 mg by mouth daily  21  Yes Historical Provider, MD   aspirin 81 MG chewable tablet Take 1 tablet by mouth daily  21  Yes Historical Provider, MD   cloNIDine (CATAPRES) 0.1 MG tablet Take 1 tablet by mouth as needed for High Blood Pressure  21  Yes Historical Provider, MD   isosorbide mononitrate (IMDUR) 120 MG extended release tablet Take 1 tablet by mouth daily  21  Yes Historical Provider, MD   nitroGLYCERIN (NITROSTAT) 0.4 MG SL tablet Place 1 tablet under the tongue every 5 minutes as needed  21  Yes Historical Provider, MD   ranolazine (RANEXA) 500 MG extended release tablet Take 1 tablet by mouth 2 times daily  21  Yes Historical Provider, MD   rosuvastatin (CRESTOR) 40 MG tablet Take 20 mg by mouth daily  21  Yes Historical Provider, MD   DULoxetine HCl 60 MG CSDR Take 60 mg by mouth daily  21  Yes Historical Provider, MD   meclizine (ANTIVERT) 25 MG CHEW Take 25 mg by mouth 2 times daily as needed  21  Yes Historical Provider, MD   sodium bicarbonate 325 MG tablet Take 1 tablet by mouth 3 times daily  21  Yes Historical Provider, MD   clopidogrel (PLAVIX) 75 MG tablet Take 1 tablet by mouth 7/19/21  Yes Historical Provider, MD   ferrous sulfate (IRON 325) 325 (65 Fe) MG tablet Take 1 tablet by mouth 3 times daily (with meals)  7/29/21  Yes Historical Provider, MD   Ascorbic Acid  MG CPCR Take 500 mg by mouth 2 times daily   Yes Historical Provider, MD   Calcium-Mag-Vit C-Vit D 045-91- PACK Take 1 tablet by mouth daily   Yes Historical Provider, MD   vitamin D 25 MCG (1000 UT) CAPS Take by mouth 7/29/21  Yes Historical Provider, MD   vitamin E 1000 units capsule Take 400 Units by mouth daily    Yes Historical Provider, MD   Multiple Vitamins-Minerals (THERAPEUTIC MULTIVITAMIN-MINERALS) tablet Take 1 tablet by mouth daily    Historical Provider, MD   calcitRIOL (ROCALTROL) 0.5 MCG capsule Take 0.5 mcg by mouth daily   Patient not taking: Reported on 12/29/2021 7/21/21   Historical Provider, MD   Methoxy PEG-Epoetin Beta (MIRCERA IJ) 75 mcg  Patient not taking: Reported on 12/17/2021 7/23/21 7/22/22  Historical Provider, MD   Tuberculin PPD (TUBERSOL ID) Inject 0.1 mLs into the skin  Patient not taking: Reported on 12/17/2021 7/19/21   Historical Provider, MD   ascorbic acid (VITAMIN C) 500 MG tablet Take 1 tablet by mouth 7/19/21   Historical Provider, MD       Current medications:    No current facility-administered medications for this encounter. Allergies:  No Known Allergies    Problem List:  There is no problem list on file for this patient.       Past Medical History:        Diagnosis Date    Chronic kidney disease     Diabetes mellitus ([facility] [address]     Heart disease     Hemodialysis patient ([facility] [address]     Hyperlipidemia     Hypertension        Past Surgical History:        Procedure Laterality Date    CORONARY ANGIOPLASTY WITH STENT PLACEMENT      UMBILICAL HERNIA REPAIR         Social History:    Social History     Tobacco Use    Smoking status: Former Smoker    Smokeless tobacco: Never Used   Substance Use Topics    Alcohol use: Never Counseling given: Not Answered      Vital Signs (Current):   Vitals:    12/29/21 1108   BP: (!) 184/110   Pulse: 106   Resp: 22   Temp: 98.6 °F (37 °C)   TempSrc: Tympanic   SpO2: 99%   Weight: 146 lb (66.2 kg)   Height: 5' 11\" (1.803 m)                                              BP Readings from Last 3 Encounters:   12/29/21 (!) 184/110   12/17/21 126/68   08/05/21 (!) 140/68       NPO Status: Time of last liquid consumption: 2100                        Time of last solid consumption: 2100                        Date of last liquid consumption: 12/28/21                        Date of last solid food consumption: 12/28/21    BMI:   Wt Readings from Last 3 Encounters:   12/29/21 146 lb (66.2 kg)   12/17/21 145 lb 3.2 oz (65.9 kg)   08/05/21 147 lb 6.4 oz (66.9 kg)     Body mass index is 20.36 kg/m². CBC:   Lab Results   Component Value Date    WBC 6.8 12/28/2021    RBC 3.37 12/28/2021    HGB 10.4 12/28/2021    HCT 32.5 12/28/2021    MCV 96.4 12/28/2021    RDW 15.5 12/28/2021     12/28/2021       CMP:   Lab Results   Component Value Date     12/28/2021    K 4.7 12/28/2021     12/28/2021    CO2 23 12/28/2021    BUN 66 12/28/2021    CREATININE 7.4 12/28/2021    GFRAA 9 12/28/2021    LABGLOM 7 12/28/2021    GLUCOSE 91 12/28/2021    CALCIUM 9.9 12/28/2021       POC Tests: No results for input(s): POCGLU, POCNA, POCK, POCCL, POCBUN, POCHEMO, POCHCT in the last 72 hours.     Coags: No results found for: PROTIME, INR, APTT    HCG (If Applicable): No results found for: PREGTESTUR, PREGSERUM, HCG, HCGQUANT     ABGs: No results found for: PHART, PO2ART, SDC6WSX, MVW6EAN, BEART, U7UMMQKB     Type & Screen (If Applicable):  No results found for: LABABO, LABRH    Drug/Infectious Status (If Applicable):  No results found for: HIV, HEPCAB    COVID-19 Screening (If Applicable): No results found for: COVID19        Anesthesia Evaluation  Patient summary reviewed and Nursing notes reviewed no history of anesthetic complications:   Airway: Mallampati: II  TM distance: >3 FB   Neck ROM: limited  Mouth opening: < 3 FB Dental: normal exam         Pulmonary:       (-) shortness of breath, sleep apnea and not a current smoker                           Cardiovascular:  Exercise tolerance: good (>4 METS),   (+) hypertension:, CABG/stent: no interval change, dysrhythmias: SVT, hyperlipidemia    (-) pacemaker    ECG reviewed        Stress test reviewed       Beta Blocker:  Dose within 24 Hrs      ROS comment: Stress test:  This result has an attachment that is not available. · Left ventricular ejection fraction appears to be 41 - 45%. Left   ventricular systolic function is mildly decreased. · The following left ventricular wall segments are hypokinetic: mid   anterolateral.   · Estimated right ventricular systolic pressure from tricuspid   regurgitation is normal (<35 mmHg). Left Ventricle   Left ventricular ejection fraction appears to be 41 - 45%. Left ventricular systolic function is mildly decreased. Normal left ventricular cavity size and wall thickness noted. All left ventricular wall segments contract normally. Unable to assess left atrial pressure. Neuro/Psych:      (-) seizures and CVA           GI/Hepatic/Renal:   (+) renal disease: dialysis and ESRD,      (-) GERD       Endo/Other:    (+) DiabetesType II DM, , blood dyscrasia (plavix stopped days ago, anemia)::., no malignancy/cancer. (-) no malignancy/cancer               Abdominal:             Vascular:     - DVT and PE. Other Findings: Dialysis line right upper chest wall            Anesthesia Plan      general     ASA 3       Induction: intravenous. MIPS: Postoperative opioids intended and Prophylactic antiemetics administered. Anesthetic plan and risks discussed with patient. Use of blood products discussed with patient whom consented to blood products.                    Diane Fajardo DO   12/29/2021

## 2021-12-31 NOTE — OP NOTE
[facility] [employer] [facility] [address], [address] [facility]                                OPERATIVE REPORT    PATIENT NAME: Lee Iqbal                      :        1945  MED REC NO:   [id number]                              ROOM:  ACCOUNT NO:   [de-identified]                           ADMIT DATE: 2021  PROVIDER:     Jenny De Leon MD    DATE OF PROCEDURE:  2021    PREOPERATIVE DIAGNOSES:  1.  Epigastric hernia. 2.  End-stage renal disease. POSTOPERATIVE DIAGNOSES:  1. Recurrent incisional hernia. 2.  Incarcerated umbilical hernia. PROCEDURES:  1. Laparoscopic recurrent incisional hernia repair. 2.  Laparoscopic umbilical hernia repair. SURGEON:  Jenny De Leon MD      ESTIMATED BLOOD LOSS:  Minimal.    INDICATIONS FOR PROCEDURE:  The patient is a 78-year-old male who  presented to the office initially to be seen for complaints of recurrent  hernia near the epigastrium along with end-stage renal disease wishing  for placement of PD dialysis catheter. This was discussed at length  with the patient. Risks, benefits, and alternatives of the procedure  were discussed with the patient. All questions were answered to his  satisfaction. At that time, we were planning on doing a diagnostic  laparoscopy. If the hernias were able to be closed using primary, then  we would place the PD catheter. If we have to place a mesh, we will  place this at a later time. PROCEDURE IN DETAIL:  The patient was identified in the preoperative  holding suite, consent was confirmed and placed on the chart. Preoperative antibiotics were administered at this time. Next, the  patient was taken to the operative suite and placed in the supine  position. Endotracheal anesthesia was supplied by the Department of  Anesthesia. Next, the abdomen was prepped and draped in the usual  sterile fashion. Time-out was performed and all were in agreement. Initially, the abdomen was entered in the left upper quadrant using 5 mm  fios trocar under direct visualization. A 30-degree 5 mm scope was  placed in the abdomen. No harm noted from entering the abdomen. At  that time, we then examined the posterior part of the abdominal wall and  noted two hernia defects. One was incarcerated umbilical hernia, then  the recurrence on his previous repair, which appeared to be about 6 cm  away. At that time, a second 5 mm trocar was placed in the left upper  quadrant. The 5 mm trocar in the left upper quadrant was switched to 12  mm trocar. Using a LigaSure, then the defect was then reduced at the  umbilical site. Posterior part of the abdominal wall was then cleaned  off along with the defect at the superior site. The decision was made  to close the small umbilical defect primarily. Transfascial stitch was  passed using 0 PDS and this was closed with good result. Next, the  Ventralex mesh was brought into the abdomen. This was held to the  abdominal wall using a stitch, and this was covering the defect at the  recurrent site. Inner and outer crown of tacks were then placed to  SecureStrap tacker. Good repair was noted at that time. Next, the left  upper quadrant site was then closed transfascially using an 0 Vicryl  stitch. Pneumoperitoneum was released and all trocars were removed  under direct visualization. Incision was then cleaned and dried, closed  using 4-0 Monocryl in a buried interrupted fashion. Skin was cleaned  and dried. Dermabond applied. All needle, sponge, and instrument  counts were correct x2. The patient was taken to the postoperative  suite to be recovered per protocol. GROSS FINDINGS:  The patient is a 58-year-old male who presented with  epigastric hernia for possible placement of PD catheter. The patient  was noted to have two defects. The inferior defect was reduced and  closed primarily.   Superior defect was repaired using an 8 cm round  mesh.         Braden Mark MD    D: 12/30/2021 10:44:48      T: 12/30/2021 11:32:46     [id number]  Job#: [id number]     Doc#: [id number]    CC:  Primary Care Physician

## 2022-01-01 ENCOUNTER — APPOINTMENT (OUTPATIENT)
Dept: CT IMAGING | Facility: HOSPITAL | Age: 77
End: 2022-01-01

## 2022-01-01 ENCOUNTER — APPOINTMENT (OUTPATIENT)
Dept: GENERAL RADIOLOGY | Facility: HOSPITAL | Age: 77
End: 2022-01-01

## 2022-01-01 ENCOUNTER — HOSPITAL ENCOUNTER (EMERGENCY)
Facility: HOSPITAL | Age: 77
Discharge: ANOTHER HOSPITAL | End: 2022-01-04
Attending: EMERGENCY MEDICINE | Admitting: EMERGENCY MEDICINE

## 2022-01-01 VITALS
DIASTOLIC BLOOD PRESSURE: 80 MMHG | OXYGEN SATURATION: 100 % | SYSTOLIC BLOOD PRESSURE: 145 MMHG | HEART RATE: 132 BPM | TEMPERATURE: 98.1 F | BODY MASS INDEX: 20.16 KG/M2 | WEIGHT: 144 LBS | HEIGHT: 71 IN | RESPIRATION RATE: 16 BRPM

## 2022-01-01 DIAGNOSIS — N18.6 ESRD (END STAGE RENAL DISEASE): ICD-10-CM

## 2022-01-01 DIAGNOSIS — K56.609 SBO (SMALL BOWEL OBSTRUCTION): Primary | ICD-10-CM

## 2022-01-01 LAB
ALBUMIN SERPL-MCNC: 4.4 G/DL (ref 3.5–5.2)
ALBUMIN/GLOB SERPL: 1.3 G/DL
ALP SERPL-CCNC: 93 U/L (ref 39–117)
ALT SERPL W P-5'-P-CCNC: 10 U/L (ref 1–41)
ANION GAP SERPL CALCULATED.3IONS-SCNC: 18 MMOL/L (ref 5–15)
AST SERPL-CCNC: 34 U/L (ref 1–40)
BACTERIA SPEC AEROBE CULT: NORMAL
BACTERIA SPEC AEROBE CULT: NORMAL
BASOPHILS # BLD AUTO: 0.02 10*3/MM3 (ref 0–0.2)
BASOPHILS NFR BLD AUTO: 0.4 % (ref 0–1.5)
BILIRUB SERPL-MCNC: 0.5 MG/DL (ref 0–1.2)
BUN SERPL-MCNC: 99 MG/DL (ref 8–23)
BUN/CREAT SERPL: 10 (ref 7–25)
CALCIUM SPEC-SCNC: 9.3 MG/DL (ref 8.6–10.5)
CHLORIDE SERPL-SCNC: 102 MMOL/L (ref 98–107)
CO2 SERPL-SCNC: 20 MMOL/L (ref 22–29)
CREAT SERPL-MCNC: 9.94 MG/DL (ref 0.76–1.27)
D-LACTATE SERPL-SCNC: 1.2 MMOL/L (ref 0.5–2)
DEPRECATED RDW RBC AUTO: 53.4 FL (ref 37–54)
EOSINOPHIL # BLD AUTO: 0.13 10*3/MM3 (ref 0–0.4)
EOSINOPHIL NFR BLD AUTO: 2.4 % (ref 0.3–6.2)
ERYTHROCYTE [DISTWIDTH] IN BLOOD BY AUTOMATED COUNT: 15.5 % (ref 12.3–15.4)
GFR SERPL CREATININE-BSD FRML MDRD: 6 ML/MIN/1.73
GFR SERPL CREATININE-BSD FRML MDRD: ABNORMAL ML/MIN/{1.73_M2}
GLOBULIN UR ELPH-MCNC: 3.5 GM/DL
GLUCOSE SERPL-MCNC: 136 MG/DL (ref 65–99)
HCT VFR BLD AUTO: 37.6 % (ref 37.5–51)
HGB BLD-MCNC: 12.6 G/DL (ref 13–17.7)
HOLD SPECIMEN: NORMAL
IMM GRANULOCYTES # BLD AUTO: 0.01 10*3/MM3 (ref 0–0.05)
IMM GRANULOCYTES NFR BLD AUTO: 0.2 % (ref 0–0.5)
LIPASE SERPL-CCNC: 14 U/L (ref 13–60)
LYMPHOCYTES # BLD AUTO: 0.86 10*3/MM3 (ref 0.7–3.1)
LYMPHOCYTES NFR BLD AUTO: 16.1 % (ref 19.6–45.3)
MCH RBC QN AUTO: 31.4 PG (ref 26.6–33)
MCHC RBC AUTO-ENTMCNC: 33.5 G/DL (ref 31.5–35.7)
MCV RBC AUTO: 93.8 FL (ref 79–97)
MONOCYTES # BLD AUTO: 0.64 10*3/MM3 (ref 0.1–0.9)
MONOCYTES NFR BLD AUTO: 12 % (ref 5–12)
NEUTROPHILS NFR BLD AUTO: 3.68 10*3/MM3 (ref 1.7–7)
NEUTROPHILS NFR BLD AUTO: 68.9 % (ref 42.7–76)
NRBC BLD AUTO-RTO: 0 /100 WBC (ref 0–0.2)
PLATELET # BLD AUTO: 370 10*3/MM3 (ref 140–450)
PMV BLD AUTO: 10 FL (ref 6–12)
POTASSIUM SERPL-SCNC: 4.6 MMOL/L (ref 3.5–5.2)
PROT SERPL-MCNC: 7.9 G/DL (ref 6–8.5)
QT INTERVAL: 294 MS
QTC INTERVAL: 443 MS
RBC # BLD AUTO: 4.01 10*6/MM3 (ref 4.14–5.8)
SARS-COV-2 RNA PNL SPEC NAA+PROBE: NOT DETECTED
SODIUM SERPL-SCNC: 140 MMOL/L (ref 136–145)
WBC NRBC COR # BLD: 5.34 10*3/MM3 (ref 3.4–10.8)
WHOLE BLOOD HOLD SPECIMEN: NORMAL
WHOLE BLOOD HOLD SPECIMEN: NORMAL

## 2022-01-01 PROCEDURE — 83605 ASSAY OF LACTIC ACID: CPT | Performed by: EMERGENCY MEDICINE

## 2022-01-01 PROCEDURE — 93010 ELECTROCARDIOGRAM REPORT: CPT | Performed by: INTERNAL MEDICINE

## 2022-01-01 PROCEDURE — 93005 ELECTROCARDIOGRAM TRACING: CPT | Performed by: EMERGENCY MEDICINE

## 2022-01-01 PROCEDURE — 25010000002 FENTANYL CITRATE (PF) 50 MCG/ML SOLUTION: Performed by: EMERGENCY MEDICINE

## 2022-01-01 PROCEDURE — 74176 CT ABD & PELVIS W/O CONTRAST: CPT

## 2022-01-01 PROCEDURE — 83690 ASSAY OF LIPASE: CPT | Performed by: EMERGENCY MEDICINE

## 2022-01-01 PROCEDURE — 96375 TX/PRO/DX INJ NEW DRUG ADDON: CPT

## 2022-01-01 PROCEDURE — 85025 COMPLETE CBC W/AUTO DIFF WBC: CPT | Performed by: EMERGENCY MEDICINE

## 2022-01-01 PROCEDURE — 96374 THER/PROPH/DIAG INJ IV PUSH: CPT

## 2022-01-01 PROCEDURE — 99283 EMERGENCY DEPT VISIT LOW MDM: CPT

## 2022-01-01 PROCEDURE — 87040 BLOOD CULTURE FOR BACTERIA: CPT | Performed by: EMERGENCY MEDICINE

## 2022-01-01 PROCEDURE — 80053 COMPREHEN METABOLIC PANEL: CPT | Performed by: EMERGENCY MEDICINE

## 2022-01-01 PROCEDURE — 25010000002 ONDANSETRON PER 1 MG: Performed by: EMERGENCY MEDICINE

## 2022-01-01 PROCEDURE — 71045 X-RAY EXAM CHEST 1 VIEW: CPT

## 2022-01-01 PROCEDURE — 87635 SARS-COV-2 COVID-19 AMP PRB: CPT | Performed by: EMERGENCY MEDICINE

## 2022-01-01 PROCEDURE — 96376 TX/PRO/DX INJ SAME DRUG ADON: CPT

## 2022-01-01 PROCEDURE — 25010000002 MORPHINE PER 10 MG: Performed by: EMERGENCY MEDICINE

## 2022-01-01 RX ORDER — SODIUM CHLORIDE 0.9 % (FLUSH) 0.9 %
10 SYRINGE (ML) INJECTION AS NEEDED
Status: DISCONTINUED | OUTPATIENT
Start: 2022-01-01 | End: 2022-01-01 | Stop reason: HOSPADM

## 2022-01-01 RX ORDER — ONDANSETRON 2 MG/ML
4 INJECTION INTRAMUSCULAR; INTRAVENOUS ONCE
Status: COMPLETED | OUTPATIENT
Start: 2022-01-01 | End: 2022-01-01

## 2022-01-01 RX ORDER — FENTANYL CITRATE 50 UG/ML
50 INJECTION, SOLUTION INTRAMUSCULAR; INTRAVENOUS ONCE
Status: COMPLETED | OUTPATIENT
Start: 2022-01-01 | End: 2022-01-01

## 2022-01-01 RX ADMIN — MORPHINE SULFATE 4 MG: 4 INJECTION INTRAVENOUS at 15:31

## 2022-01-01 RX ADMIN — FENTANYL CITRATE 50 MCG: 50 INJECTION, SOLUTION INTRAMUSCULAR; INTRAVENOUS at 19:03

## 2022-01-01 RX ADMIN — ONDANSETRON 4 MG: 2 INJECTION INTRAMUSCULAR; INTRAVENOUS at 10:19

## 2022-01-01 RX ADMIN — MORPHINE SULFATE 4 MG: 4 INJECTION, SOLUTION INTRAMUSCULAR; INTRAVENOUS at 10:33

## 2022-01-01 RX ADMIN — SODIUM CHLORIDE 500 ML: 9 INJECTION, SOLUTION INTRAVENOUS at 10:20

## 2022-01-03 ENCOUNTER — TELEPHONE (OUTPATIENT)
Dept: SURGERY | Age: 77
End: 2022-01-03

## 2022-01-04 ENCOUNTER — APPOINTMENT (OUTPATIENT)
Dept: GENERAL RADIOLOGY | Age: 77
DRG: 356 | End: 2022-01-04
Attending: SURGERY
Payer: OTHER GOVERNMENT

## 2022-01-04 ENCOUNTER — HOSPITAL ENCOUNTER (INPATIENT)
Age: 77
LOS: 5 days | DRG: 356 | End: 2022-01-09
Attending: SURGERY | Admitting: SURGERY
Payer: OTHER GOVERNMENT

## 2022-01-04 PROCEDURE — 1210000000 HC MED SURG R&B

## 2022-01-04 PROCEDURE — 74019 RADEX ABDOMEN 2 VIEWS: CPT

## 2022-01-04 PROCEDURE — 2580000003 HC RX 258: Performed by: SURGERY

## 2022-01-04 PROCEDURE — 6370000000 HC RX 637 (ALT 250 FOR IP): Performed by: SURGERY

## 2022-01-04 PROCEDURE — 6360000002 HC RX W HCPCS: Performed by: SURGERY

## 2022-01-04 RX ORDER — ACETAMINOPHEN 325 MG/1
650 TABLET ORAL EVERY 4 HOURS PRN
Status: DISCONTINUED | OUTPATIENT
Start: 2022-01-04 | End: 2022-01-10 | Stop reason: HOSPADM

## 2022-01-04 RX ORDER — ONDANSETRON 2 MG/ML
4 INJECTION INTRAMUSCULAR; INTRAVENOUS EVERY 6 HOURS PRN
Status: DISCONTINUED | OUTPATIENT
Start: 2022-01-04 | End: 2022-01-10 | Stop reason: HOSPADM

## 2022-01-04 RX ORDER — SODIUM CHLORIDE, SODIUM LACTATE, POTASSIUM CHLORIDE, CALCIUM CHLORIDE 600; 310; 30; 20 MG/100ML; MG/100ML; MG/100ML; MG/100ML
INJECTION, SOLUTION INTRAVENOUS CONTINUOUS
Status: DISCONTINUED | OUTPATIENT
Start: 2022-01-04 | End: 2022-01-10 | Stop reason: HOSPADM

## 2022-01-04 RX ORDER — HYDROMORPHONE HYDROCHLORIDE 1 MG/ML
0.5 INJECTION, SOLUTION INTRAMUSCULAR; INTRAVENOUS; SUBCUTANEOUS EVERY 4 HOURS PRN
Status: DISCONTINUED | OUTPATIENT
Start: 2022-01-04 | End: 2022-01-10 | Stop reason: HOSPADM

## 2022-01-04 RX ORDER — SIMETHICONE 80 MG
80 TABLET,CHEWABLE ORAL 2 TIMES DAILY
Status: DISCONTINUED | OUTPATIENT
Start: 2022-01-04 | End: 2022-01-10 | Stop reason: HOSPADM

## 2022-01-04 RX ADMIN — HYDROMORPHONE HYDROCHLORIDE 0.5 MG: 1 INJECTION, SOLUTION INTRAMUSCULAR; INTRAVENOUS; SUBCUTANEOUS at 22:57

## 2022-01-04 RX ADMIN — SODIUM CHLORIDE, POTASSIUM CHLORIDE, SODIUM LACTATE AND CALCIUM CHLORIDE: 600; 310; 30; 20 INJECTION, SOLUTION INTRAVENOUS at 22:50

## 2022-01-04 RX ADMIN — ONDANSETRON 4 MG: 2 INJECTION INTRAMUSCULAR; INTRAVENOUS at 22:57

## 2022-01-04 RX ADMIN — SIMETHICONE 80 MG: 80 TABLET, CHEWABLE ORAL at 23:15

## 2022-01-04 ASSESSMENT — PAIN - FUNCTIONAL ASSESSMENT: PAIN_FUNCTIONAL_ASSESSMENT: ACTIVITIES ARE NOT PREVENTED

## 2022-01-04 ASSESSMENT — PAIN SCALES - GENERAL
PAINLEVEL_OUTOF10: 10
PAINLEVEL_OUTOF10: 0

## 2022-01-04 ASSESSMENT — PAIN DESCRIPTION - LOCATION: LOCATION: ABDOMEN

## 2022-01-04 ASSESSMENT — PAIN DESCRIPTION - PAIN TYPE: TYPE: ACUTE PAIN

## 2022-01-04 ASSESSMENT — PAIN DESCRIPTION - FREQUENCY: FREQUENCY: CONTINUOUS

## 2022-01-04 ASSESSMENT — PAIN DESCRIPTION - DESCRIPTORS: DESCRIPTORS: ACHING;TIGHTNESS

## 2022-01-04 ASSESSMENT — PAIN DESCRIPTION - ONSET: ONSET: ON-GOING

## 2022-01-04 ASSESSMENT — PAIN DESCRIPTION - PROGRESSION: CLINICAL_PROGRESSION: NOT CHANGED

## 2022-01-05 ENCOUNTER — ANESTHESIA EVENT (OUTPATIENT)
Dept: OPERATING ROOM | Age: 77
DRG: 356 | End: 2022-01-05
Payer: OTHER GOVERNMENT

## 2022-01-05 ENCOUNTER — ANESTHESIA (OUTPATIENT)
Dept: OPERATING ROOM | Age: 77
DRG: 356 | End: 2022-01-05
Payer: OTHER GOVERNMENT

## 2022-01-05 VITALS — TEMPERATURE: 97.2 F | OXYGEN SATURATION: 97 %

## 2022-01-05 PROBLEM — K56.609 SMALL BOWEL OBSTRUCTION (HCC): Status: ACTIVE | Noted: 2022-01-05

## 2022-01-05 LAB
ALBUMIN SERPL-MCNC: 3.7 G/DL (ref 3.5–5.2)
ALP BLD-CCNC: 85 U/L (ref 40–130)
ALT SERPL-CCNC: 10 U/L (ref 5–41)
ANION GAP SERPL CALCULATED.3IONS-SCNC: 21 MMOL/L (ref 7–19)
AST SERPL-CCNC: 29 U/L (ref 5–40)
BASOPHILS ABSOLUTE: 0 K/UL (ref 0–0.2)
BASOPHILS RELATIVE PERCENT: 0.2 % (ref 0–1)
BILIRUB SERPL-MCNC: 0.3 MG/DL (ref 0.2–1.2)
BUN BLDV-MCNC: 109 MG/DL (ref 8–23)
CALCIUM SERPL-MCNC: 9.1 MG/DL (ref 8.8–10.2)
CHLORIDE BLD-SCNC: 106 MMOL/L (ref 98–111)
CO2: 13 MMOL/L (ref 22–29)
CREAT SERPL-MCNC: 9.3 MG/DL (ref 0.5–1.2)
EOSINOPHILS ABSOLUTE: 0 K/UL (ref 0–0.6)
EOSINOPHILS RELATIVE PERCENT: 0.7 % (ref 0–5)
GFR AFRICAN AMERICAN: 7
GFR NON-AFRICAN AMERICAN: 6
GLUCOSE BLD-MCNC: 115 MG/DL (ref 74–109)
HCT VFR BLD CALC: 34.8 % (ref 42–52)
HEMOGLOBIN: 11 G/DL (ref 14–18)
IMMATURE GRANULOCYTES #: 0 K/UL
LYMPHOCYTES ABSOLUTE: 0.8 K/UL (ref 1.1–4.5)
LYMPHOCYTES RELATIVE PERCENT: 13 % (ref 20–40)
MCH RBC QN AUTO: 31.2 PG (ref 27–31)
MCHC RBC AUTO-ENTMCNC: 31.6 G/DL (ref 33–37)
MCV RBC AUTO: 98.6 FL (ref 80–94)
MONOCYTES ABSOLUTE: 0.7 K/UL (ref 0–0.9)
MONOCYTES RELATIVE PERCENT: 11.8 % (ref 0–10)
NEUTROPHILS ABSOLUTE: 4.3 K/UL (ref 1.5–7.5)
NEUTROPHILS RELATIVE PERCENT: 74 % (ref 50–65)
PDW BLD-RTO: 15.3 % (ref 11.5–14.5)
PLATELET # BLD: 318 K/UL (ref 130–400)
PMV BLD AUTO: 10 FL (ref 9.4–12.4)
POTASSIUM SERPL-SCNC: 5.6 MMOL/L (ref 3.5–5)
RBC # BLD: 3.53 M/UL (ref 4.7–6.1)
SODIUM BLD-SCNC: 140 MMOL/L (ref 136–145)
TOTAL PROTEIN: 6.3 G/DL (ref 6.6–8.7)
WBC # BLD: 5.8 K/UL (ref 4.8–10.8)

## 2022-01-05 PROCEDURE — 7100000000 HC PACU RECOVERY - FIRST 15 MIN: Performed by: SURGERY

## 2022-01-05 PROCEDURE — 2500000003 HC RX 250 WO HCPCS: Performed by: SURGERY

## 2022-01-05 PROCEDURE — C9290 INJ, BUPIVACAINE LIPOSOME: HCPCS | Performed by: SURGERY

## 2022-01-05 PROCEDURE — 6360000002 HC RX W HCPCS: Performed by: SURGERY

## 2022-01-05 PROCEDURE — 3600000014 HC SURGERY LEVEL 4 ADDTL 15MIN: Performed by: SURGERY

## 2022-01-05 PROCEDURE — 3600000004 HC SURGERY LEVEL 4 BASE: Performed by: SURGERY

## 2022-01-05 PROCEDURE — 7100000001 HC PACU RECOVERY - ADDTL 15 MIN: Performed by: SURGERY

## 2022-01-05 PROCEDURE — 85025 COMPLETE CBC W/AUTO DIFF WBC: CPT

## 2022-01-05 PROCEDURE — 88300 SURGICAL PATH GROSS: CPT

## 2022-01-05 PROCEDURE — 6370000000 HC RX 637 (ALT 250 FOR IP): Performed by: SURGERY

## 2022-01-05 PROCEDURE — 2709999900 HC NON-CHARGEABLE SUPPLY: Performed by: SURGERY

## 2022-01-05 PROCEDURE — 02HV33Z INSERTION OF INFUSION DEVICE INTO SUPERIOR VENA CAVA, PERCUTANEOUS APPROACH: ICD-10-PCS | Performed by: ORTHOPAEDIC SURGERY

## 2022-01-05 PROCEDURE — 2140000000 HC CCU INTERMEDIATE R&B

## 2022-01-05 PROCEDURE — 6360000002 HC RX W HCPCS: Performed by: ANESTHESIOLOGY

## 2022-01-05 PROCEDURE — 3700000000 HC ANESTHESIA ATTENDED CARE: Performed by: SURGERY

## 2022-01-05 PROCEDURE — 2580000003 HC RX 258: Performed by: SURGERY

## 2022-01-05 PROCEDURE — 0WPF0JZ REMOVAL OF SYNTHETIC SUBSTITUTE FROM ABDOMINAL WALL, OPEN APPROACH: ICD-10-PCS | Performed by: CHIROPRACTOR

## 2022-01-05 PROCEDURE — 2580000003 HC RX 258: Performed by: ANESTHESIOLOGY

## 2022-01-05 PROCEDURE — 99223 1ST HOSP IP/OBS HIGH 75: CPT | Performed by: SURGERY

## 2022-01-05 PROCEDURE — 3700000001 HC ADD 15 MINUTES (ANESTHESIA): Performed by: SURGERY

## 2022-01-05 PROCEDURE — 2700000000 HC OXYGEN THERAPY PER DAY

## 2022-01-05 PROCEDURE — 6360000002 HC RX W HCPCS: Performed by: NURSE ANESTHETIST, CERTIFIED REGISTERED

## 2022-01-05 PROCEDURE — 2720000010 HC SURG SUPPLY STERILE: Performed by: SURGERY

## 2022-01-05 PROCEDURE — 2500000003 HC RX 250 WO HCPCS: Performed by: INTERNAL MEDICINE

## 2022-01-05 PROCEDURE — 2580000003 HC RX 258: Performed by: INTERNAL MEDICINE

## 2022-01-05 PROCEDURE — 36415 COLL VENOUS BLD VENIPUNCTURE: CPT

## 2022-01-05 PROCEDURE — 2500000003 HC RX 250 WO HCPCS: Performed by: NURSE ANESTHETIST, CERTIFIED REGISTERED

## 2022-01-05 PROCEDURE — 80053 COMPREHEN METABOLIC PANEL: CPT

## 2022-01-05 PROCEDURE — 8010000000 HC HEMODIALYSIS ACUTE INPT

## 2022-01-05 RX ORDER — SODIUM CHLORIDE, SODIUM LACTATE, POTASSIUM CHLORIDE, CALCIUM CHLORIDE 600; 310; 30; 20 MG/100ML; MG/100ML; MG/100ML; MG/100ML
INJECTION, SOLUTION INTRAVENOUS CONTINUOUS
Status: DISCONTINUED | OUTPATIENT
Start: 2022-01-05 | End: 2022-01-07

## 2022-01-05 RX ORDER — VITAMIN E 268 MG
400 CAPSULE ORAL DAILY
COMMUNITY

## 2022-01-05 RX ORDER — GLYCOPYRROLATE 0.2 MG/ML
INJECTION INTRAMUSCULAR; INTRAVENOUS PRN
Status: DISCONTINUED | OUTPATIENT
Start: 2022-01-05 | End: 2022-01-05 | Stop reason: SDUPTHER

## 2022-01-05 RX ORDER — SODIUM CHLORIDE 0.9 % (FLUSH) 0.9 %
5-40 SYRINGE (ML) INJECTION EVERY 12 HOURS SCHEDULED
Status: DISCONTINUED | OUTPATIENT
Start: 2022-01-05 | End: 2022-01-10 | Stop reason: HOSPADM

## 2022-01-05 RX ORDER — HYDROMORPHONE HYDROCHLORIDE 1 MG/ML
0.5 INJECTION, SOLUTION INTRAMUSCULAR; INTRAVENOUS; SUBCUTANEOUS EVERY 5 MIN PRN
Status: COMPLETED | OUTPATIENT
Start: 2022-01-05 | End: 2022-01-05

## 2022-01-05 RX ORDER — DILTIAZEM HYDROCHLORIDE 120 MG/1
120 CAPSULE, EXTENDED RELEASE ORAL DAILY
Status: DISCONTINUED | OUTPATIENT
Start: 2022-01-05 | End: 2022-01-05 | Stop reason: SDUPTHER

## 2022-01-05 RX ORDER — ASPIRIN 81 MG/1
81 TABLET, CHEWABLE ORAL DAILY
Status: DISCONTINUED | OUTPATIENT
Start: 2022-01-05 | End: 2022-01-10 | Stop reason: HOSPADM

## 2022-01-05 RX ORDER — HYDROMORPHONE HYDROCHLORIDE 1 MG/ML
0.5 INJECTION, SOLUTION INTRAMUSCULAR; INTRAVENOUS; SUBCUTANEOUS
Status: DISCONTINUED | OUTPATIENT
Start: 2022-01-05 | End: 2022-01-10 | Stop reason: HOSPADM

## 2022-01-05 RX ORDER — HYDROMORPHONE HYDROCHLORIDE 1 MG/ML
0.25 INJECTION, SOLUTION INTRAMUSCULAR; INTRAVENOUS; SUBCUTANEOUS EVERY 5 MIN PRN
Status: DISCONTINUED | OUTPATIENT
Start: 2022-01-05 | End: 2022-01-05 | Stop reason: HOSPADM

## 2022-01-05 RX ORDER — ONDANSETRON 4 MG/1
4 TABLET, ORALLY DISINTEGRATING ORAL EVERY 8 HOURS PRN
Status: DISCONTINUED | OUTPATIENT
Start: 2022-01-05 | End: 2022-01-10 | Stop reason: HOSPADM

## 2022-01-05 RX ORDER — LABETALOL 20 MG/4 ML (5 MG/ML) INTRAVENOUS SYRINGE
PRN
Status: DISCONTINUED | OUTPATIENT
Start: 2022-01-05 | End: 2022-01-05 | Stop reason: SDUPTHER

## 2022-01-05 RX ORDER — DULOXETIN HYDROCHLORIDE 60 MG/1
60 CAPSULE, DELAYED RELEASE ORAL DAILY
Status: DISCONTINUED | OUTPATIENT
Start: 2022-01-05 | End: 2022-01-10 | Stop reason: HOSPADM

## 2022-01-05 RX ORDER — ASCORBIC ACID 500 MG
500 TABLET ORAL 2 TIMES DAILY
Status: DISCONTINUED | OUTPATIENT
Start: 2022-01-05 | End: 2022-01-10 | Stop reason: HOSPADM

## 2022-01-05 RX ORDER — SODIUM CHLORIDE 0.9 % (FLUSH) 0.9 %
10 SYRINGE (ML) INJECTION EVERY 12 HOURS SCHEDULED
Status: DISCONTINUED | OUTPATIENT
Start: 2022-01-05 | End: 2022-01-06 | Stop reason: SDUPTHER

## 2022-01-05 RX ORDER — ALLOPURINOL 100 MG/1
200 TABLET ORAL DAILY
Status: DISCONTINUED | OUTPATIENT
Start: 2022-01-05 | End: 2022-01-10 | Stop reason: HOSPADM

## 2022-01-05 RX ORDER — ISOSORBIDE MONONITRATE 60 MG/1
120 TABLET, EXTENDED RELEASE ORAL DAILY
Status: DISCONTINUED | OUTPATIENT
Start: 2022-01-05 | End: 2022-01-10 | Stop reason: HOSPADM

## 2022-01-05 RX ORDER — ASCORBIC ACID 500 MG
500 TABLET ORAL 2 TIMES DAILY
COMMUNITY

## 2022-01-05 RX ORDER — SODIUM CHLORIDE 9 MG/ML
25 INJECTION, SOLUTION INTRAVENOUS PRN
Status: DISCONTINUED | OUTPATIENT
Start: 2022-01-05 | End: 2022-01-06 | Stop reason: SDUPTHER

## 2022-01-05 RX ORDER — NEOSTIGMINE METHYLSULFATE 1 MG/ML
INJECTION, SOLUTION INTRAVENOUS PRN
Status: DISCONTINUED | OUTPATIENT
Start: 2022-01-05 | End: 2022-01-05 | Stop reason: SDUPTHER

## 2022-01-05 RX ORDER — BUPIVACAINE HYDROCHLORIDE 2.5 MG/ML
INJECTION, SOLUTION INFILTRATION; PERINEURAL PRN
Status: DISCONTINUED | OUTPATIENT
Start: 2022-01-05 | End: 2022-01-05 | Stop reason: ALTCHOICE

## 2022-01-05 RX ORDER — SODIUM CHLORIDE 0.9 % (FLUSH) 0.9 %
10 SYRINGE (ML) INJECTION PRN
Status: DISCONTINUED | OUTPATIENT
Start: 2022-01-05 | End: 2022-01-06 | Stop reason: SDUPTHER

## 2022-01-05 RX ORDER — LIDOCAINE HYDROCHLORIDE 10 MG/ML
INJECTION, SOLUTION INFILTRATION; PERINEURAL PRN
Status: DISCONTINUED | OUTPATIENT
Start: 2022-01-05 | End: 2022-01-05 | Stop reason: SDUPTHER

## 2022-01-05 RX ORDER — DILTIAZEM HYDROCHLORIDE 5 MG/ML
10 INJECTION INTRAVENOUS ONCE
Status: COMPLETED | OUTPATIENT
Start: 2022-01-05 | End: 2022-01-05

## 2022-01-05 RX ORDER — OXYCODONE HYDROCHLORIDE 5 MG/1
5 TABLET ORAL EVERY 4 HOURS PRN
Status: DISCONTINUED | OUTPATIENT
Start: 2022-01-05 | End: 2022-01-10 | Stop reason: HOSPADM

## 2022-01-05 RX ORDER — ONDANSETRON 2 MG/ML
INJECTION INTRAMUSCULAR; INTRAVENOUS PRN
Status: DISCONTINUED | OUTPATIENT
Start: 2022-01-05 | End: 2022-01-05 | Stop reason: SDUPTHER

## 2022-01-05 RX ORDER — CLONIDINE HYDROCHLORIDE 0.1 MG/1
0.1 TABLET ORAL PRN
Status: DISCONTINUED | OUTPATIENT
Start: 2022-01-05 | End: 2022-01-10 | Stop reason: HOSPADM

## 2022-01-05 RX ORDER — LISINOPRIL 40 MG/1
40 TABLET ORAL DAILY
Status: ON HOLD | COMMUNITY
End: 2022-01-06

## 2022-01-05 RX ORDER — HYDRALAZINE HYDROCHLORIDE 100 MG/1
100 TABLET, FILM COATED ORAL 2 TIMES DAILY
Status: ON HOLD | COMMUNITY
End: 2022-01-06

## 2022-01-05 RX ORDER — FENTANYL CITRATE 50 UG/ML
INJECTION, SOLUTION INTRAMUSCULAR; INTRAVENOUS PRN
Status: DISCONTINUED | OUTPATIENT
Start: 2022-01-05 | End: 2022-01-05 | Stop reason: SDUPTHER

## 2022-01-05 RX ORDER — ATRACURIUM BESYLATE 10 MG/ML
INJECTION, SOLUTION INTRAVENOUS PRN
Status: DISCONTINUED | OUTPATIENT
Start: 2022-01-05 | End: 2022-01-05 | Stop reason: SDUPTHER

## 2022-01-05 RX ORDER — SODIUM CHLORIDE 9 MG/ML
25 INJECTION, SOLUTION INTRAVENOUS PRN
Status: DISCONTINUED | OUTPATIENT
Start: 2022-01-05 | End: 2022-01-10 | Stop reason: HOSPADM

## 2022-01-05 RX ORDER — RANOLAZINE 500 MG/1
500 TABLET, EXTENDED RELEASE ORAL 2 TIMES DAILY
Status: DISCONTINUED | OUTPATIENT
Start: 2022-01-05 | End: 2022-01-10 | Stop reason: HOSPADM

## 2022-01-05 RX ORDER — CALCITRIOL 0.25 UG/1
0.5 CAPSULE, LIQUID FILLED ORAL DAILY
Status: DISCONTINUED | OUTPATIENT
Start: 2022-01-05 | End: 2022-01-10 | Stop reason: HOSPADM

## 2022-01-05 RX ORDER — CARVEDILOL 3.12 MG/1
3.12 TABLET ORAL 2 TIMES DAILY WITH MEALS
Status: DISCONTINUED | OUTPATIENT
Start: 2022-01-05 | End: 2022-01-06

## 2022-01-05 RX ORDER — DILTIAZEM HYDROCHLORIDE 120 MG/1
120 CAPSULE, COATED, EXTENDED RELEASE ORAL DAILY
Status: DISCONTINUED | OUTPATIENT
Start: 2022-01-05 | End: 2022-01-08

## 2022-01-05 RX ORDER — DILTIAZEM HYDROCHLORIDE 120 MG/1
120 CAPSULE, EXTENDED RELEASE ORAL DAILY
COMMUNITY

## 2022-01-05 RX ORDER — FENTANYL CITRATE 50 UG/ML
50 INJECTION, SOLUTION INTRAMUSCULAR; INTRAVENOUS EVERY 5 MIN PRN
Status: DISCONTINUED | OUTPATIENT
Start: 2022-01-05 | End: 2022-01-05 | Stop reason: HOSPADM

## 2022-01-05 RX ORDER — SODIUM CHLORIDE 0.9 % (FLUSH) 0.9 %
5-40 SYRINGE (ML) INJECTION PRN
Status: DISCONTINUED | OUTPATIENT
Start: 2022-01-05 | End: 2022-01-10 | Stop reason: HOSPADM

## 2022-01-05 RX ORDER — FERROUS SULFATE 325(65) MG
325 TABLET ORAL
Status: DISCONTINUED | OUTPATIENT
Start: 2022-01-05 | End: 2022-01-10 | Stop reason: HOSPADM

## 2022-01-05 RX ORDER — ASPIRIN 81 MG/1
81 TABLET ORAL DAILY
COMMUNITY

## 2022-01-05 RX ORDER — DEXAMETHASONE SODIUM PHOSPHATE 10 MG/ML
INJECTION, SOLUTION INTRAMUSCULAR; INTRAVENOUS PRN
Status: DISCONTINUED | OUTPATIENT
Start: 2022-01-05 | End: 2022-01-05 | Stop reason: SDUPTHER

## 2022-01-05 RX ORDER — ONDANSETRON 2 MG/ML
4 INJECTION INTRAMUSCULAR; INTRAVENOUS
Status: DISCONTINUED | OUTPATIENT
Start: 2022-01-05 | End: 2022-01-05 | Stop reason: HOSPADM

## 2022-01-05 RX ORDER — HEPARIN SODIUM 5000 [USP'U]/ML
5000 INJECTION, SOLUTION INTRAVENOUS; SUBCUTANEOUS EVERY 8 HOURS SCHEDULED
Status: DISCONTINUED | OUTPATIENT
Start: 2022-01-05 | End: 2022-01-10 | Stop reason: HOSPADM

## 2022-01-05 RX ORDER — ONDANSETRON 2 MG/ML
4 INJECTION INTRAMUSCULAR; INTRAVENOUS EVERY 6 HOURS PRN
Status: DISCONTINUED | OUTPATIENT
Start: 2022-01-05 | End: 2022-01-10 | Stop reason: HOSPADM

## 2022-01-05 RX ORDER — PROPOFOL 10 MG/ML
INJECTION, EMULSION INTRAVENOUS PRN
Status: DISCONTINUED | OUTPATIENT
Start: 2022-01-05 | End: 2022-01-05 | Stop reason: SDUPTHER

## 2022-01-05 RX ORDER — CLONIDINE 0.2 MG/24H
1 PATCH, EXTENDED RELEASE TRANSDERMAL WEEKLY
COMMUNITY

## 2022-01-05 RX ADMIN — HYDROMORPHONE HYDROCHLORIDE 0.5 MG: 1 INJECTION, SOLUTION INTRAMUSCULAR; INTRAVENOUS; SUBCUTANEOUS at 10:47

## 2022-01-05 RX ADMIN — HYDROMORPHONE HYDROCHLORIDE 0.5 MG: 1 INJECTION, SOLUTION INTRAMUSCULAR; INTRAVENOUS; SUBCUTANEOUS at 05:22

## 2022-01-05 RX ADMIN — GLYCOPYRROLATE 0.6 MG: 0.2 INJECTION, SOLUTION INTRAMUSCULAR; INTRAVENOUS at 09:29

## 2022-01-05 RX ADMIN — DILTIAZEM HYDROCHLORIDE 10 MG: 5 INJECTION INTRAVENOUS at 22:14

## 2022-01-05 RX ADMIN — CARVEDILOL 3.12 MG: 3.12 TABLET, FILM COATED ORAL at 16:59

## 2022-01-05 RX ADMIN — ATRACURIUM BESYLATE 20 MG: 100 INJECTION, SOLUTION INTRAVENOUS at 08:34

## 2022-01-05 RX ADMIN — HYDROMORPHONE HYDROCHLORIDE 0.5 MG: 1 INJECTION, SOLUTION INTRAMUSCULAR; INTRAVENOUS; SUBCUTANEOUS at 20:20

## 2022-01-05 RX ADMIN — HYDROMORPHONE HYDROCHLORIDE 0.5 MG: 1 INJECTION, SOLUTION INTRAMUSCULAR; INTRAVENOUS; SUBCUTANEOUS at 12:31

## 2022-01-05 RX ADMIN — LABETALOL 20 MG/4 ML (5 MG/ML) INTRAVENOUS SYRINGE 5 MG: at 08:42

## 2022-01-05 RX ADMIN — SODIUM CHLORIDE, PRESERVATIVE FREE 10 ML: 5 INJECTION INTRAVENOUS at 22:53

## 2022-01-05 RX ADMIN — HYDROMORPHONE HYDROCHLORIDE 0.5 MG: 1 INJECTION, SOLUTION INTRAMUSCULAR; INTRAVENOUS; SUBCUTANEOUS at 10:28

## 2022-01-05 RX ADMIN — HYDROMORPHONE HYDROCHLORIDE 0.5 MG: 1 INJECTION, SOLUTION INTRAMUSCULAR; INTRAVENOUS; SUBCUTANEOUS at 10:11

## 2022-01-05 RX ADMIN — FENTANYL CITRATE 50 MCG: 50 INJECTION, SOLUTION INTRAMUSCULAR; INTRAVENOUS at 08:16

## 2022-01-05 RX ADMIN — ATRACURIUM BESYLATE 30 MG: 100 INJECTION, SOLUTION INTRAVENOUS at 08:17

## 2022-01-05 RX ADMIN — FENTANYL CITRATE 50 MCG: 50 INJECTION, SOLUTION INTRAMUSCULAR; INTRAVENOUS at 08:44

## 2022-01-05 RX ADMIN — DILTIAZEM HYDROCHLORIDE 5 MG/HR: 5 INJECTION INTRAVENOUS at 22:53

## 2022-01-05 RX ADMIN — ISOSORBIDE MONONITRATE 120 MG: 60 TABLET, EXTENDED RELEASE ORAL at 14:56

## 2022-01-05 RX ADMIN — HYDROMORPHONE HYDROCHLORIDE 0.5 MG: 1 INJECTION, SOLUTION INTRAMUSCULAR; INTRAVENOUS; SUBCUTANEOUS at 10:37

## 2022-01-05 RX ADMIN — NEOSTIGMINE 4 MG: 1 INJECTION INTRAVENOUS at 09:29

## 2022-01-05 RX ADMIN — FENTANYL CITRATE 50 MCG: 50 INJECTION INTRAMUSCULAR; INTRAVENOUS at 10:51

## 2022-01-05 RX ADMIN — HYDROMORPHONE HYDROCHLORIDE 0.5 MG: 1 INJECTION, SOLUTION INTRAMUSCULAR; INTRAVENOUS; SUBCUTANEOUS at 16:58

## 2022-01-05 RX ADMIN — DILTIAZEM HYDROCHLORIDE 120 MG: 120 CAPSULE, COATED, EXTENDED RELEASE ORAL at 14:55

## 2022-01-05 RX ADMIN — LIDOCAINE HYDROCHLORIDE 50 MG: 10 INJECTION, SOLUTION INFILTRATION; PERINEURAL at 08:16

## 2022-01-05 RX ADMIN — HYDROMORPHONE HYDROCHLORIDE 0.5 MG: 1 INJECTION, SOLUTION INTRAMUSCULAR; INTRAVENOUS; SUBCUTANEOUS at 23:41

## 2022-01-05 RX ADMIN — FENTANYL CITRATE 25 MCG: 50 INJECTION, SOLUTION INTRAMUSCULAR; INTRAVENOUS at 09:01

## 2022-01-05 RX ADMIN — FERROUS SULFATE TAB 325 MG (65 MG ELEMENTAL FE) 325 MG: 325 (65 FE) TAB at 16:59

## 2022-01-05 RX ADMIN — PROPOFOL 150 MG: 10 INJECTION, EMULSION INTRAVENOUS at 08:16

## 2022-01-05 RX ADMIN — FENTANYL CITRATE 50 MCG: 50 INJECTION, SOLUTION INTRAMUSCULAR; INTRAVENOUS at 08:38

## 2022-01-05 RX ADMIN — PIPERACILLIN AND TAZOBACTAM 3375 MG: 3; .375 INJECTION, POWDER, LYOPHILIZED, FOR SOLUTION INTRAVENOUS at 08:44

## 2022-01-05 RX ADMIN — ONDANSETRON 4 MG: 2 INJECTION INTRAMUSCULAR; INTRAVENOUS at 09:08

## 2022-01-05 RX ADMIN — DEXAMETHASONE SODIUM PHOSPHATE 4 MG: 10 INJECTION, SOLUTION INTRAMUSCULAR; INTRAVENOUS at 08:36

## 2022-01-05 ASSESSMENT — PAIN - FUNCTIONAL ASSESSMENT: PAIN_FUNCTIONAL_ASSESSMENT: ACTIVITIES ARE NOT PREVENTED

## 2022-01-05 ASSESSMENT — PAIN DESCRIPTION - LOCATION: LOCATION: ABDOMEN

## 2022-01-05 ASSESSMENT — PAIN DESCRIPTION - PAIN TYPE: TYPE: ACUTE PAIN

## 2022-01-05 ASSESSMENT — PAIN SCALES - GENERAL
PAINLEVEL_OUTOF10: 8
PAINLEVEL_OUTOF10: 9
PAINLEVEL_OUTOF10: 8
PAINLEVEL_OUTOF10: 7
PAINLEVEL_OUTOF10: 9
PAINLEVEL_OUTOF10: 7
PAINLEVEL_OUTOF10: 8
PAINLEVEL_OUTOF10: 8
PAINLEVEL_OUTOF10: 7
PAINLEVEL_OUTOF10: 8

## 2022-01-05 ASSESSMENT — ENCOUNTER SYMPTOMS
RESPIRATORY NEGATIVE: 1
ABDOMINAL DISTENTION: 1
ABDOMINAL PAIN: 1

## 2022-01-05 ASSESSMENT — PAIN DESCRIPTION - PROGRESSION: CLINICAL_PROGRESSION: NOT CHANGED

## 2022-01-05 ASSESSMENT — PAIN DESCRIPTION - DESCRIPTORS: DESCRIPTORS: TIGHTNESS;THROBBING

## 2022-01-05 ASSESSMENT — LIFESTYLE VARIABLES: SMOKING_STATUS: 0

## 2022-01-05 ASSESSMENT — PAIN DESCRIPTION - ONSET: ONSET: ON-GOING

## 2022-01-05 ASSESSMENT — PAIN DESCRIPTION - FREQUENCY: FREQUENCY: CONTINUOUS

## 2022-01-05 NOTE — ANESTHESIA PRE PROCEDURE
Department of Anesthesiology  Preprocedure Note       Name:  Epifanio Holguin Sr.   Age:  77 y.o.  :  1945                                          MRN:  [id number]         Date:  2022      Surgeon: Vianca Dangelo):  [address] [place] [address],     Procedure: Procedure(s):  LAPAROTOMY EXPLORATORY    Medications prior to admission:   Prior to Admission medications    Medication Sig Start Date End Date Taking?  Authorizing Provider   Multiple Vitamins-Minerals (THERAPEUTIC MULTIVITAMIN-MINERALS) tablet Take 1 tablet by mouth daily    Historical Provider, MD   carvedilol (COREG) 3.125 MG tablet Take 3.125 mg by mouth 2 times daily (with meals)    Historical Provider, MD   dilTIAZem (CARDIZEM 12 HR) 120 MG extended release capsule Take 120 mg by mouth daily     Historical Provider, MD   allopurinol (ZYLOPRIM) 100 MG tablet Take 200 mg by mouth daily  21   Historical Provider, MD   aspirin 81 MG chewable tablet Take 1 tablet by mouth daily  21   Historical Provider, MD   cloNIDine (CATAPRES) 0.1 MG tablet Take 1 tablet by mouth as needed for High Blood Pressure  21   Historical Provider, MD   isosorbide mononitrate (IMDUR) 120 MG extended release tablet Take 1 tablet by mouth daily  21   Historical Provider, MD   nitroGLYCERIN (NITROSTAT) 0.4 MG SL tablet Place 1 tablet under the tongue every 5 minutes as needed  21   Historical Provider, MD   ranolazine (RANEXA) 500 MG extended release tablet Take 1 tablet by mouth 2 times daily  21   Historical Provider, MD   rosuvastatin (CRESTOR) 40 MG tablet Take 20 mg by mouth daily  21   Historical Provider, MD   DULoxetine HCl 60 MG CSDR Take 60 mg by mouth daily  21   Historical Provider, MD   calcitRIOL (ROCALTROL) 0.5 MCG capsule Take 0.5 mcg by mouth daily   Patient not taking: Reported on 2021   Historical Provider, MD   meclizine (ANTIVERT) 25 MG CHEW Take 25 mg by mouth 2 times daily as needed  21   Historical Provider, MD sodium bicarbonate 325 MG tablet Take 1 tablet by mouth 3 times daily  7/19/21   Historical Provider, MD   clopidogrel (PLAVIX) 75 MG tablet Take 1 tablet by mouth 7/19/21   Historical Provider, MD   ferrous sulfate (IRON 325) 325 (65 Fe) MG tablet Take 1 tablet by mouth 3 times daily (with meals)  7/29/21   Historical Provider, MD   Methoxy PEG-Epoetin Beta (MIRCERA IJ) 75 mcg  Patient not taking: Reported on 12/17/2021 7/23/21 7/22/22  Historical Provider, MD   Tuberculin PPD (TUBERSOL ID) Inject 0.1 mLs into the skin  Patient not taking: Reported on 12/17/2021 7/19/21   Historical Provider, MD   ascorbic acid (VITAMIN C) 500 MG tablet Take 1 tablet by mouth 7/19/21   Historical Provider, MD   Ascorbic Acid  MG CPCR Take 500 mg by mouth 2 times daily    Historical Provider, MD   Calcium-Mag-Vit C-Vit D 854-10- PACK Take 1 tablet by mouth daily    Historical Provider, MD   vitamin D 25 MCG (1000 UT) CAPS Take by mouth 7/29/21   Historical Provider, MD   vitamin E 1000 units capsule Take 400 Units by mouth daily     Historical Provider, MD       Current medications:    No current facility-administered medications for this visit. No current outpatient medications on file.      Facility-Administered Medications Ordered in Other Visits   Medication Dose Route Frequency Provider Last Rate Last Admin    lactated ringers infusion   IntraVENous Continuous Yue Wang  mL/hr at 01/05/22 0743 NoRateChange at 01/05/22 0743    ondansetron (ZOFRAN) injection 4 mg  4 mg IntraVENous Q6H PRN Yue Wang MD   4 mg at 01/04/22 2257    acetaminophen (TYLENOL) tablet 650 mg  650 mg Oral Q4H PRN Yue Wang MD        HYDROmorphone HCl PF (DILAUDID) injection 0.5 mg  0.5 mg IntraVENous Q4H PRN Yue Wang MD   0.5 mg at 01/05/22 0522    simethicone (MYLICON) chewable tablet 80 mg  80 mg Oral BID Yue Wang MD   80 mg at 01/04/22 7042       Allergies:  No Known Allergies    Problem List:  There is no problem list on file for this patient. Past Medical History:        Diagnosis Date    CAD (coronary artery disease)     Chronic kidney disease     [facility] mellitus ([facility] [address]     Heart disease     Hemodialysis patient ([facility] [address]     Hyperlipidemia     Hypertension        Past Surgical History:        Procedure Laterality Date    CORONARY ANGIOPLASTY WITH STENT PLACEMENT      UMBILICAL HERNIA REPAIR      UMBILICAL HERNIA REPAIR N/A 12/29/2021    LAPAROSCOPIC PRIMARY EPIGASTRIC HERNIA REPAIR WITH MESH performed by Erica Kohli DO at [facility] OR       Social History:    Social History     Tobacco Use    Smoking status: Former Smoker    Smokeless tobacco: Never Used   Substance Use Topics    Alcohol use: Never                                Counseling given: Not Answered      Vital Signs (Current): There were no vitals filed for this visit.                                            BP Readings from Last 3 Encounters:   01/05/22 (!) 167/110   12/29/21 (!) 145/88   12/29/21 (!) 181/107       NPO Status:                                                                                 BMI:   Wt Readings from Last 3 Encounters:   01/05/22 154 lb 9.6 oz (70.1 kg)   12/29/21 146 lb (66.2 kg)   12/17/21 145 lb 3.2 oz (65.9 kg)     There is no height or weight on file to calculate BMI.    CBC:   Lab Results   Component Value Date    WBC 5.8 01/05/2022    RBC 3.53 01/05/2022    HGB 11.0 01/05/2022    HCT 34.8 01/05/2022    MCV 98.6 01/05/2022    RDW 15.3 01/05/2022     01/05/2022       CMP:   Lab Results   Component Value Date     01/05/2022    K 5.6 01/05/2022     01/05/2022    CO2 13 01/05/2022     01/05/2022    CREATININE 9.3 01/05/2022    GFRAA 7 01/05/2022    LABGLOM 6 01/05/2022    GLUCOSE 115 01/05/2022    PROT 6.3 01/05/2022    CALCIUM 9.1 01/05/2022    BILITOT 0.3 01/05/2022    ALKPHOS 85 01/05/2022    AST 29 01/05/2022    ALT 10 01/05/2022 POC Tests: No results for input(s): POCGLU, POCNA, POCK, POCCL, POCBUN, POCHEMO, POCHCT in the last 72 hours. Coags: No results found for: PROTIME, INR, APTT    HCG (If Applicable): No results found for: PREGTESTUR, PREGSERUM, HCG, HCGQUANT     ABGs: No results found for: PHART, PO2ART, PQV3VVD, HVN9ISK, BEART, Q2QTMAVE     Type & Screen (If Applicable):  No results found for: LABABO, LABRH    Drug/Infectious Status (If Applicable):  No results found for: HIV, HEPCAB    COVID-19 Screening (If Applicable): No results found for: COVID19        Anesthesia Evaluation  Patient summary reviewed no history of anesthetic complications:   Airway: Mallampati: III  TM distance: >3 FB   Neck ROM: limited  Mouth opening: < 3 FB Dental:    (+) edentulous      Pulmonary:normal exam  breath sounds clear to auscultation      (-) asthma, sleep apnea and not a current smoker          Patient did not smoke on day of surgery. Cardiovascular:  Exercise tolerance: good (>4 METS),   (+) hypertension:, past MI:, CABG/stent (Multiple stents): no interval change, dysrhythmias: SVT, CHF: systolic, hyperlipidemia    (-) pacemaker    ECG reviewed  Rhythm: regular  Rate: abnormal  Echocardiogram reviewed  Stress test reviewed       Beta Blocker:  Dose within 24 Hrs        PE comment: Tachycardic   Neuro/Psych:      (-) seizures, TIA and CVA           GI/Hepatic/Renal:   (+) renal disease (Patient has not gone to dialysis in two weeks): dialysis and ESRD,      (-) GERD and liver disease       Endo/Other:    (+) DiabetesType II DM, , blood dyscrasia (Clopidogrel, last taken 1/3/22)::., no malignancy/cancer. (-) no malignancy/cancer               Abdominal:             Vascular:     - DVT and PE. Other Findings: Dialysis line right upper chest wall              Anesthesia Plan      general     ASA 4 - emergent     (Discussed increased risk of MACE)  Induction: intravenous.     MIPS: Postoperative opioids intended and Prophylactic antiemetics administered. Anesthetic plan and risks discussed with patient. Use of blood products discussed with patient whom consented to blood products.                  Uyen Connolly MD   1/5/2022

## 2022-01-05 NOTE — PROGRESS NOTES
Pt consent signed and patient stated he is agreeable to dialysis today. Family accompanying patient.

## 2022-01-05 NOTE — ANESTHESIA PROCEDURE NOTES
Arterial Line:    An arterial line was placed in the holding area for the following indication(s): continuous blood pressure monitoring. A 20 gauge (size), 1 and 3/4 inch (length), Arrow (type) catheter was placed, Seldinger technique used, into the right radial artery, secured by tape and Tegaderm. Anesthesia type: Local  Local infiltration: Injection    Events:  patient tolerated procedure well with no complications.   1/5/2022 8:05 AM1/5/2022 8:07 AM  Anesthesiologist: Jozef Willingham MD  Preanesthetic Checklist  Completed: patient identified, IV checked, site marked, risks and benefits discussed, surgical consent, monitors and equipment checked, pre-op evaluation, timeout performed, anesthesia consent given, oxygen available and patient being monitored

## 2022-01-05 NOTE — CONSULTS
Renal Consult Note    Thank you to requesting provider: Dr Katharine Goodpasture for asking us to see [facility]    Reason for consultation:  ESRD    Chief Complaint:  Abdominal hernia repair. History of Presenting Illness      Patient is a 52-year-old man with a past medical history of hypertension coronary disease and diabetes. Patient has been on chronic maintenance hemodialysis via a right IJ PermCath for the last 5 months. He is wanting to switch to home peritoneal dialysis. He has an abdominal wall hernia and needed repair before receiving a peritoneal dialysis catheter. He has a previous umbilical hernia repaired. Patient then underwent repair of 2 ventral hernia on 12/29. Shortly after his surgery, his abdomen was distended and he was not feeling well. He started having some loose bowel movement and was complaining of nausea. He presented to a local hospital and was then transferred to [facility] in [place]. Patient underwent on 1/5 exploratory laparotomy with lysis of small adhesion of the right lower quadrant as well as mesh explantation. Renal service was consulted to manage his ESRD. Patient has missed weeks of dialysis when he was not feeling well. On 1/5, his labs showed a serum potassium of 5.6 and a serum bicarbonate of 13 with BUN at 109 and creatinine at 9.3. Patient was seen and examined on dialysis. He continues to have some abdominal pain.     Dialysis: Access- right IJ Permcath, , , UF 2.5 liters    Past Medical/Surgical History      Active Ambulatory Problems     Diagnosis Date Noted    No Active Ambulatory Problems     Resolved Ambulatory Problems     Diagnosis Date Noted    No Resolved Ambulatory Problems     Past Medical History:   Diagnosis Date    CAD (coronary artery disease)     Chronic kidney disease     [facility] [address]     Heart disease     Hemodialysis patient ([facility] [address]     Hyperlipidemia     Hypertension          Review of Systems     Constitutional:  No weight loss, no fever/chills  Eyes:  No eye pain, no eye redness  Cardiovascular:  No chest pain, no worsening of edema  Respiratory:  No hemoptysis, no stidor  Gastrointestinal:  No blood in stool, + n/ - v,+ diarrhea  Genitoruinary:  No hematuria, no difficulty with urination  Musculoskeletal:  No joint swelling, no redness  Integumentary:  No Rash, no itching  Neurological:  No focal weakness, No new sensory deficit  Psychiatric:  No depression, no confusion  Endocrine:  No polyuria, no polydipsia       Medications        Current Facility-Administered Medications:     lactated ringers infusion, , IntraVENous, Continuous, Viv Smith MD    sodium chloride flush 0.9 % injection 10 mL, 10 mL, IntraVENous, 2 times per day, Viv Smith MD    sodium chloride flush 0.9 % injection 10 mL, 10 mL, IntraVENous, PRN, Viv Smith MD    0.9 % sodium chloride infusion, 25 mL, IntraVENous, PRN, Viv Smith MD    famotidine (PEPCID) injection 20 mg, 20 mg, IntraVENous, Once, Viv Smith MD    sodium chloride flush 0.9 % injection 5-40 mL, 5-40 mL, IntraVENous, 2 times per day, Abiel Rodriguez DO    sodium chloride flush 0.9 % injection 5-40 mL, 5-40 mL, IntraVENous, PRN, Abiel Rodriguez DO    0.9 % sodium chloride infusion, 25 mL, IntraVENous, PRN, Abiel Rodriguez,     ondansetron (ZOFRAN-ODT) disintegrating tablet 4 mg, 4 mg, Oral, Q8H PRN **OR** ondansetron (ZOFRAN) injection 4 mg, 4 mg, IntraVENous, Q6H PRN, Vallorie Earnest, DO    heparin (porcine) injection 5,000 Units, 5,000 Units, SubCUTAneous, 3 times per day, Abiel Rodriguez DO    oxyCODONE (ROXICODONE) immediate release tablet 5 mg, 5 mg, Oral, Q4H PRN, Vallorie Earnest, DO    HYDROmorphone HCl PF (DILAUDID) injection 0.5 mg, 0.5 mg, IntraVENous, Q3H PRN, Vallorie Earnest, DO    allopurinol (ZYLOPRIM) tablet 200 mg, 200 mg, Oral, Daily, Abiel Rodriguez DO    aspirin chewable tablet 81 mg, 81 mg, Oral, Daily, Abiel J Curt Taylor DO    calcitRIOL (ROCALTROL) capsule 0.5 mcg, 0.5 mcg, Oral, Daily, Abiel Rodriguez DO    dilTIAZem (CARDIZEM 12 HR) extended release capsule 120 mg, 120 mg, Oral, Daily, Abiel Rodriguez DO    carvedilol (COREG) tablet 3.125 mg, 3.125 mg, Oral, BID , Abiel Rodriguez DO    cloNIDine (CATAPRES) tablet 0.1 mg, 0.1 mg, Oral, PRN, Abiel Rodriguez DO    DULoxetine (CYMBALTA) extended release capsule 60 mg, 60 mg, Oral, Daily, Abiel Rodriguez DO    ferrous sulfate (IRON 325) tablet 325 mg, 325 mg, Oral, TID , Abiel Rodriguez DO    isosorbide mononitrate (IMDUR) extended release tablet 120 mg, 120 mg, Oral, Daily, Abiel Rodriguez DO    ranolazine (RANEXA) extended release tablet 500 mg, 500 mg, Oral, BID, Abiel Rodriguez DO    ascorbic acid (VITAMIN C) tablet 500 mg, 500 mg, Oral, BID, Abiel Rodriguez DO    lactated ringers infusion, , IntraVENous, Continuous, Abiel Rodriguez DO, Last Rate: 100 mL/hr at 01/05/22 0811, NoRateChange at 01/05/22 0811    ondansetron (ZOFRAN) injection 4 mg, 4 mg, IntraVENous, Q6H PRN, Anton Everett DO, 4 mg at 01/04/22 2257    acetaminophen (TYLENOL) tablet 650 mg, 650 mg, Oral, Q4H PRN, Anton Everett DO    HYDROmorphone HCl PF (DILAUDID) injection 0.5 mg, 0.5 mg, IntraVENous, Q4H PRN, Anton Everett DO, 0.5 mg at 01/05/22 1231    simethicone (MYLICON) chewable tablet 80 mg, 80 mg, Oral, BID, Abiel Rodriguez DO, 80 mg at 01/04/22 2315  Outpatient Medications Marked as Taking for the 1/4/22 encounter [facility] Encounter)   Medication Sig Dispense Refill    Multiple Vitamins-Minerals (THERAPEUTIC MULTIVITAMIN-MINERALS) tablet Take 1 tablet by mouth daily      carvedilol (COREG) 3.125 MG tablet Take 3.125 mg by mouth 2 times daily (with meals)      dilTIAZem (CARDIZEM 12 HR) 120 MG extended release capsule Take 120 mg by mouth daily       allopurinol (ZYLOPRIM) 100 MG tablet Take 200 mg by mouth daily       aspirin 81 MG chewable tablet Take 1 tablet by mouth daily       cloNIDine (CATAPRES) 0.1 MG tablet Take 1 tablet by mouth as needed for High Blood Pressure       isosorbide mononitrate (IMDUR) 120 MG extended release tablet Take 1 tablet by mouth daily       nitroGLYCERIN (NITROSTAT) 0.4 MG SL tablet Place 1 tablet under the tongue every 5 minutes as needed       ranolazine (RANEXA) 500 MG extended release tablet Take 1 tablet by mouth 2 times daily       rosuvastatin (CRESTOR) 40 MG tablet Take 20 mg by mouth daily       DULoxetine HCl 60 MG CSDR Take 60 mg by mouth daily       meclizine (ANTIVERT) 25 MG CHEW Take 25 mg by mouth 2 times daily as needed       sodium bicarbonate 325 MG tablet Take 1 tablet by mouth 3 times daily       clopidogrel (PLAVIX) 75 MG tablet Take 1 tablet by mouth      ferrous sulfate (IRON 325) 325 (65 Fe) MG tablet Take 1 tablet by mouth 3 times daily (with meals)       ascorbic acid (VITAMIN C) 500 MG tablet Take 1 tablet by mouth      Ascorbic Acid  MG CPCR Take 500 mg by mouth 2 times daily      Calcium-Mag-Vit C-Vit D 767-44- PACK Take 1 tablet by mouth daily      vitamin D 25 MCG (1000 UT) CAPS Take by mouth      vitamin E 1000 units capsule Take 400 Units by mouth daily          Allergies   Patient has no known allergies. Family History     No family history on file. Family history negative for kidney disease.      Social History      Social History     Socioeconomic History    Marital status: Single     Spouse name: None    Number of children: None    Years of education: None    Highest education level: None   Occupational History    None   Tobacco Use    Smoking status: Former Smoker    Smokeless tobacco: Never Used   Vaping Use    Vaping Use: Never used   Substance and Sexual Activity    Alcohol use: Never    Drug use: Never    Sexual activity: None   Other Topics Concern    None   Social History Narrative    None     Social Determinants of Health 01/05/22  0351   WBC 5.8   HGB 11.0*   HCT 34.8*   MCV 98.6*        Recent Labs     01/05/22  0351      K 5.6*      CO2 13*   GLUCOSE 115*   *   CREATININE 9.3*   LABGLOM 6*   GFRAA 7*       Assessment:  1. ESRD  2. Type 2 DM with renal disease  3. Repair of ventral hernia  4. Abdominal pain  5. Nausea  6. Hyperkalemia  7. Metabolic acidosis      Plan:  · Dialysis as above. Will use 2K bath. Provide more dialysis again in AM. Check P level.      Thank you for asking us to participate in the management of your patient, please do not hesitate to contact me for any concerns regarding my recommendations as outlined above.    -----------------------------  Electronically signed by Dharmesh Edmondson MD on 1/5/22 at 1:49 PM CST

## 2022-01-05 NOTE — PROGRESS NOTES
[address] arrived to room # 533. Presented with: small bowel obstruction  Mental Status: Patient is oriented, alert, coherent, logical, thought processes intact and able to concentrate and follow conversation. Vitals:    01/05/22 0513   BP: (!) 167/110   Pulse: 65   Resp: 16   Temp: 97.7 °F (36.5 °C)   SpO2: 99%     Patient safety contract and falls prevention contract reviewed with patient Yes. Oriented Patient to room. Call light within reach. Yes.   Needs, issues or concerns expressed at this time: no.      Electronically signed by Cassandra Heimlich, RN on 1/5/2022 at 5:51 AM

## 2022-01-05 NOTE — H&P
Kishore Phillips is an 77 y.o.  male. He had laparoscopic hernia repairs of 2 ventral hernias on December 29 in anticipation of being able to start peritoneal dialysis fairly soon. Within a day after surgery his abdomen began to distend. He denies fever and he has had some loose bowel movement and complains of dry heaves. He presented to another hospital ER and was transferred here last night. He has not felt like going to his hemodialysis appointments for the last week. Past Medical History:   Diagnosis Date    CAD (coronary artery disease)     Chronic kidney disease     [facility] mellitus ([facility] [address]     Heart disease     Hemodialysis patient ([facility] [address] [facility]     Hyperlipidemia     Hypertension      Past surgical history includes several stents for coronary artery disease, a remote hernia repair, and a recent laparoscopic hernia repair    Allergies: No Known Allergies    Active Problems:    * No active hospital problems. *  Resolved Problems:    * No resolved hospital problems. *    Blood pressure (!) 167/110, pulse 65, temperature 97.7 °F (36.5 °C), temperature source Temporal, resp. rate 16, height 5' 11\" (1.803 m), weight 154 lb 9.6 oz (70.1 kg), SpO2 99 %. Review of Systems   Constitutional: Positive for appetite change. Negative for fever. Respiratory: Negative. Cardiovascular: Negative. Gastrointestinal: Positive for abdominal distention and abdominal pain. Patient has had some loose stool and states that he cannot tell whether he has to urinate or have a stool       Physical Exam  Constitutional:       Comments: Patient is having pain due to abdominal distention   HENT:      Head: Normocephalic. Mouth/Throat:      Mouth: Mucous membranes are moist.   Cardiovascular:      Heart sounds: Normal heart sounds. Pulmonary:      Effort: Pulmonary effort is normal.      Breath sounds: Normal breath sounds. Abdominal:      General: There is distension.       Comments: Hyperactive bowel sounds Musculoskeletal:         General: No swelling. Cervical back: Neck supple. Skin:     General: Skin is warm and dry. Neurological:      General: No focal deficit present. Mental Status: He is alert and oriented to person, place, and time. Psychiatric:         Thought Content:  Thought content normal.         Judgment: Judgment normal.     The patient's KUB shows small bowel distended with air    Assessment:  Postop small bowel obstruction    Plan:  The patient will need exploration    Samir Elizondo MD  1/5/2022

## 2022-01-05 NOTE — PROGRESS NOTES
Pt given his home dose of Cardizem in dialysis due to continued heart rate running tachycardic from 120-140.

## 2022-01-05 NOTE — PROGRESS NOTES
4 Eyes Skin Assessment    Bryn Eaton is being assessed upon: Admission    I agree that I, Mayda Noriega RN, along with Mahamed Davis RN (either 2 RN's or 1 LPN and 1 RN) have performed a thorough Head to Toe Skin Assessment on the patient. ALL assessment sites listed below have been assessed. Areas assessed by both nurses:     [x]   Head, Face, and Ears   [x]   Shoulders, Back, and Chest  [x]   Arms, Elbows, and Hands   [x]   Coccyx, Sacrum, and Ischium  [x]   Legs, Feet, and Heels    Does the Patient have Skin Breakdown?  No    Hung Prevention initiated: No  Wound Care Orders initiated: No    WO nurse consulted for Pressure Injury (Stage 3,4, Unstageable, DTI, NWPT, and Complex wounds) and New or Established Ostomies: No        Primary Nurse eSignature: Mayda Noriega RN on 1/5/2022 at 5:52 AM      Co-Signer eSignature: Electronically signed by Mahamed Davis RN on 1/5/22 at 5:53 AM CST

## 2022-01-05 NOTE — BRIEF OP NOTE
Brief Postoperative Note      Patient: Ritu Heller Sr.  YOB: 1945  MRN: [id number]    Date of Procedure: 1/5/2022    Pre-Op Diagnosis: SBO    Post-Op Diagnosis: Same       Procedure:  Exploratory Laparotomy  Lysis of small adhesion of the RLQ  Mesh explantation     Surgeon(s):  Tito Anderson DO    Assistant:  First Assistant: Basim Brown    Anesthesia: General    Estimated Blood Loss (mL): less than 50     Complications: None    Specimens:   ID Type Source Tests Collected by Time Destination   A : mesh for gross exam Hardware Abdomen SURGICAL PATHOLOGY Tito Anderson DO 1/5/2022 0915        Implants:  * No implants in log *      Drains:   NG/OG/NJ/NE Tube Nasogastric 18 fr Left nostril (Active)       Urethral Catheter Non-latex;Straight-tip 16 fr (Active)   Catheter Indications Urinary retention (acute or chronic), continuous bladder irrigation or bladder outlet obstruction 01/05/22 0943   Urine Color Tea 01/05/22 0943       Findings: Distended bowel with area kinked just proximal to TI. Area appears to been compressed by band and does have proximal dilation and distal decompression. Small bands of adhesions of the right lower quadrant to the cecum. Normal appendix. Intra-abdominal fluid concern for translocation to mesh requiring mesh explantation.     Electronically signed by Tito Anderson DO on 1/5/2022 at 10:01 AM

## 2022-01-05 NOTE — PROGRESS NOTES
Comprehensive Nutrition Assessment    Type and Reason for Visit:  Initial,Positive Nutrition Screen    Nutrition Assessment:  Pt is NPO and in OR. Will monitor for diet progression and implement nutrition intervention as needed. Malnutrition Assessment:  Malnutrition Status:   At risk for malnutrition (Comment)    Context:  Acute Illness     Findings of the 6 clinical characteristics of malnutrition:  Energy Intake:  Mild decrease in energy intake (Comment)  Weight Loss:  No significant weight loss     Body Fat Loss:  Unable to assess     Muscle Mass Loss:  Unable to assess    Fluid Accumulation:  Unable to assess     Strength:  Not Performed    Estimated Daily Nutrient Needs:  Energy (kcal):  1001-5431 kcals/day; Weight Used for Energy Requirements:  Current (25-30)     Protein (g):  105-140 g/PRO/day; Weight Used for Protein Requirements:  Current (1.5-2.0)        Fluid (ml/day):  9030-0943 mL/day; Method Used for Fluid Requirements:  1 ml/kcal      Wounds:  Surgical Incision       Current Nutrition Therapies:    Diet NPO Exceptions are: Sips of Water with Meds, Ice Chips    Anthropometric Measures:  · Height: 5' 11\" (180.3 cm)  · Current Body Weight: 154 lb (69.9 kg)    · Ideal Body Weight: 172 lbs  · BMI: 21.5  · BMI Categories: Underweight (BMI less than 22) age over 72       Nutrition Diagnosis:   · Inadequate oral intake related to acute injury/trauma as evidenced by NPO or clear liquid status due to medical condition    Nutrition Interventions:   Food and/or Nutrient Delivery:  Continue NPO  Coordination of Nutrition Care:  Continue to monitor while inpatient    Goals:  Pt will progress to an oral diet to meet nutritional needs       Nutrition Monitoring and Evaluation:   Food/Nutrient Intake Outcomes:  Diet Advancement/Tolerance  Physical Signs/Symptoms Outcomes:  Biochemical Data,Hemodynamic Status,Nutrition Focused Physical Findings,Skin,Weight     Electronically signed by Ana Lei MS, MALIK HEIN on 1/5/22 at 10:22 AM CST    Contact: [phone]

## 2022-01-05 NOTE — OP NOTE
under the mesh and no noted adhesions or hernia noted. At that time fascia opened the length of the incision. Small bowel was then extracorporealized. As we did follow the bowel to the right lower quadrant toward ileocecal valve there was an area approximately 10 to 20 cm proximal to the valve that was noted to be the apparent transition point. This area did have dilated proximal bowel up to an area that is. To have some extrinsic compression and distal with decompressed. The bowel was ran proximal distal throughout its length and no other signs of obstruction were noted. On inspection of the right lower quadrant there was a band of tissue from the pelvic sidewall to the cecum that did appear to be a site where this could have occurred. I did take this band down with cautery. At that time the abdomen was irrigated and the bowel was placed back in the abdomen. The mesh that was placed last week in the intraperitoneal position was then removed for concern of bacterial translocation and mesh infection. The hernia defect was incorporated into the ex lap incision itself. The incision was then closed using 1-0 looped PDS in a running fashion. Denice block performed in an open fashion with Exparel. Subcu irrigated. Skin closed using staples. All needle sponge instrument counts were correct x2. Patient taken the operative suite to be recovered per protocol and readmitted to the floor.     Electronically signed by Segundo Simpson DO on 1/5/2022 at 12:01 PM

## 2022-01-05 NOTE — PROGRESS NOTES
Patient has ESRD and is a hemodialysis patient, pt gets treatment at [facility] at [facility] on Tuesday, Thursday, Saturday. Patient informed me that he has not had treatment in 2 weeks due to not feeling well from his recent hernia repair on 12/29.

## 2022-01-06 ENCOUNTER — APPOINTMENT (OUTPATIENT)
Dept: CT IMAGING | Age: 77
DRG: 356 | End: 2022-01-06
Attending: SURGERY
Payer: OTHER GOVERNMENT

## 2022-01-06 PROBLEM — I48.92 ATRIAL FLUTTER WITH RAPID VENTRICULAR RESPONSE (HCC): Status: ACTIVE | Noted: 2022-01-06

## 2022-01-06 PROBLEM — N18.6 ESRD (END STAGE RENAL DISEASE) (HCC): Status: ACTIVE | Noted: 2022-01-06

## 2022-01-06 LAB
ANION GAP SERPL CALCULATED.3IONS-SCNC: 24 MMOL/L (ref 7–19)
ANISOCYTOSIS: ABNORMAL
BANDED NEUTROPHILS RELATIVE PERCENT: 33 % (ref 0–5)
BASOPHILS ABSOLUTE: 0 K/UL (ref 0–0.2)
BASOPHILS RELATIVE PERCENT: 0 % (ref 0–1)
BUN BLDV-MCNC: 65 MG/DL (ref 8–23)
CALCIUM SERPL-MCNC: 9.2 MG/DL (ref 8.8–10.2)
CHLORIDE BLD-SCNC: 103 MMOL/L (ref 98–111)
CO2: 17 MMOL/L (ref 22–29)
CREAT SERPL-MCNC: 6.6 MG/DL (ref 0.5–1.2)
D DIMER: >20 UG/ML FEU (ref 0–0.48)
EKG P AXIS: 43 DEGREES
EKG P-R INTERVAL: 132 MS
EKG Q-T INTERVAL: 364 MS
EKG QRS DURATION: 78 MS
EKG QTC CALCULATION (BAZETT): 435 MS
EKG T AXIS: -149 DEGREES
EOSINOPHILS ABSOLUTE: 0 K/UL (ref 0–0.6)
EOSINOPHILS RELATIVE PERCENT: 0 % (ref 0–5)
GFR AFRICAN AMERICAN: 10
GFR NON-AFRICAN AMERICAN: 8
GLUCOSE BLD-MCNC: 134 MG/DL (ref 74–109)
HCT VFR BLD CALC: 32.4 % (ref 42–52)
HEMOGLOBIN: 10.5 G/DL (ref 14–18)
IMMATURE GRANULOCYTES #: 0.1 K/UL
LYMPHOCYTES ABSOLUTE: 0.5 K/UL (ref 1.1–4.5)
LYMPHOCYTES RELATIVE PERCENT: 10 % (ref 20–40)
MACROCYTES: ABNORMAL
MAGNESIUM: 1.9 MG/DL (ref 1.6–2.4)
MCH RBC QN AUTO: 31.2 PG (ref 27–31)
MCHC RBC AUTO-ENTMCNC: 32.4 G/DL (ref 33–37)
MCV RBC AUTO: 96.1 FL (ref 80–94)
MONOCYTES ABSOLUTE: 0.2 K/UL (ref 0–0.9)
MONOCYTES RELATIVE PERCENT: 3 % (ref 0–10)
NEUTROPHILS ABSOLUTE: 4.6 K/UL (ref 1.5–7.5)
NEUTROPHILS RELATIVE PERCENT: 54 % (ref 50–65)
PDW BLD-RTO: 15.4 % (ref 11.5–14.5)
PHOSPHORUS: 9.2 MG/DL (ref 2.5–4.5)
PLATELET # BLD: 349 K/UL (ref 130–400)
PLATELET SLIDE REVIEW: ADEQUATE
PMV BLD AUTO: 10.7 FL (ref 9.4–12.4)
POTASSIUM REFLEX MAGNESIUM: 4.7 MMOL/L (ref 3.5–5)
PRO-BNP: ABNORMAL PG/ML (ref 0–1800)
RBC # BLD: 3.37 M/UL (ref 4.7–6.1)
SODIUM BLD-SCNC: 144 MMOL/L (ref 136–145)
TROPONIN: 0.24 NG/ML (ref 0–0.03)
TSH REFLEX FT4: 1.17 UIU/ML (ref 0.35–5.5)
WBC # BLD: 5.3 K/UL (ref 4.8–10.8)

## 2022-01-06 PROCEDURE — 2500000003 HC RX 250 WO HCPCS: Performed by: INTERNAL MEDICINE

## 2022-01-06 PROCEDURE — 84443 ASSAY THYROID STIM HORMONE: CPT

## 2022-01-06 PROCEDURE — 2140000000 HC CCU INTERMEDIATE R&B

## 2022-01-06 PROCEDURE — 85025 COMPLETE CBC W/AUTO DIFF WBC: CPT

## 2022-01-06 PROCEDURE — 99255 IP/OBS CONSLTJ NEW/EST HI 80: CPT | Performed by: INTERNAL MEDICINE

## 2022-01-06 PROCEDURE — 6360000002 HC RX W HCPCS: Performed by: SURGERY

## 2022-01-06 PROCEDURE — 80048 BASIC METABOLIC PNL TOTAL CA: CPT

## 2022-01-06 PROCEDURE — 85379 FIBRIN DEGRADATION QUANT: CPT

## 2022-01-06 PROCEDURE — 84100 ASSAY OF PHOSPHORUS: CPT

## 2022-01-06 PROCEDURE — 93005 ELECTROCARDIOGRAM TRACING: CPT | Performed by: INTERNAL MEDICINE

## 2022-01-06 PROCEDURE — 83880 ASSAY OF NATRIURETIC PEPTIDE: CPT

## 2022-01-06 PROCEDURE — 84484 ASSAY OF TROPONIN QUANT: CPT

## 2022-01-06 PROCEDURE — 74175 CTA ABDOMEN W/CONTRAST: CPT

## 2022-01-06 PROCEDURE — 36415 COLL VENOUS BLD VENIPUNCTURE: CPT

## 2022-01-06 PROCEDURE — 83735 ASSAY OF MAGNESIUM: CPT

## 2022-01-06 PROCEDURE — 6360000004 HC RX CONTRAST MEDICATION: Performed by: SURGERY

## 2022-01-06 PROCEDURE — 2580000003 HC RX 258: Performed by: INTERNAL MEDICINE

## 2022-01-06 PROCEDURE — 2700000000 HC OXYGEN THERAPY PER DAY

## 2022-01-06 PROCEDURE — 99024 POSTOP FOLLOW-UP VISIT: CPT | Performed by: SURGERY

## 2022-01-06 PROCEDURE — 8010000000 HC HEMODIALYSIS ACUTE INPT

## 2022-01-06 RX ORDER — FAMOTIDINE 40 MG/1
40 TABLET, FILM COATED ORAL PRN
COMMUNITY

## 2022-01-06 RX ORDER — DOCUSATE SODIUM 100 MG/1
100 CAPSULE, LIQUID FILLED ORAL PRN
COMMUNITY

## 2022-01-06 RX ORDER — CARVEDILOL 6.25 MG/1
6.25 TABLET ORAL 2 TIMES DAILY WITH MEALS
Status: DISCONTINUED | OUTPATIENT
Start: 2022-01-06 | End: 2022-01-08

## 2022-01-06 RX ADMIN — HEPARIN SODIUM 5000 UNITS: 5000 INJECTION INTRAVENOUS; SUBCUTANEOUS at 15:27

## 2022-01-06 RX ADMIN — HYDROMORPHONE HYDROCHLORIDE 0.5 MG: 1 INJECTION, SOLUTION INTRAMUSCULAR; INTRAVENOUS; SUBCUTANEOUS at 05:53

## 2022-01-06 RX ADMIN — HEPARIN SODIUM 5000 UNITS: 5000 INJECTION INTRAVENOUS; SUBCUTANEOUS at 05:53

## 2022-01-06 RX ADMIN — IOPAMIDOL 90 ML: 755 INJECTION, SOLUTION INTRAVENOUS at 10:10

## 2022-01-06 RX ADMIN — HYDROMORPHONE HYDROCHLORIDE 0.5 MG: 1 INJECTION, SOLUTION INTRAMUSCULAR; INTRAVENOUS; SUBCUTANEOUS at 15:42

## 2022-01-06 RX ADMIN — HEPARIN SODIUM 5000 UNITS: 5000 INJECTION INTRAVENOUS; SUBCUTANEOUS at 22:06

## 2022-01-06 RX ADMIN — HYDROMORPHONE HYDROCHLORIDE 0.5 MG: 1 INJECTION, SOLUTION INTRAMUSCULAR; INTRAVENOUS; SUBCUTANEOUS at 12:36

## 2022-01-06 RX ADMIN — DILTIAZEM HYDROCHLORIDE 10 MG/HR: 5 INJECTION INTRAVENOUS at 11:22

## 2022-01-06 RX ADMIN — HYDROMORPHONE HYDROCHLORIDE 0.5 MG: 1 INJECTION, SOLUTION INTRAMUSCULAR; INTRAVENOUS; SUBCUTANEOUS at 09:38

## 2022-01-06 ASSESSMENT — ENCOUNTER SYMPTOMS
SHORTNESS OF BREATH: 0
DIARRHEA: 0
NAUSEA: 0
GASTROINTESTINAL NEGATIVE: 1
EYES NEGATIVE: 1
RESPIRATORY NEGATIVE: 1
VOMITING: 0

## 2022-01-06 ASSESSMENT — PAIN SCALES - GENERAL
PAINLEVEL_OUTOF10: 8
PAINLEVEL_OUTOF10: 7
PAINLEVEL_OUTOF10: 8
PAINLEVEL_OUTOF10: 9
PAINLEVEL_OUTOF10: 0

## 2022-01-06 NOTE — CONSULTS
[place], [place], [address]    DEPARTMENT OF HOSPITALIST MEDICINE      MEDICAL CONSULT:        REASON FOR CONSULTATION:   patient co-management    REASON FOR ADMISSION:  Abd pain. HISTORY OF PRESENT ILLNESS:  [address] is an 77 y.o. male has a history of end-stage renal disease on hemodialysis. He also has a history of coronary artery disease, diabetes, hyperlipidemia, hypertension, and severely noncompliant with his renal replacement therapy. Patient presented with abdominal pain small bowel obstruction and underwent a exploratory lap with lysis of adhesions. Nephrology is following his dialysis treatments Dr. Regina Gonzalez is his surgeon we have been asked hospitalist to follow for medical management. He also is followed by cardiology. Patient went into atrial flutter today and Cardizem drip was placed, at present he is in normal sinus rhythm. He refused dialysis today. He and wife state his abdomen is larger today than pre op. Surgeon notified of findings on todays studies.       PAST MEDICAL HISTORY:  Past Medical History:   Diagnosis Date    CAD (coronary artery disease)     Chronic kidney disease     [facility]     Heart disease     Hemodialysis patient ([facility]     Hyperlipidemia     Hypertension        PAST SURGICAL HISTORY:  Past Surgical History:   Procedure Laterality Date    CORONARY ANGIOPLASTY WITH STENT PLACEMENT      LAPAROTOMY N/A 1/5/2022    LAPAROTOMY EXPLORATORY, LYSIS OF ADHESION performed by Ariela Leon DO at [address] [place] [address] N/A 12/29/2021    LAPAROSCOPIC PRIMARY EPIGASTRIC HERNIA REPAIR WITH MESH performed by Ariela Leon DO at [facility] OR       SOCIAL HISTORY:  Social History     Socioeconomic History    Marital status: Single     Spouse name: None    Number of children: None    Years of education: None    Highest education level: None   Occupational History    None   Tobacco Use    81 mg by mouth daily  dilTIAZem (TIAZAC) 120 MG extended release capsule, Take 120 mg by mouth daily  vitamin E 400 UNIT capsule, Take 400 Units by mouth daily  cloNIDine (CATAPRES) 0.2 MG/24HR PTWK, Place 1 patch onto the skin once a week  Multiple Vitamins-Minerals (THERAPEUTIC MULTIVITAMIN-MINERALS) tablet, Take 1 tablet by mouth daily  carvedilol (COREG) 3.125 MG tablet, Take 6.25 mg by mouth 2 times daily (with meals)   allopurinol (ZYLOPRIM) 100 MG tablet, Take 200 mg by mouth daily   cloNIDine (CATAPRES) 0.1 MG tablet, Take 1 tablet by mouth as needed for High Blood Pressure   isosorbide mononitrate (IMDUR) 120 MG extended release tablet, Take 1 tablet by mouth daily   nitroGLYCERIN (NITROSTAT) 0.4 MG SL tablet, Place 1 tablet under the tongue every 5 minutes as needed   ranolazine (RANEXA) 500 MG extended release tablet, Take 1 tablet by mouth 2 times daily   rosuvastatin (CRESTOR) 40 MG tablet, Take 20 mg by mouth daily   DULoxetine HCl 60 MG CSDR, Take 60 mg by mouth daily   sodium bicarbonate 325 MG tablet, Take 1 tablet by mouth 3 times daily   clopidogrel (PLAVIX) 75 MG tablet, Take 1 tablet by mouth daily   ferrous sulfate (IRON 325) 325 (65 Fe) MG tablet, Take 1 tablet by mouth 3 times daily (with meals)   Calcium-Mag-Vit C-Vit D 019-33- PACK, Take 1 tablet by mouth daily  vitamin D 25 MCG (1000 UT) CAPS, Take by mouth  [DISCONTINUED] meclizine (ANTIVERT) 25 MG CHEW, Take 25 mg by mouth 2 times daily as needed   [DISCONTINUED] hydrALAZINE (APRESOLINE) 100 MG tablet, Take 100 mg by mouth 2 times daily  [DISCONTINUED] lisinopril (PRINIVIL;ZESTRIL) 40 MG tablet, Take 40 mg by mouth daily  [DISCONTINUED] dilTIAZem (CARDIZEM 12 HR) 120 MG extended release capsule, Take 120 mg by mouth daily   calcitRIOL (ROCALTROL) 0.5 MCG capsule, Take 0.5 mcg by mouth daily  (Patient not taking: Reported on 12/29/2021)  Methoxy PEG-Epoetin Beta (MIRCERA IJ), 75 mcg (Patient not taking: Reported on 12/17/2021)  [DISCONTINUED] aspirin 81 MG chewable tablet, Take 1 tablet by mouth daily   [DISCONTINUED] Tuberculin PPD (TUBERSOL ID), Inject 0.1 mLs into the skin (Patient not taking: Reported on 12/17/2021)  [DISCONTINUED] ascorbic acid (VITAMIN C) 500 MG tablet, Take 1 tablet by mouth  [DISCONTINUED] Ascorbic Acid  MG CPCR, Take 500 mg by mouth 2 times daily  [DISCONTINUED] vitamin E 1000 units capsule, Take 400 Units by mouth daily     REVIEW OF SYSTEMS:  Constitutional:  No fevers, chills, nausea, vomiting, + tiredness and fatigue   Head:  No head injury, facial trauma   Eyes:  No acute visual changes, exudate, trauma   Ears:  No acute hearing loss, earaches   Nose: No nasal discharge, epistaxis   Neck: No new hoarseness, voice change, or new masses   Lungs:   No hemoptysis, pleurisy   Heart:  No chest pressure with exertion,  Positive palpitations,    Abdomen:   No new masses, no bright red blood per rectum c/o bloating and distended abd   Extremities: No acute pain while ambulating, no new lesions   Skin: No new changes in skin color, no rashes or lesions   Neurologic: No new motor or sensory changes       PHYSICAL EXAM:  BP (!) 142/75   Pulse 97   Temp 96.3 °F (35.7 °C) (Temporal)   Resp 16   Ht 5' 11\" (1.803 m)   Wt 144 lb 3.2 oz (65.4 kg)   SpO2 100%   BMI 20.11 kg/m²   No intake/output data recorded.     PHYSICAL EXAMINATION:    Vital Signs: Please see the chart   HILLARY:  Awake, alert, oriented x 3, patient appears tired and fatigued   Head/Eyes:  Normocephalic, atraumatic, EOMI and PERRLA bilaterally   ENT: Moist mucous membranes, nasal passages clear   Neck: Supple, full range of motion, no carotid bruit, trachea midline   Respiratory:   Bilateral fair air entry in both lung fields, mild B/L crackles, symmetric expansion of chest   Cardiovascular:  Regular rate and rhythm, S1+S2+0, no murmurs/rubs   Urology: No bilateral CVA tenderness, no suprapubic tenderness   Abdomen:   Soft,tender, no bowel sounds heard throughout   Muscle/Joints: Moves all, full range of motion, no muscle spasms   Extremities: No clubbing, no cyanosis, no calf tenderness, no edema   Pulses: 2+ bilaterally, symmetrical   Skin: Warm, dry, no pallor/cyanosis/jaundice, no rashes/lesions   Neurologic: Awake, alert, oriented x 3, cranial nerves II-XII intact, no focal neurological deficits, sensory system intact   Psychiatric: Normal mood, non-suicidal         LABORATORY DATA:  Recent Labs     01/05/22  0351 01/06/22  0211   WBC 5.8 5.3   HGB 11.0* 10.5*    349     Recent Labs     01/05/22  0351 01/06/22 0211    144   K 5.6* 4.7    103   CO2 13* 17*   * 65*   CREATININE 9.3* 6.6*   GLUCOSE 115* 134*     Recent Labs     01/05/22 0351   AST 29   ALT 10   BILITOT 0.3   ALKPHOS 85     Troponin T:   Recent Labs     01/06/22  0953   TROPONINI 0.24*     Pro-BNP: No results for input(s): BNP in the last 72 hours. INR: No results for input(s): INR in the last 72 hours. UA:No results for input(s): NITRITE, COLORU, PHUR, LABCAST, WBCUA, RBCUA, MUCUS, TRICHOMONAS, YEAST, BACTERIA, CLARITYU, SPECGRAV, LEUKOCYTESUR, UROBILINOGEN, BILIRUBINUR, BLOODU, GLUCOSEU, AMORPHOUS in the last 72 hours. Invalid input(s): Johnathan Herrera  A1C: No results for input(s): LABA1C in the last 72 hours. ABG:No results for input(s): PHART, GNM1DKE, PO2ART, HWM5NFX, BEART, HGBAE, K7APTRRB, CARBOXHGBART in the last 72 hours. EKG:   Atrial flutter. IMAGING:  CTA ABDOMEN W CONTRAST    Result Date: 1/6/2022  1. Severe atheromatous changes of the abdominal aorta iliac and femoral arteries. 2. High-grade stenosis of the proximal right renal artery. 3. No evidence of celiac and superior mesenteric artery origin stenosis in the visualized part of these arteries. 4. Significant atheromatous changes of stenosis of the iliac arteries as detailed above.  5. Significant persistent dilatation of small bowel loops may represent distal small bowel obstruction. This may partly represent ileus (post procedure/postsurgical?). 6. Moderate thickening and enhancement of the distal ileum with surrounding peritoneal infiltration and fluid. This may represent an inflammatory or neoplastic process? Fara Govea The large bowel is decompressed. 7. Incompletely evaluated low-density nodules in both kidneys. 8. Bilateral fat-containing inguinal hernias. Soft tissue density in the right inguinal hernia may represent a loculated fluid, a cyst or a partially descended right testes? . 9. Enlarged prostate. Signed by Dr Alice Negro (2 VIEWS)    Result Date: 1/5/2022  1. Distal small bowel obstruction. Signed by Dr Luis Sumner and Plan: Active Problems:    Atrial flutter with rapid ventricular response [facility])   Cardiology following   Cardizem gtt. NSR at presetnt    Small bowel obstruction (HCC)   Post op day 1   No bowel sounds   CTA shows ileus vs distal SBO    ESRD (end stage renal disease) (HCC)   Non compliance   Refused dialysis today  Resolved Problems:    * No resolved hospital problems. *        Thank you, Vesna Porter MD , for involving us in the care of this wonderful patient. We will follow up closely with you while the patient is admitted on the floor. Please feel free to call [address], APRN - CNP, if you have any questions. Repeat labs in a.m. Electrolyte replacement as per protocol. Patient will be monitored very closely on the floor. Further recommendations as per the hospital course. Patient's medical management will be taken over by our [facility] Team in am.      Attestation:  A minimum of two midnights of inpatient hospital care is anticipated to be required based on the patient's clinical condition which requires intensive medical therapy. At this time the patient is not clinically optimized for safe discharge.        Ace Cummings, APRN - CNP  2:19 PM 1/6/2022      DISCLAIMER: This note was created with electronic voice recognition which does have occasional errors. If you have any questions regarding the content within the note please do not hesitate to contact me. .. Thanks.

## 2022-01-06 NOTE — PROGRESS NOTES
Left message with Dr. Terrell Brandt office regarding new order for CTA of abd with contrast. The pt is refusing dialysis this morning and creatinine was elevated. Awaiting callback. Radiology not proceeding with test at this time.

## 2022-01-06 NOTE — PROGRESS NOTES
[facility] General Surgery Progress Note    Chief Complaint:  No chief complaint on file. POD # 1 Ex Lap, BERYL of RLQ and Explantation of Epigastric mesh    S: Still distended. Passing gas. Still refusing NGT. Feeling weak overall. Refusing dialysis this AM. Increased HR overnight. On cardizem this AM.    O:   /74   Pulse 108   Temp 98.1 °F (36.7 °C) (Temporal)   Resp 16   Ht 5' 11\" (1.803 m)   Wt 144 lb 3.2 oz (65.4 kg)   SpO2 95%   BMI 20.11 kg/m²   I/O reviewed and appropriate  CONSTITUTIONAL: Alert, ill appearing  SKIN: warm, dry with no rashes or lesions  HEENT:  PERR. Trachea Midline. CHEST/LUNGS: Normal respiratory effort. CARDIOVASCULAR: No edema. ABDOMEN: Distended, yet appropriate TTP. Incisions: dressing in place  NEUROLOGIC: CN II-XI grossly intact, no motor or sensory deficits   MUSCULOSKELETAL: No clubbing or cyanosis. PSYCHIATRIC:  Normal Mood and Affect. Alert and Oriented. LABS:   CBC:   Recent Labs     01/05/22  0351 01/06/22 0211   WBC 5.8 5.3   RBC 3.53* 3.37*   HGB 11.0* 10.5*   HCT 34.8* 32.4*    349   LYMPHOPCT 13.0*  --    MONOPCT 11.8*  --    BASOPCT 0.2  --    MONOSABS 0.70  --    LYMPHSABS 0.8*  --    EOSABS 0.00  --    BASOSABS 0.00  --       BMP:   Recent Labs     01/05/22 0351 01/06/22 0211    144   K 5.6* 4.7    103   CO2 13* 17*   ANIONGAP 21* 24*   GLUCOSE 115* 134*   CREATININE 9.3* 6.6*   LABGLOM 6* 8*   CALCIUM 9.1 9.2     LFT:   Recent Labs     01/05/22 0351   PROT 6.3*   LABALBU 3.7   BILITOT 0.3   ALKPHOS 85   ALT 10   AST 29       A: Active Problems:    Small bowel obstruction (HCC)  Resolved Problems:    * No resolved hospital problems.  *      P:     NPO: Sips w meds and ice chips  Still with abd discomfort  Some concern for vascular disease considering the obstruction intra op was not overly impressive  Recommend CTA of the Abd if OK with Nephro  Pain control  Pt refusing NGT, refusing dialysis today  Cont to monitor closely  Daily labs  Appreciate Consult input      Wero Matos DO   Electronically Signed on 1/6/2022 at 9:10 AM

## 2022-01-06 NOTE — CONSULTS
Past Surgical History:  Past Surgical History:   Procedure Laterality Date    CORONARY ANGIOPLASTY WITH STENT PLACEMENT      LAPAROTOMY N/A 1/5/2022    LAPAROTOMY EXPLORATORY, LYSIS OF ADHESION performed by Krishna Abarca DO at [address] [place] [address] N/A 12/29/2021    LAPAROSCOPIC PRIMARY EPIGASTRIC HERNIA REPAIR WITH MESH performed by Krishna Abarca DO at [facility] OR       Past Family History:  No family history on file. Past Social History:  Social History     Socioeconomic History    Marital status: Single     Spouse name: Not on file    Number of children: Not on file    Years of education: Not on file    Highest education level: Not on file   Occupational History    Not on file   Tobacco Use    Smoking status: Former Smoker    Smokeless tobacco: Never Used   Vaping Use    Vaping Use: Never used   Substance and Sexual Activity    Alcohol use: Never    Drug use: Never    Sexual activity: Not on file   Other Topics Concern    Not on file   Social History Narrative    Not on file     Social Determinants of Health     Financial Resource Strain:     Difficulty of Paying Living Expenses: Not on file   Food Insecurity:     Worried About [address] in the Last Year: Not on file    [address] in the Last Year: Not on file   Transportation Needs:     Lack of Transportation (Medical): Not on file    Lack of Transportation (Non-Medical):  Not on file   Physical Activity:     Days of Exercise per Week: Not on file    Minutes of Exercise per Session: Not on file   Stress:     Feeling of Stress : Not on file   Social Connections:     Frequency of Communication with Friends and Family: Not on file    Frequency of Social Gatherings with Friends and Family: Not on file    Attends Church Services: Not on file    Active Member of Clubs or Organizations: Not on file    Attends Club or Organization Meetings: Not on file    Marital Status: Not on file   Intimate Partner Violence:     Fear of Current or Ex-Partner: Not on file    Emotionally Abused: Not on file    Physically Abused: Not on file    Sexually Abused: Not on file   Housing Stability:     Unable to Pay for Housing in the Last Year: Not on file    Number of Patricia in the Last Year: Not on file    Unstable Housing in the Last Year: Not on file       Allergies:  No Known Allergies    Home Meds:  Prior to Admission medications    Medication Sig Start Date End Date Taking?  Authorizing Provider   docusate sodium (COLACE) 100 MG capsule Take 100 mg by mouth as needed for Constipation   Yes Historical Provider, MD   famotidine (PEPCID) 40 MG tablet Take 40 mg by mouth as needed   Yes Historical Provider, MD   ascorbic acid (VITAMIN C) 500 MG tablet Take 500 mg by mouth 2 times daily   Yes Historical Provider, MD   aspirin 81 MG EC tablet Take 81 mg by mouth daily   Yes Historical Provider, MD   dilTIAZem (TIAZAC) 120 MG extended release capsule Take 120 mg by mouth daily   Yes Historical Provider, MD   vitamin E 400 UNIT capsule Take 400 Units by mouth daily   Yes Historical Provider, MD   cloNIDine (CATAPRES) 0.2 MG/24HR PTWK Place 1 patch onto the skin once a week   Yes Historical Provider, MD   Multiple Vitamins-Minerals (THERAPEUTIC MULTIVITAMIN-MINERALS) tablet Take 1 tablet by mouth daily   Yes Historical Provider, MD   carvedilol (COREG) 3.125 MG tablet Take 6.25 mg by mouth 2 times daily (with meals)    Yes Historical Provider, MD   allopurinol (ZYLOPRIM) 100 MG tablet Take 200 mg by mouth daily  7/29/21  Yes Historical Provider, MD   cloNIDine (CATAPRES) 0.1 MG tablet Take 1 tablet by mouth as needed for High Blood Pressure  7/19/21  Yes Historical Provider, MD   isosorbide mononitrate (IMDUR) 120 MG extended release tablet Take 1 tablet by mouth daily  7/19/21  Yes Historical Provider, MD   nitroGLYCERIN (NITROSTAT) 0.4 MG SL tablet Place 1 tablet under the tongue every 5 minutes as needed  7/19/21  Yes Historical Provider, MD   ranolazine (RANEXA) 500 MG extended release tablet Take 1 tablet by mouth 2 times daily  7/19/21  Yes Historical Provider, MD   rosuvastatin (CRESTOR) 40 MG tablet Take 20 mg by mouth daily  7/19/21  Yes Historical Provider, MD   DULoxetine HCl 60 MG CSDR Take 60 mg by mouth daily  7/19/21  Yes Historical Provider, MD   sodium bicarbonate 325 MG tablet Take 1 tablet by mouth 3 times daily  7/19/21  Yes Historical Provider, MD   clopidogrel (PLAVIX) 75 MG tablet Take 1 tablet by mouth daily  7/19/21  Yes Historical Provider, MD   ferrous sulfate (IRON 325) 325 (65 Fe) MG tablet Take 1 tablet by mouth 3 times daily (with meals)  7/29/21  Yes Historical Provider, MD   Calcium-Mag-Vit C-Vit D 784-24- PACK Take 1 tablet by mouth daily   Yes Historical Provider, MD   vitamin D 25 MCG (1000 UT) CAPS Take by mouth 7/29/21  Yes Historical Provider, MD   calcitRIOL (ROCALTROL) 0.5 MCG capsule Take 0.5 mcg by mouth daily   Patient not taking: Reported on 12/29/2021 7/21/21   Historical Provider, MD   Methoxy PEG-Epoetin Beta (MIRCERA IJ) 75 mcg  Patient not taking: Reported on 12/17/2021 7/23/21 7/22/22  Historical Provider, MD       Current Meds:   sodium chloride flush  10 mL IntraVENous 2 times per day    famotidine (PEPCID) injection  20 mg IntraVENous Once    sodium chloride flush  5-40 mL IntraVENous 2 times per day    heparin (porcine)  5,000 Units SubCUTAneous 3 times per day    allopurinol  200 mg Oral Daily    aspirin  81 mg Oral Daily    calcitRIOL  0.5 mcg Oral Daily    carvedilol  3.125 mg Oral BID WC    DULoxetine  60 mg Oral Daily    ferrous sulfate  325 mg Oral TID WC    isosorbide mononitrate  120 mg Oral Daily    ranolazine  500 mg Oral BID    vitamin C  500 mg Oral BID    dilTIAZem  120 mg Oral Daily    simethicone  80 mg Oral BID       Current Infused Meds:   lactated ringers      sodium chloride      sodium chloride      dilTIAZem 10 mg/hr (01/06/22 0628)    lactated ringers 100 mL/hr at 01/05/22 0811       Physical Exam:  Vitals:    01/06/22 0618   BP:    Pulse:    Resp: 16   Temp:    SpO2: 95%       Intake/Output Summary (Last 24 hours) at 1/6/2022 0919  Last data filed at 1/6/2022 0620  Gross per 24 hour   Intake    Output 800 ml   Net -800 ml     Estimated body mass index is 20.11 kg/m² as calculated from the following:    Height as of this encounter: 5' 11\" (1.803 m). Weight as of this encounter: 144 lb 3.2 oz (65.4 kg). Physical Exam  Vitals reviewed. Constitutional:       General: He is not in acute distress. Appearance: He is well-developed and normal weight. He is ill-appearing. He is not toxic-appearing or diaphoretic. HENT:      Head: Normocephalic and atraumatic. Nose: Nose normal.      Mouth/Throat:      Mouth: Mucous membranes are moist.      Pharynx: Oropharynx is clear. Eyes:      General: No scleral icterus. Extraocular Movements: Extraocular movements intact. Pupils: Pupils are equal, round, and reactive to light. Neck:      Vascular: No carotid bruit or JVD. Cardiovascular:      Rate and Rhythm: Normal rate and regular rhythm. Heart sounds: Normal heart sounds. No murmur heard. No friction rub. No gallop. Pulmonary:      Effort: Pulmonary effort is normal. No respiratory distress. Breath sounds: Normal breath sounds. No stridor. No wheezing, rhonchi or rales. Chest:      Chest wall: No tenderness. Abdominal:      General: Abdomen is flat. There is no distension. Palpations: Abdomen is soft. There is no mass. Tenderness: There is no abdominal tenderness. There is no right CVA tenderness, left CVA tenderness, guarding or rebound. Hernia: No hernia is present. Musculoskeletal:         General: No swelling, tenderness, deformity or signs of injury. Cervical back: Normal range of motion and neck supple. No rigidity or tenderness.       Right lower leg: No edema. Left lower leg: No edema. Lymphadenopathy:      Cervical: No cervical adenopathy. Skin:     General: Skin is warm and dry. Neurological:      General: No focal deficit present. Mental Status: He is alert and oriented to person, place, and time. Mental status is at baseline. Cranial Nerves: No cranial nerve deficit. Sensory: No sensory deficit. Motor: No weakness. Coordination: Coordination normal.   Psychiatric:         Mood and Affect: Mood normal.         Behavior: Behavior normal.         Thought Content: Thought content normal.         Judgment: Judgment normal.         Labs:  Recent Labs     01/05/22 0351 01/06/22 0211   WBC 5.8 5.3   HGB 11.0* 10.5*    349       Recent Labs     01/05/22 0351 01/06/22 0211    144   K 5.6* 4.7    103   CO2 13* 17*   * 65*   CREATININE 9.3* 6.6*   LABGLOM 6* 8*   CALCIUM 9.1 9.2   PHOS  --  9.2*       CK, CKMB, Troponin: @LABRCNT (CKTOTAL:3, CKMB:3, TROPONINI:3)@    Last 3 BNP:  No results for input(s): BNP in the last 72 hours. IMAGING:  XR ABDOMEN (2 VIEWS)    Result Date: 1/5/2022  Examination. XR ABDOMEN (2 VIEWS) 1/4/2022 11:18 PM History: Small bowel obstruction. Supine and upright views of the abdomen are obtained. There is no previous study for comparison. There is moderate dilatation of small bowel loops with air-fluid levels. The bowel loops measure up to 5.5 cm in diameter. The distal small bowel and in the right iliac fossa appears of normal size. The large bowel is decompressed. There is moderate amount of air in the stomach with air-fluid level. No evidence of free air. The kidneys are obscured by the bowel gas. No definite radiopaque calculi are seen. The limited visualized lungs appear unremarkable. Atheromatous changes of coronary arteries and the right coronary stenting is noted. No focal bony abnormality. 1. Distal small bowel obstruction.  Signed by Dr Davy Ferrer Anwer      Assessment:  1. End-stage renal disease on maintenance hemodialysis  2. Hypertension  3. Coronary artery disease  4. Diabetes  5. Previous umbilical hernia repair  6. Repair of 2 ventral hernias 12/29/2021  7. Exploratory laparotomy status post lysis of adhesions right lower quadrant mesh explantation 1/5/2022 no postoperative day #1  8. Tachycardia? Atrial flutter now on IV diltiazem  9. History of 8 stents previously placed [facility]  10. Remote myocardial infarction  11. Occasional chest pain at home noted. Recommendations:  1. Continue intravenous diltiazem transition to oral medications when able to take p.o. meds  2. Laboratory studies as ordered  3.  Will follow with you

## 2022-01-06 NOTE — PROGRESS NOTES
Contacted Dr. Dulce Lainez in regards cardio consult for pt. 's EKG done and Dr. Megan Perla. New orders for cardizem bolus and gtt ordered. Will continue to monitor.     Electronically signed by Natalio Lott RN on 1/6/2022 at 1:36 AM

## 2022-01-06 NOTE — PROGRESS NOTES
Nephrology ([address] [facility]) Progress Note    Patient:  Raciel Fernandez Sr.  YOB: 1945  Date of Service: 1/6/2022  MRN: [id number]   Acct: [de-identified]   Primary Care Physician: Chris Carvalho  Advance Directive: Full Code  Admit Date: 1/4/2022       Hospital Day: 2  Referring Provider: Estefani Griffith MD    Patient Seen, Chart, Consults notes, Labs, Radiology studies reviewed. Subjective:  Patient is a 60-year-old man with a past medical history of hypertension coronary disease and diabetes. Patient has been on chronic maintenance hemodialysis via a right IJ PermCath for the last 5 months. He is wanting to switch to home peritoneal dialysis. He has an abdominal wall hernia and needed repair before receiving a peritoneal dialysis catheter. He has a previous umbilical hernia repaired. Patient then underwent repair of 2 ventral hernia on 12/29. Shortly after his surgery, his abdomen was distended and he was not feeling well. He started having some loose bowel movement and was complaining of nausea. He presented to a local hospital and was then transferred to [facility] in [place]. Patient underwent on 1/5 exploratory laparotomy with lysis of small adhesion of the right lower quadrant as well as mesh explantation. Renal service was consulted to manage his ESRD. Patient has missed weeks of dialysis when he was not feeling well. On 1/5, his labs showed a serum potassium of 5.6 and a serum bicarbonate of 13 with BUN at 109 and creatinine at 9.3. Patient is s/p dialysis on 1/5. He continues to have some abdominal pain and has refused to come to dialysis today. Earlier, he went for a CT angiogram of the abdomen. He is suspected of inflammatory versus neoplastic process in the distal ileum. Allergies:  Patient has no known allergies.     Medicines:  Current Facility-Administered Medications   Medication Dose Route Frequency Provider Last Rate Last Admin    carvedilol (COREG) tablet 6.25 mg  6.25 mg Oral BID  Kristan Cartagena MD        lactated ringers infusion   IntraVENous Continuous Uyen Connolly MD        famotidine (PEPCID) injection 20 mg  20 mg IntraVENous Once Uyen Connolly MD        sodium chloride flush 0.9 % injection 5-40 mL  5-40 mL IntraVENous 2 times per day Abiel Rodriguez DO        sodium chloride flush 0.9 % injection 5-40 mL  5-40 mL IntraVENous PRN Luisa Almazan DO        0.9 % sodium chloride infusion  25 mL IntraVENous PRN Luisa Almazan DO        ondansetron (ZOFRAN-ODT) disintegrating tablet 4 mg  4 mg Oral Q8H PRN Luisa Almazan DO        Or    ondansetron [facility]) injection 4 mg  4 mg IntraVENous Q6H PRN Luisa Almazan,         heparin (porcine) injection 5,000 Units  5,000 Units SubCUTAneous 3 times per day Luisa Almazan DO   5,000 Units at 01/06/22 0553    oxyCODONE (ROXICODONE) immediate release tablet 5 mg  5 mg Oral Q4H PRN Luisa Almazan DO        HYDROmorphone HCl PF (DILAUDID) injection 0.5 mg  0.5 mg IntraVENous Q3H PRN Luisa Almazan DO   0.5 mg at 01/06/22 1236    allopurinol (ZYLOPRIM) tablet 200 mg  200 mg Oral Daily Abiel Rodriguez DO        aspirin chewable tablet 81 mg  81 mg Oral Daily Abiel Rodriguez DO        calcitRIOL (ROCALTROL) capsule 0.5 mcg  0.5 mcg Oral Daily Abiel Rodriguez DO        cloNIDine (CATAPRES) tablet 0.1 mg  0.1 mg Oral PRN Luisa Almazan DO        DULoxetine (CYMBALTA) extended release capsule 60 mg  60 mg Oral Daily Abiel Rodriguez DO        ferrous sulfate (IRON 325) tablet 325 mg  325 mg Oral TID  Luisa Almazan DO   325 mg at 01/05/22 1659    isosorbide mononitrate (IMDUR) extended release tablet 120 mg  120 mg Oral Daily Luisa Almazan DO   120 mg at 01/05/22 1456    ranolazine (RANEXA) extended release tablet 500 mg  500 mg Oral BID Luisa Almazan DO        ascorbic acid (VITAMIN C) tablet 500 mg  500 mg Oral BID Luisa Almazan DO  dilTIAZem (CARDIZEM CD) extended release capsule 120 mg  120 mg Oral Daily Leretha Lia, DO   120 mg at 01/05/22 1455    dilTIAZem 125 mg in dextrose 5 % 125 mL infusion  5-15 mg/hr IntraVENous Continuous General MD Bertha 10 mL/hr at 01/06/22 1122 10 mg/hr at 01/06/22 1122    lactated ringers infusion   IntraVENous Continuous Kassandra Fitzgerald  mL/hr at 01/05/22 0811 NoRateChange at 01/05/22 0811    ondansetron (ZOFRAN) injection 4 mg  4 mg IntraVENous Q6H PRN Leretha Lia, DO   4 mg at 01/04/22 2257    acetaminophen (TYLENOL) tablet 650 mg  650 mg Oral Q4H PRN Kassandra Fitzgerald, DO        HYDROmorphone HCl PF (DILAUDID) injection 0.5 mg  0.5 mg IntraVENous Q4H PRN Kassandra Fitzgerald, DO   0.5 mg at 01/06/22 0592    simethicone (MYLICON) chewable tablet 80 mg  80 mg Oral BID Kassandra Fitzgerald, DO   80 mg at 01/04/22 2315       Past Medical History:  Past Medical History:   Diagnosis Date    CAD (coronary artery disease)     Chronic kidney disease     [facility]     Heart disease     Hemodialysis patient ([facility]     Hyperlipidemia     Hypertension        Past Surgical History:  Past Surgical History:   Procedure Laterality Date    CORONARY ANGIOPLASTY WITH STENT PLACEMENT      LAPAROTOMY N/A 1/5/2022    LAPAROTOMY EXPLORATORY, LYSIS OF ADHESION performed by Kassandra Fitzgerald DO at [address] [place] [address] N/A 12/29/2021    LAPAROSCOPIC PRIMARY EPIGASTRIC HERNIA REPAIR WITH MESH performed by Kassandra Fitzgerald DO at [facility] OR       Family History  No family history on file.     Social History  Social History     Socioeconomic History    Marital status: Single     Spouse name: Not on file    Number of children: Not on file    Years of education: Not on file    Highest education level: Not on file   Occupational History    Not on file   Tobacco Use    Smoking status: Former Smoker    Smokeless tobacco: Never Used   Vaping Use    Vaping Use: Never used   Substance and Sexual Activity    Alcohol use: Never    Drug use: Never    Sexual activity: Not on file   Other Topics Concern    Not on file   Social History Narrative    Not on file     Social Determinants of Health     Financial Resource Strain:     Difficulty of Paying Living Expenses: Not on file   Food Insecurity:     Worried About Running Out of Food in the Last Year: Not on file    [place] of Food in the Last Year: Not on file   Transportation Needs:     Lack of Transportation (Medical): Not on file    Lack of Transportation (Non-Medical):  Not on file   Physical Activity:     Days of Exercise per Week: Not on file    Minutes of Exercise per Session: Not on file   Stress:     Feeling of Stress : Not on file   Social Connections:     Frequency of Communication with Friends and Family: Not on file    Frequency of Social Gatherings with Friends and Family: Not on file    Attends Islam Services: Not on file    Active Member of [address] [place] [address] or Organizations: Not on file    Attends Club or Organization Meetings: Not on file    Marital Status: Not on file   Intimate Partner Violence:     Fear of Current or Ex-Partner: Not on file    Emotionally Abused: Not on file    Physically Abused: Not on file    Sexually Abused: Not on file   Housing Stability:     Unable to Pay for Housing in the Last Year: Not on file    Number of [place] in the Last Year: Not on file    Unstable Housing in the Last Year: Not on file         Review of Systems:  History obtained from chart review and the patient  General ROS: No fever or chills  Respiratory ROS: No cough, +shortness of breath, -wheezing  Cardiovascular ROS: no chest pain but dyspnea on exertion  Gastrointestinal ROS: No abdominal pain or melena  Genito-Urinary ROS: No dysuria or hematuria  Musculoskeletal ROS: No joint pain or swelling       Objective:  Blood pressure (!) 142/75, pulse 97, temperature 96.3 °F (35.7 °C), temperature source Temporal, resp. rate 16, height 5' 11\" (1.803 m), weight 144 lb 3.2 oz (65.4 kg), SpO2 100 %. Intake/Output Summary (Last 24 hours) at 1/6/2022 1339  Last data filed at 1/6/2022 0620  Gross per 24 hour   Intake    Output 800 ml   Net -800 ml     General: alert and oriented x3   Chest: Bilateral air entry with scattered rales and diminished breath sounds in the bases  CVS: regular rate and rhythm  Abdominal: soft, nontender, normal bowel sounds  Extremities: no cyanosis trace leg edema  Skin: warm and dry without rash    Labs:  BMP:   Recent Labs     01/05/22 0351 01/06/22 0211    144   K 5.6* 4.7    103   CO2 13* 17*   PHOS  --  9.2*   * 65*   CREATININE 9.3* 6.6*   CALCIUM 9.1 9.2     CBC:   Recent Labs     01/05/22 0351 01/06/22 0211   WBC 5.8 5.3   HGB 11.0* 10.5*   HCT 34.8* 32.4*   MCV 98.6* 96.1*    349     LIVER PROFILE:   Recent Labs     01/05/22 0351   AST 29   ALT 10   BILITOT 0.3   ALKPHOS 85     PT/INR: No results for input(s): PROTIME, INR in the last 72 hours. APTT: No results for input(s): APTT in the last 72 hours. BNP:  No results for input(s): BNP in the last 72 hours. Ionized Calcium:No results for input(s): IONCA in the last 72 hours. Magnesium:No results for input(s): MG in the last 72 hours. Phosphorus:  Recent Labs     01/06/22 0211   PHOS 9.2*     HgbA1C: No results for input(s): LABA1C in the last 72 hours. Hepatic:   Recent Labs     01/05/22 0351   ALKPHOS 85   ALT 10   AST 29   PROT 6.3*   BILITOT 0.3   LABALBU 3.7     Lactic Acid: No results for input(s): LACTA in the last 72 hours. Troponin: No results for input(s): CKTOTAL, CKMB, TROPONINT in the last 72 hours. ABGs: No results for input(s): PH, PCO2, PO2, HCO3, O2SAT in the last 72 hours. CRP:  No results for input(s): CRP in the last 72 hours. Sed Rate:  No results for input(s): SEDRATE in the last 72 hours.     Cultures:   No results for input(s): CULTURE in the last 72 hours. Radiology reports as per the Radiologist  Radiology: XR ABDOMEN (2 VIEWS)    Result Date: 1/5/2022  Examination. XR ABDOMEN (2 VIEWS) 1/4/2022 11:18 PM History: Small bowel obstruction. Supine and upright views of the abdomen are obtained. There is no previous study for comparison. There is moderate dilatation of small bowel loops with air-fluid levels. The bowel loops measure up to 5.5 cm in diameter. The distal small bowel and in the right iliac fossa appears of normal size. The large bowel is decompressed. There is moderate amount of air in the stomach with air-fluid level. No evidence of free air. The kidneys are obscured by the bowel gas. No definite radiopaque calculi are seen. The limited visualized lungs appear unremarkable. Atheromatous changes of coronary arteries and the right coronary stenting is noted. No focal bony abnormality. 1. Distal small bowel obstruction. Signed by Dr Paris Brennan   1. ESRD  2. Type 2 DM with renal disease  3. Repair of ventral hernia  4. Abdominal pain  5. Nausea  6. Hyperkalemia  7. Metabolic acidosis      Plan:  Patient was encouraged to be more compliant with dialysis. We will offer dialysis in a.m. although patient has clearly said he wanted to get his next dialysis on January 8 which is in 2 days. Follow up labs.

## 2022-01-06 NOTE — PROGRESS NOTES
Notified General Surgery re:abnormal CTA of abdomen. Dr. Juan Miguel Bolden states she will review report and stop in to see the patient this afternoon.

## 2022-01-07 ENCOUNTER — APPOINTMENT (OUTPATIENT)
Dept: CT IMAGING | Age: 77
DRG: 356 | End: 2022-01-07
Attending: SURGERY
Payer: OTHER GOVERNMENT

## 2022-01-07 ENCOUNTER — APPOINTMENT (OUTPATIENT)
Dept: GENERAL RADIOLOGY | Age: 77
DRG: 356 | End: 2022-01-07
Attending: SURGERY
Payer: OTHER GOVERNMENT

## 2022-01-07 LAB
ANION GAP SERPL CALCULATED.3IONS-SCNC: 24 MMOL/L (ref 7–19)
BUN BLDV-MCNC: 98 MG/DL (ref 8–23)
CALCIUM SERPL-MCNC: 9.1 MG/DL (ref 8.8–10.2)
CHLORIDE BLD-SCNC: 98 MMOL/L (ref 98–111)
CO2: 18 MMOL/L (ref 22–29)
CREAT SERPL-MCNC: 9 MG/DL (ref 0.5–1.2)
GFR AFRICAN AMERICAN: 7
GFR NON-AFRICAN AMERICAN: 6
GLUCOSE BLD-MCNC: 118 MG/DL (ref 74–109)
HCT VFR BLD CALC: 30.4 % (ref 42–52)
HEMOGLOBIN: 10.2 G/DL (ref 14–18)
MCH RBC QN AUTO: 31.3 PG (ref 27–31)
MCHC RBC AUTO-ENTMCNC: 33.6 G/DL (ref 33–37)
MCV RBC AUTO: 93.3 FL (ref 80–94)
PDW BLD-RTO: 14.9 % (ref 11.5–14.5)
PLATELET # BLD: 356 K/UL (ref 130–400)
PMV BLD AUTO: 10.3 FL (ref 9.4–12.4)
POTASSIUM REFLEX MAGNESIUM: 5 MMOL/L (ref 3.5–5)
RBC # BLD: 3.26 M/UL (ref 4.7–6.1)
SODIUM BLD-SCNC: 140 MMOL/L (ref 136–145)
TROPONIN: 0.22 NG/ML (ref 0–0.03)
WBC # BLD: 7.2 K/UL (ref 4.8–10.8)

## 2022-01-07 PROCEDURE — 6360000004 HC RX CONTRAST MEDICATION: Performed by: INTERNAL MEDICINE

## 2022-01-07 PROCEDURE — 2500000003 HC RX 250 WO HCPCS: Performed by: INTERNAL MEDICINE

## 2022-01-07 PROCEDURE — 6360000002 HC RX W HCPCS: Performed by: INTERNAL MEDICINE

## 2022-01-07 PROCEDURE — 99024 POSTOP FOLLOW-UP VISIT: CPT | Performed by: SURGERY

## 2022-01-07 PROCEDURE — 80048 BASIC METABOLIC PNL TOTAL CA: CPT

## 2022-01-07 PROCEDURE — 2580000003 HC RX 258: Performed by: SURGERY

## 2022-01-07 PROCEDURE — 2580000003 HC RX 258: Performed by: INTERNAL MEDICINE

## 2022-01-07 PROCEDURE — 2140000000 HC CCU INTERMEDIATE R&B

## 2022-01-07 PROCEDURE — 99232 SBSQ HOSP IP/OBS MODERATE 35: CPT | Performed by: INTERNAL MEDICINE

## 2022-01-07 PROCEDURE — 2700000000 HC OXYGEN THERAPY PER DAY

## 2022-01-07 PROCEDURE — 6360000002 HC RX W HCPCS: Performed by: SURGERY

## 2022-01-07 PROCEDURE — 85027 COMPLETE CBC AUTOMATED: CPT

## 2022-01-07 PROCEDURE — 71275 CT ANGIOGRAPHY CHEST: CPT

## 2022-01-07 PROCEDURE — 71045 X-RAY EXAM CHEST 1 VIEW: CPT

## 2022-01-07 PROCEDURE — 84484 ASSAY OF TROPONIN QUANT: CPT

## 2022-01-07 PROCEDURE — 8010000000 HC HEMODIALYSIS ACUTE INPT

## 2022-01-07 PROCEDURE — 36415 COLL VENOUS BLD VENIPUNCTURE: CPT

## 2022-01-07 PROCEDURE — 6370000000 HC RX 637 (ALT 250 FOR IP): Performed by: SURGERY

## 2022-01-07 RX ORDER — LIDOCAINE HYDROCHLORIDE 20 MG/ML
JELLY TOPICAL ONCE
Status: COMPLETED | OUTPATIENT
Start: 2022-01-07 | End: 2022-01-07

## 2022-01-07 RX ORDER — OXYMETAZOLINE HYDROCHLORIDE 0.05 G/100ML
2 SPRAY NASAL ONCE
Status: COMPLETED | OUTPATIENT
Start: 2022-01-07 | End: 2022-01-07

## 2022-01-07 RX ORDER — HEPARIN SODIUM 1000 [USP'U]/ML
3600 INJECTION, SOLUTION INTRAVENOUS; SUBCUTANEOUS
Status: COMPLETED | OUTPATIENT
Start: 2022-01-07 | End: 2022-01-07

## 2022-01-07 RX ADMIN — OXYMETAZOLINE HCL 2 SPRAY: 0.05 SPRAY NASAL at 11:10

## 2022-01-07 RX ADMIN — HEPARIN SODIUM 3600 UNITS: 1000 INJECTION INTRAVENOUS; SUBCUTANEOUS at 16:49

## 2022-01-07 RX ADMIN — DILTIAZEM HYDROCHLORIDE 10 MG/HR: 5 INJECTION INTRAVENOUS at 11:11

## 2022-01-07 RX ADMIN — IOPAMIDOL 95 ML: 755 INJECTION, SOLUTION INTRAVENOUS at 10:13

## 2022-01-07 RX ADMIN — SODIUM CHLORIDE, PRESERVATIVE FREE 10 ML: 5 INJECTION INTRAVENOUS at 22:20

## 2022-01-07 RX ADMIN — HEPARIN SODIUM 5000 UNITS: 5000 INJECTION INTRAVENOUS; SUBCUTANEOUS at 05:49

## 2022-01-07 RX ADMIN — LIDOCAINE HYDROCHLORIDE: 20 JELLY TOPICAL at 11:09

## 2022-01-07 ASSESSMENT — PAIN SCALES - GENERAL: PAINLEVEL_OUTOF10: 0

## 2022-01-07 NOTE — PROGRESS NOTES
Comprehensive Nutrition Assessment    Type and Reason for Visit:  Reassess    Nutrition Recommendations/Plan: follow for advancing diet or initiation of alternate source of nutrition    Nutrition Assessment:  Pt remains NPO. Aware of c/o abd distention. Dialysis continues--did refuse yesterday. Starting NPO x 3 days--since admission    Malnutrition Assessment:  Malnutrition Status:   At risk for malnutrition (Comment)    Context:  Acute Illness     Findings of the 6 clinical characteristics of malnutrition:  Energy Intake:  Mild decrease in energy intake (Comment)  Weight Loss:  No significant weight loss     Body Fat Loss:  1 - Mild body fat loss Orbital   Muscle Mass Loss:  No significant muscle mass loss    Fluid Accumulation:  No significant fluid accumulation     Strength:  Not Performed    Estimated Daily Nutrient Needs:  Energy (kcal):  3319-4149 kcals/day; Weight Used for Energy Requirements:  Current (25-30)     Protein (g):  105-140 g/PRO/day; Weight Used for Protein Requirements:  Current (1.5-2.0)        Fluid (ml/day):  2706-8052 mL/day; Method Used for Fluid Requirements:  1 ml/kcal      Nutrition Related Findings:  thin appearing      Wounds:  Surgical Incision       Current Nutrition Therapies:    Diet NPO Exceptions are: Sips of Water with Meds, Ice Chips, Popsicles, Sips of Clear Liquids    Anthropometric Measures:  · Height: 5' 11\" (180.3 cm)  · Current Body Weight: 150 lb (68 kg)   · Admission Body Weight: 144 lb 3.2 oz (65.4 kg)    · Usual Body Weight: 145 lb 3.2 oz (65.9 kg)     · Ideal Body Weight: 172 lbs; % Ideal Body Weight 87.2 %   · BMI: 20.9  · Adjusted Body Weight:  ; No Adjustment   · BMI Categories: Underweight (BMI less than 22) age over 72       Nutrition Diagnosis:   · Inadequate oral intake related to acute injury/trauma as evidenced by NPO or clear liquid status due to medical condition,mild loss of subcutaneous fat      Nutrition Interventions:   Food and/or Nutrient Delivery:  Continue NPO  Nutrition Education/Counseling:  No recommendation at this time,Education not indicated   Coordination of Nutrition Care:  Continue to monitor while inpatient    Goals:  Pt will progress to an oral diet to meet nutritional needs       Nutrition Monitoring and Evaluation:   Behavioral-Environmental Outcomes:  None Identified   Food/Nutrient Intake Outcomes:  Diet Advancement/Tolerance  Physical Signs/Symptoms Outcomes:  Biochemical Data,Weight,Skin,Nutrition Focused Physical Findings,Fluid Status or Edema     Discharge Planning:     Too soon to determine     Electronically signed by Zaid Noel MS, RD, LD on 1/7/22 at 8:49 AM CST    Contact: [phone]

## 2022-01-07 NOTE — PROGRESS NOTES
Nephrology ([address] [facility]) Progress Note    Patient:  Fanny Vinson Sr.  YOB: 1945  Date of Service: 1/7/2022  MRN: [id number]   Acct: [de-identified]   Primary Care Physician: Walt Rodriguez  Advance Directive: Full Code  Admit Date: 1/4/2022       Hospital Day: 3  Referring Provider: Jannet Billy MD    Patient Seen, Chart, Consults notes, Labs, Radiology studies reviewed. Subjective:  Patient is a 50-year-old man with a past medical history of hypertension coronary disease and diabetes. Patient has been on chronic maintenance hemodialysis via a right IJ PermCath for the last 5 months. He is wanting to switch to home peritoneal dialysis. He has an abdominal wall hernia and needed repair before receiving a peritoneal dialysis catheter. He has a previous umbilical hernia repaired. Patient then underwent repair of 2 ventral hernias on 12/29. He then felt distended and he was not feeling well. He started having some loose bowel movements and was complaining of nausea. He presented to a local hospital and was then transferred to [facility] in [place]. Patient underwent on 1/5 exploratory laparotomy with lysis of small adhesion of the right lower quadrant as well as mesh explantation. Renal service was consulted to manage his ESRD. Patient has missed weeks of dialysis when he was not feeling well. On 1/5, his labs showed a serum potassium of 5.6 and a serum bicarbonate of 13 with BUN at 109 and creatinine at 9.3. Patient is s/p dialysis on 1/5. He continues to have some abdominal pain and has refused to come to dialysis on 1/6. Today, he continues to feel poor. He is believed to be in ileus. We talked to him about doing dialysis and he agreed for 2 1/2 hrs today. Allergies:  Patient has no known allergies.     Medicines:  Current Facility-Administered Medications   Medication Dose Route Frequency Provider Last Rate Last Admin    carvedilol (COREG) tablet 6.25 mg  6.25 mg Oral BID  Angel Luis Villarreal MD        famotidine (PEPCID) injection 20 mg  20 mg IntraVENous Once Michael Smith MD        sodium chloride flush 0.9 % injection 5-40 mL  5-40 mL IntraVENous 2 times per day Abiel Rodriguez DO        sodium chloride flush 0.9 % injection 5-40 mL  5-40 mL IntraVENous PRN [facility] Rummage, DO        0.9 % sodium chloride infusion  25 mL IntraVENous PRN [facility] Rumm[facility], DO        ondansetron (ZOFRAN-ODT) disintegrating tablet 4 mg  4 mg Oral Q8H PRN Destiny Rummage, DO        Or    ondansetron [facility]) injection 4 mg  4 mg IntraVENous Q6H PRN [facility] Rumm[facility], DO        heparin (porcine) injection 5,000 Units  5,000 Units SubCUTAneous 3 times per day [facility] Rummage, DO   5,000 Units at 01/07/22 0549    oxyCODONE (ROXICODONE) immediate release tablet 5 mg  5 mg Oral Q4H PRN [facility] Rummage, DO        HYDROmorphone HCl PF (DILAUDID) injection 0.5 mg  0.5 mg IntraVENous Q3H PRN [facility] Rummage, DO   0.5 mg at 01/06/22 1542    allopurinol (ZYLOPRIM) tablet 200 mg  200 mg Oral Daily Abiel Rodriguez DO        aspirin chewable tablet 81 mg  81 mg Oral Daily Abiel Rodriguez DO        calcitRIOL (ROCALTROL) capsule 0.5 mcg  0.5 mcg Oral Daily Abiel Rodriguez DO        cloNIDine (CATAPRES) tablet 0.1 mg  0.1 mg Oral PRN [facility], DO        DULoxetine (CYMBALTA) extended release capsule 60 mg  60 mg Oral Daily Abiel Rodriguez DO        ferrous sulfate (IRON 325) tablet 325 mg  325 mg Oral TID [facility] Rummage, DO   325 mg at 01/05/22 1659    isosorbide mononitrate (IMDUR) extended release tablet 120 mg  120 mg Oral Daily Destiny Rummage, DO   120 mg at 01/05/22 1456    ranolazine (RANEXA) extended release tablet 500 mg  500 mg Oral BID [facility] R[facility], DO        ascorbic acid (VITAMIN C) tablet 500 mg  500 mg Oral BID [facility] R[facility], DO        dilTIAZem (CARDIZEM CD) extended release capsule 120 mg  120 mg Oral Daily Destiny Maldonado, DO   120 mg at 01/05/22 1455    dilTIAZem 125 mg in dextrose 5 % 125 mL infusion  5-15 mg/hr IntraVENous Continuous Shawn Alvarez MD 10 mL/hr at 01/07/22 1111 10 mg/hr at 01/07/22 1111    lactated ringers infusion   IntraVENous Continuous Segundo Simpson  mL/hr at 01/05/22 0811 NoRateChange at 01/05/22 0811    ondansetron (ZOFRAN) injection 4 mg  4 mg IntraVENous Q6H PRN Segundo Simpson, DO   4 mg at 01/04/22 2257    acetaminophen (TYLENOL) tablet 650 mg  650 mg Oral Q4H PRN Segundo Simpson, DO        HYDROmorphone HCl PF (DILAUDID) injection 0.5 mg  0.5 mg IntraVENous Q4H PRN Segundo Simpson, DO   0.5 mg at 01/06/22 0553    simethicone (MYLICON) chewable tablet 80 mg  80 mg Oral BID Segundo Simpson, DO   80 mg at 01/04/22 4519       Past Medical History:  Past Medical History:   Diagnosis Date    CAD (coronary artery disease)     Chronic kidney disease     Diabetes [facility] ([facility]     Heart disease     Hemodialysis patient ([facility]     Hyperlipidemia     Hypertension        Past Surgical History:  Past Surgical History:   Procedure Laterality Date    CORONARY ANGIOPLASTY WITH STENT PLACEMENT      LAPAROTOMY N/A 1/5/2022    LAPAROTOMY EXPLORATORY, LYSIS OF ADHESION performed by Segundo Simpson DO at [address] [place] [address] N/A 12/29/2021    LAPAROSCOPIC PRIMARY EPIGASTRIC HERNIA REPAIR WITH MESH performed by Segundo Simpson DO at [facility] History  No family history on file.     Social History  Social History     Socioeconomic History    Marital status: Single     Spouse name: Not on file    Number of children: Not on file    Years of education: Not on file    Highest education level: Not on file   Occupational History    Not on file   Tobacco Use    Smoking status: Former Smoker    Smokeless tobacco: Never Used   Vaping Use    Vaping Use: Never used   Substance and Sexual Activity    Alcohol use: Never    Drug use: Never    Sexual activity: Not on file   Other Topics Concern    Not on file   Social History Narrative    Not on file     Social Determinants of Health     Financial Resource Strain:     Difficulty of Paying Living Expenses: Not on file   Food Insecurity:     Worried About Running Out of Food in the Last Year: Not on file    [place] of Food in the Last Year: Not on file   Transportation Needs:     Lack of Transportation (Medical): Not on file    Lack of Transportation (Non-Medical): Not on file   Physical Activity:     Days of Exercise per Week: Not on file    Minutes of Exercise per Session: Not on file   Stress:     Feeling of Stress : Not on file   Social Connections:     Frequency of Communication with Friends and Family: Not on file    Frequency of Social Gatherings with Friends and Family: Not on file    Attends Yazdanism Services: Not on file    Active Member of [address] [place] [address] [employer] or Organizations: Not on file    Attends Club or Organization Meetings: Not on file    Marital Status: Not on file   Intimate Partner Violence:     Fear of Current or Ex-Partner: Not on file    Emotionally Abused: Not on file    Physically Abused: Not on file    Sexually Abused: Not on file   Housing Stability:     Unable to Pay for Housing in the Last Year: Not on file    Number of [place] in the Last Year: Not on file    Unstable Housing in the Last Year: Not on file         Review of Systems:  History obtained from chart review and the patient  General ROS: No fever or chills  Respiratory ROS: No cough, +shortness of breath, -wheezing  Cardiovascular ROS: no chest pain but dyspnea on exertion  Gastrointestinal ROS: + abdominal pain, no melena  Genito-Urinary ROS: No dysuria or hematuria  Musculoskeletal ROS: No joint pain or swelling       Objective:  Blood pressure (!) 161/89, pulse 101, temperature 96 °F (35.6 °C), temperature source Temporal, resp.  rate 18, height 5' 11\" (1.803 m), weight 150 lb (68 kg), SpO2 96 %.    Intake/Output Summary (Last 24 hours) at 1/7/2022 1138  Last data filed at 1/7/2022 1106  Gross per 24 hour   Intake    Output 900 ml   Net -900 ml     General: alert and oriented x3   Chest: Bilateral air entry with scattered rales and diminished breath sounds in the bases  CVS: regular rate and rhythm  Abdominal:distended, no guaridng  Extremities: no cyanosis trace leg edema  Skin: warm and dry without rash    Labs:  BMP:   Recent Labs     01/05/22 0351 01/06/22 0211 01/07/22  0303    144 140   K 5.6* 4.7 5.0    103 98   CO2 13* 17* 18*   PHOS  --  9.2*  --    * 65* 98*   CREATININE 9.3* 6.6* 9.0*   CALCIUM 9.1 9.2 9.1     CBC:   Recent Labs     01/05/22 0351 01/06/22 0211 01/07/22  0303   WBC 5.8 5.3 7.2   HGB 11.0* 10.5* 10.2*   HCT 34.8* 32.4* 30.4*   MCV 98.6* 96.1* 93.3    349 356     LIVER PROFILE:   Recent Labs     01/05/22 0351   AST 29   ALT 10   BILITOT 0.3   ALKPHOS 85     PT/INR: No results for input(s): PROTIME, INR in the last 72 hours. APTT: No results for input(s): APTT in the last 72 hours. BNP:  No results for input(s): BNP in the last 72 hours. Ionized Calcium:No results for input(s): IONCA in the last 72 hours. Magnesium:  Recent Labs     01/06/22  1254   MG 1.9     Phosphorus:  Recent Labs     01/06/22 0211   PHOS 9.2*     HgbA1C: No results for input(s): LABA1C in the last 72 hours. Hepatic:   Recent Labs     01/05/22 0351   ALKPHOS 85   ALT 10   AST 29   PROT 6.3*   BILITOT 0.3   LABALBU 3.7     Lactic Acid: No results for input(s): LACTA in the last 72 hours. Troponin: No results for input(s): CKTOTAL, CKMB, TROPONINT in the last 72 hours. ABGs: No results for input(s): PH, PCO2, PO2, HCO3, O2SAT in the last 72 hours. CRP:  No results for input(s): CRP in the last 72 hours. Sed Rate:  No results for input(s): SEDRATE in the last 72 hours. Cultures:   No results for input(s): CULTURE in the last 72 hours.     Radiology reports as per the Radiologist  Radiology: XR ABDOMEN (2 VIEWS)    Result Date: 1/5/2022  Examination. XR ABDOMEN (2 VIEWS) 1/4/2022 11:18 PM History: Small bowel obstruction. Supine and upright views of the abdomen are obtained. There is no previous study for comparison. There is moderate dilatation of small bowel loops with air-fluid levels. The bowel loops measure up to 5.5 cm in diameter. The distal small bowel and in the right iliac fossa appears of normal size. The large bowel is decompressed. There is moderate amount of air in the stomach with air-fluid level. No evidence of free air. The kidneys are obscured by the bowel gas. No definite radiopaque calculi are seen. The limited visualized lungs appear unremarkable. Atheromatous changes of coronary arteries and the right coronary stenting is noted. No focal bony abnormality. 1. Distal small bowel obstruction. Signed by Dr Regan Feliciano   1. ESRD  2. Type 2 DM with renal disease  3. Repair of ventral hernia  4. Abdominal pain  5. Nausea  6. Hyperkalemia  7. Metabolic acidosis  8. Post-operative ileus      Plan: We will offer dialysis today. He will have an NGtube. Follow up labs. Discussed with Dr. Veronica Romero.

## 2022-01-07 NOTE — PROGRESS NOTES
[facility] General Surgery Progress Note    Chief Complaint:  No chief complaint on file. POD # 2 Ex Lap, BERYL of RLQ and Explantation of Epigastric mesh    S: Still distended. Seems more lethargic today. Patient continuing to refuse dialysis. Wife states at this point that he did not dialyze for multiple weeks before his hernia surgery. O:   BP (!) 161/89   Pulse 101   Temp 96 °F (35.6 °C) (Temporal)   Resp 18   Ht 5' 11\" (1.803 m)   Wt 150 lb (68 kg)   SpO2 96%   BMI 20.92 kg/m²   I/O reviewed and appropriate  CONSTITUTIONAL: Alert, ill appearing  SKIN: warm, dry with no rashes or lesions  HEENT:  PERR. Trachea Midline. CHEST/LUNGS: Normal respiratory effort. CARDIOVASCULAR: No edema. ABDOMEN: Distended, yet appropriate TTP. Incisions: dressing in place  NEUROLOGIC: CN II-XI grossly intact, no motor or sensory deficits   MUSCULOSKELETAL: No clubbing or cyanosis. PSYCHIATRIC:  Normal Mood and Affect. Alert and Oriented. LABS:   CBC:   Recent Labs     01/05/22 0351 01/06/22 0211 01/07/22  0303   WBC 5.8 5.3 7.2   RBC 3.53* 3.37* 3.26*   HGB 11.0* 10.5* 10.2*   HCT 34.8* 32.4* 30.4*    349 356   BANDSPCT  --  33*  --    LYMPHOPCT 13.0* 10.0*  --    MONOPCT 11.8* 3.0  --    BASOPCT 0.2 0.0  --    MONOSABS 0.70 0.20  --    LYMPHSABS 0.8* 0.5*  --    EOSABS 0.00 0.00  --    BASOSABS 0.00 0.00  --       BMP:   Recent Labs     01/05/22 0351 01/06/22 0211 01/07/22  0303    144 140   K 5.6* 4.7 5.0    103 98   CO2 13* 17* 18*   ANIONGAP 21* 24* 24*   GLUCOSE 115* 134* 118*   CREATININE 9.3* 6.6* 9.0*   LABGLOM 6* 8* 6*   CALCIUM 9.1 9.2 9.1     LFT:   Recent Labs     01/05/22  0351   PROT 6.3*   LABALBU 3.7   BILITOT 0.3   ALKPHOS 85   ALT 10   AST 29       A: Active Problems:    Atrial flutter with rapid ventricular response (HCC)    Small bowel obstruction (HCC)    ESRD (end stage renal disease) (HCC)  Resolved Problems:    * No resolved hospital problems.

## 2022-01-07 NOTE — PROGRESS NOTES
Daily Progress Note    Date:2022  Patient: Merri Bence Sr.  : 1945  URF:[id number]  CODE:Full Code No additional code details  PCP:Hermes Kc    Admit Date: 2022  8:55 PM   LOS: 3 days       Subjective: Patient somnolent today. Recently refused dialysis but agreed to dialysis today after discussion with nephrology. He continues to have some shortness of breath and abdominal pain.       Review of Systems    Comprehensive ROS completed and is negative except as otherwise noted      Objective:      Vital signs in last 24 hours:  Patient Vitals for the past 24 hrs:   BP Temp Temp src Pulse Resp SpO2 Height Weight   22 1729 138/74 97.4 °F (36.3 °C) Temporal 98 20 96 %     22 0842       5' 11\" (1.803 m)    22 0534 (!) 161/89 96 °F (35.6 °C) Temporal 101 18 96 %  150 lb (68 kg)   22 0035 (!) 145/74 96.4 °F (35.8 °C) Temporal 103 18 97 %       Physical exam    General: Ill-appearing, no distress  HEENT: NC/AT, no scleral icterus  Neck: Trachea midline  Respiratory: Normal respiratory effort, no wheezes, diminished breath sounds at bases  Cardiovascular: Normal rhythm, pulses 2+ and equal  Abdomen: Tenderness noted without guarding or rebound  Musculoskeletal: No deformities  Skin: Warm and dry      Lab Review   Recent Results (from the past 24 hour(s))   CBC    Collection Time: 22  3:03 AM   Result Value Ref Range    WBC 7.2 4.8 - 10.8 K/uL    RBC 3.26 (L) 4.70 - 6.10 M/uL    Hemoglobin 10.2 (L) 14.0 - 18.0 g/dL    Hematocrit 30.4 (L) 42.0 - 52.0 %    MCV 93.3 80.0 - 94.0 fL    MCH 31.3 (H) 27.0 - 31.0 pg    MCHC 33.6 33.0 - 37.0 g/dL    RDW 14.9 (H) 11.5 - 14.5 %    Platelets 529 167 - 154 K/uL    MPV 10.3 9.4 - 12.4 fL   Basic Metabolic Panel w/ Reflex to MG    Collection Time: 22  3:03 AM   Result Value Ref Range    Sodium 140 136 - 145 mmol/L    Potassium reflex Magnesium 5.0 3.5 - 5.0 mmol/L    Chloride 98 98 - 111 mmol/L    CO2 18 (L) 22 - 29 mmol/L Anion Gap 24 (H) 7 - 19 mmol/L    Glucose 118 (H) 74 - 109 mg/dL    BUN 98 (H) 8 - 23 mg/dL    CREATININE 9.0 (H) 0.5 - 1.2 mg/dL    GFR Non- 6 (A) >60    GFR  7 (L) >59    Calcium 9.1 8.8 - 10.2 mg/dL   Troponin    Collection Time: 01/07/22  9:26 AM   Result Value Ref Range    Troponin 0.22 (HH) 0.00 - 0.03 ng/mL       I/O last 3 completed shifts:  In: -   Out: 850 [Urine:850]  I/O this shift:  In: -   Out: 850 [Emesis/NG output:850]      Current Facility-Administered Medications:     carvedilol (COREG) tablet 6.25 mg, 6.25 mg, Oral, BID WC, Skye Navas MD    famotidine (PEPCID) injection 20 mg, 20 mg, IntraVENous, Once, Keli Bourgeois MD    sodium chloride flush 0.9 % injection 5-40 mL, 5-40 mL, IntraVENous, 2 times per day, Abiel Rodriguez DO    sodium chloride flush 0.9 % injection 5-40 mL, 5-40 mL, IntraVENous, PRN, Abiel Rodriguez DO    0.9 % sodium chloride infusion, 25 mL, IntraVENous, PRN, Abiel Rodriguez DO    ondansetron (ZOFRAN-ODT) disintegrating tablet 4 mg, 4 mg, Oral, Q8H PRN **OR** ondansetron (ZOFRAN) injection 4 mg, 4 mg, IntraVENous, Q6H PRN, Sherryle South, DO    heparin (porcine) injection 5,000 Units, 5,000 Units, SubCUTAneous, 3 times per day, Sherrybrody James, DO, 5,000 Units at 01/07/22 0593    oxyCODONE (ROXICODONE) immediate release tablet 5 mg, 5 mg, Oral, Q4H PRN, Sherryle Jacob, DO    HYDROmorphone HCl PF (DILAUDID) injection 0.5 mg, 0.5 mg, IntraVENous, Q3H PRN, Sherryle South, DO, 0.5 mg at 01/06/22 1543    allopurinol (ZYLOPRIM) tablet 200 mg, 200 mg, Oral, Daily, Abiel Rodriguez DO    aspirin chewable tablet 81 mg, 81 mg, Oral, Daily, Abiel Rodriguez DO    calcitRIOL (ROCALTROL) capsule 0.5 mcg, 0.5 mcg, Oral, Daily, Abiel Rodriguez DO    cloNIDine (CATAPRES) tablet 0.1 mg, 0.1 mg, Oral, PRN, Sherryle South, DO    DULoxetine (CYMBALTA) extended release capsule 60 mg, 60 mg, Oral, Daily, Abiel MEMBRENO Kristen Morales DO    ferrous sulfate (IRON 325) tablet 325 mg, 325 mg, Oral, TID WC, Erica Kohli, DO, 325 mg at 01/05/22 1659    isosorbide mononitrate (IMDUR) extended release tablet 120 mg, 120 mg, Oral, Daily, Abiel Rodriguez DO, 120 mg at 01/05/22 1456    ranolazine (RANEXA) extended release tablet 500 mg, 500 mg, Oral, BID, Abiel Rodriguez DO    ascorbic acid (VITAMIN C) tablet 500 mg, 500 mg, Oral, BID, Abiel Rodriguez DO    dilTIAZem (CARDIZEM CD) extended release capsule 120 mg, 120 mg, Oral, Daily, Abiel Rodriguez DO, 120 mg at 01/05/22 1455    [COMPLETED] dilTIAZem injection 10 mg, 10 mg, IntraVENous, Once, 10 mg at 01/05/22 2214 **FOLLOWED BY** dilTIAZem 125 mg in dextrose 5 % 125 mL infusion, 5-15 mg/hr, IntraVENous, Continuous, Jairo Schmitt MD, Last Rate: 10 mL/hr at 01/07/22 1111, 10 mg/hr at 01/07/22 1111    lactated ringers infusion, , IntraVENous, Continuous, Erica Kohli DO, Last Rate: 100 mL/hr at 01/05/22 0811, NoRateChange at 01/05/22 0811    ondansetron (ZOFRAN) injection 4 mg, 4 mg, IntraVENous, Q6H PRN, Erica Kohli DO, 4 mg at 01/04/22 2257    acetaminophen (TYLENOL) tablet 650 mg, 650 mg, Oral, Q4H PRN, Erica Kohli, DO    HYDROmorphone HCl PF (DILAUDID) injection 0.5 mg, 0.5 mg, IntraVENous, Q4H PRN, Erica Kohli DO, 0.5 mg at 01/06/22 0548    simethicone (MYLICON) chewable tablet 80 mg, 80 mg, Oral, BID, Abiel Rodriguez DO, 80 mg at 01/04/22 2315      Assessment/plan  Active Problems:    Atrial flutter with rapid ventricular response (HCC)    Small bowel obstruction (HCC)    ESRD (end stage renal disease) ([address]  Resolved Problems:    * No resolved hospital problems. *      SBO/ileus  N. p.o. okay for sips with meds  NG tube  Care per general surgery    Atrial flutter with rapid response  Back in sinus rhythm  Continue Cardizem drip with resumption of p.o. AV sejal blocking agents, wean off Cardizem drip once better rate controlled  Cardiology following    ESRD  Nephrology managing dialytic needs        Olivia Vasquez MD 1/7/2022 5:38 PM

## 2022-01-07 NOTE — PROGRESS NOTES
Cardiology Daily Note Hiral Pan MD      Patient:  Evie Waddell  [id number]    Patient Active Problem List    Diagnosis Date Noted    Atrial flutter with rapid ventricular response ([address] 01/06/2022    ESRD (end stage renal disease) ([address] 01/06/2022    Small bowel obstruction ([address] 01/05/2022       Admit Date:  1/4/2022    Admission Problem List: Present on Admission:   Small bowel obstruction ([address]   Atrial flutter with rapid ventricular response ([address]   ESRD (end stage renal disease) ([address]      Cardiac Specific Data:  Specialty Problems        Cardiology Problems    Atrial flutter with rapid ventricular response ([address]              Subjective:  Mr. Lynette Martinez seen today resting comfortably still reportedly has an ileus not yet taking p.o. medicines EKG confirms sinus rhythm he remains on IV diltiazem. Blood pressure 161/89 heart rate 101 no other complaints or issues reported. Objective:   BP (!) 161/89   Pulse 101   Temp 96 °F (35.6 °C) (Temporal)   Resp 18   Ht 5' 11\" (1.803 m)   Wt 150 lb (68 kg)   SpO2 96%   BMI 20.92 kg/m²       Intake/Output Summary (Last 24 hours) at 1/7/2022 3058  Last data filed at 1/6/2022 1542  Gross per 24 hour   Intake    Output 50 ml   Net -50 ml       Prior to Admission medications    Medication Sig Start Date End Date Taking?  Authorizing Provider   docusate sodium (COLACE) 100 MG capsule Take 100 mg by mouth as needed for Constipation   Yes Historical Provider, MD   famotidine (PEPCID) 40 MG tablet Take 40 mg by mouth as needed   Yes Historical Provider, MD   ascorbic acid (VITAMIN C) 500 MG tablet Take 500 mg by mouth 2 times daily   Yes Historical Provider, MD   aspirin 81 MG EC tablet Take 81 mg by mouth daily   Yes Historical Provider, MD   dilTIAZem (TIAZAC) 120 MG extended release capsule Take 120 mg by mouth daily   Yes Historical Provider, MD   vitamin E 400 UNIT capsule Take 400 Units by mouth daily   Yes Historical Provider, MD   cloNIDine (CATAPRES) 0.2 MG/24HR PTWK Place 1 patch onto the skin once a week   Yes Historical Provider, MD   Multiple Vitamins-Minerals (THERAPEUTIC MULTIVITAMIN-MINERALS) tablet Take 1 tablet by mouth daily   Yes Historical Provider, MD   carvedilol (COREG) 3.125 MG tablet Take 6.25 mg by mouth 2 times daily (with meals)    Yes Historical Provider, MD   allopurinol (ZYLOPRIM) 100 MG tablet Take 200 mg by mouth daily  7/29/21  Yes Historical Provider, MD   cloNIDine (CATAPRES) 0.1 MG tablet Take 1 tablet by mouth as needed for High Blood Pressure  7/19/21  Yes Historical Provider, MD   isosorbide mononitrate (IMDUR) 120 MG extended release tablet Take 1 tablet by mouth daily  7/19/21  Yes Historical Provider, MD   nitroGLYCERIN (NITROSTAT) 0.4 MG SL tablet Place 1 tablet under the tongue every 5 minutes as needed  7/19/21  Yes Historical Provider, MD   ranolazine (RANEXA) 500 MG extended release tablet Take 1 tablet by mouth 2 times daily  7/19/21  Yes Historical Provider, MD   rosuvastatin (CRESTOR) 40 MG tablet Take 20 mg by mouth daily  7/19/21  Yes Historical Provider, MD   DULoxetine HCl 60 MG CSDR Take 60 mg by mouth daily  7/19/21  Yes Historical Provider, MD   sodium bicarbonate 325 MG tablet Take 1 tablet by mouth 3 times daily  7/19/21  Yes Historical Provider, MD   clopidogrel (PLAVIX) 75 MG tablet Take 1 tablet by mouth daily  7/19/21  Yes Historical Provider, MD   ferrous sulfate (IRON 325) 325 (65 Fe) MG tablet Take 1 tablet by mouth 3 times daily (with meals)  7/29/21  Yes Historical Provider, MD   Calcium-Mag-Vit C-Vit D 318-39- PACK Take 1 tablet by mouth daily   Yes Historical Provider, MD   vitamin D 25 MCG (1000 UT) CAPS Take by mouth 7/29/21  Yes Historical Provider, MD   calcitRIOL (ROCALTROL) 0.5 MCG capsule Take 0.5 mcg by mouth daily   Patient not taking: Reported on 12/29/2021 7/21/21   Historical Provider, MD   Methoxy PEG-Epoetin Beta (MIRCERA IJ) 75 mcg  Patient not taking: Reported on 12/17/2021 insufficiency. DLP: 991 mGycm. The CT angiography of the abdomen and pelvis is performed after intravenous contrast enhancement. The images are acquired in axial plane and subsequent reconstruction in coronal and sagittal planes. There is no previous study for comparison. The correlation made with radiographs of the abdomen dated 1/4/2022. There are severe atheromatous changes of the abdominal aorta and iliac arteries. No aneurysmal dilatation or dissection. There is a large calcific plaque at the origin of the celiac trunk. No stenosis. Normal branches are noted. A calcific plaque is seen along the inferior right lateral margin of the superior mesenteric artery. No significant stenosis. Normal branches. Atheromatous plaques are seen in the proximal renal arteries bilaterally. No stenosis of the left renal artery. However there is high-grade, more than 70%, stenosis of the proximal right renal artery. Normal branches bilaterally. A large plaque is seen at the origin of the inferior mesenteric artery which is small and attenuated. Poor opacification of the branches which are not optimally visualized or evaluated. Atheromatous changes are seen involving the both common internal and external iliac arteries. There is a large mixed plaque in the proximal right external iliac artery. High-grade, more than 70% stenosis of the proximal right external iliac artery. The remaining external iliac and common femoral arteries appear unremarkable. Both divides into a superficial or deep femoral arteries with attenuation of the limited visualized proximal right superficial femoral artery. The lung bases included in the study show chronic emphysematous and interstitial lung changes. Atelectatic changes are seen at the left base. The liver and spleen are unremarkable. There is a fluid between the right diaphragm and the liver. There is geographic fatty infiltration of the liver. No radiopaque gallstones.  Moderate dilatation of common bile duct is noted measuring 9 mm. The pancreas appear normal. Moderately dilated pancreatic duct is seen measuring 3 to 4 mm in diameter. The adrenal glands are unremarkable. Moderate lobulation of renal contour bilaterally seen. There are several renal cortical nodules bilaterally. An exophytic nodule from the posterior aspect of the mid right kidney measures 1.6 cm. CT density suggest a cyst. Another smaller nodule in the lower pole of the right kidney posteriorly measures 1.3 cm. The CT density suggest a cyst. The remaining nodules are too small to be further characterized. No calculi. No hydronephrosis. The ureter are not optimally visualized or evaluated. The urinary bladder is decompressed. There is a Knight catheter in place. The prostate is moderately enlarged. There is moderate free fluid in the pelvis. There are small fat-containing umbilical hernia. There are fat-containing inguinal hernias bilaterally, right larger than the left. There is fluid in the right inguinal hernia which may represent a cyst or a undescended are partially descended right testis. There is marked distention of the stomach with air-fluid level. This is similar to the previous radiograph of the abdomen. There is mild dilatation of the entire small bowel except the distal/terminal ileum. The small bowel loops measure up to 4 cm in diameter. There is moderate thickening and enhancement of the distal ileum is moderate diffuse narrowing. The colon is decompressed. There is no evidence of abdominal or pelvic lymphadenopathy. The images reviewed in bone window show chronic degenerative changes of the lumbar and limited visualized thoracic spine. No focal bony lesion. There is evidence of lower midline abdominal surgery with subsequent subcutaneous fat infiltration under the surgical staples. 1. Severe atheromatous changes of the abdominal aorta iliac and femoral arteries. 2. High-grade stenosis of the proximal right renal artery.  3. No evidence of celiac and superior mesenteric artery origin stenosis in the visualized part of these arteries. 4. Significant atheromatous changes of stenosis of the iliac arteries as detailed above. 5. Significant persistent dilatation of small bowel loops may represent distal small bowel obstruction. This may partly represent ileus (post procedure/postsurgical?). 6. Moderate thickening and enhancement of the distal ileum with surrounding peritoneal infiltration and fluid. This may represent an inflammatory or neoplastic process? Gweneth Latch The large bowel is decompressed. 7. Incompletely evaluated low-density nodules in both kidneys. 8. Bilateral fat-containing inguinal hernias. Soft tissue density in the right inguinal hernia may represent a loculated fluid, a cyst or a partially descended right testes? . 9. Enlarged prostate. Signed by Dr Bang Samples (2 VIEWS)    Result Date: 1/5/2022  Examination. XR ABDOMEN (2 VIEWS) 1/4/2022 11:18 PM History: Small bowel obstruction. Supine and upright views of the abdomen are obtained. There is no previous study for comparison. There is moderate dilatation of small bowel loops with air-fluid levels. The bowel loops measure up to 5.5 cm in diameter. The distal small bowel and in the right iliac fossa appears of normal size. The large bowel is decompressed. There is moderate amount of air in the stomach with air-fluid level. No evidence of free air. The kidneys are obscured by the bowel gas. No definite radiopaque calculi are seen. The limited visualized lungs appear unremarkable. Atheromatous changes of coronary arteries and the right coronary stenting is noted. No focal bony abnormality. 1. Distal small bowel obstruction. Signed by Dr Angelo Richter:  1. End-stage renal disease on maintenance hemodialysis  2. Hypertension  3. Coronary artery disease  4. Diabetes  5. Previous umbilical hernia repair  6.  Repair of 2 ventral hernias 12/29/2021  7. Exploratory laparotomy status post lysis of adhesions right lower quadrant mesh explantation 1/5/2022 no postoperative day #1  8. Tachycardia? Atrial flutter now on IV diltiazem  9. History of 8 stents previously placed [facility]  10. Remote myocardial infarction  11. Occasional chest pain at home noted. Plan:  1. Continue treatment IV diltiazem over the weekend until he is able to take p.o. medications remains in sinus rhythm at this time.     Negar Chandler MD, MD 1/7/2022 9:29 AM

## 2022-01-07 NOTE — PLAN OF CARE
Problem: Falls - Risk of:  Goal: Will remain free from falls  Description: Will remain free from falls  1/7/2022 1510 by Lashell Glass RN  Outcome: Ongoing  1/7/2022 0850 by Vimal Carroll MS, RD, LD  Outcome: Ongoing  Goal: Absence of physical injury  Description: Absence of physical injury  1/7/2022 1510 by Lashell Glass RN  Outcome: Ongoing  1/7/2022 0850 by Vimal Carroll MS, RD, LD  Outcome: Ongoing     Problem: Skin Integrity:  Goal: Will show no infection signs and symptoms  Description: Will show no infection signs and symptoms  1/7/2022 1510 by Lashell Glass RN  Outcome: Ongoing  1/7/2022 0850 by Vimal Carroll MS, RD, LD  Outcome: Ongoing  Goal: Absence of new skin breakdown  Description: Absence of new skin breakdown  1/7/2022 1510 by Lashell Glass RN  Outcome: Ongoing  1/7/2022 0850 by Vimal Carroll MS, RD, LD  Outcome: Ongoing  Goal: Risk for impaired skin integrity will decrease  Description: Risk for impaired skin integrity will decrease  1/7/2022 1510 by Lashell Glass RN  Outcome: Ongoing  1/7/2022 0850 by Vimal Carroll MS, RD, LD  Outcome: Ongoing     Problem: Pain:  Goal: Pain level will decrease  Description: Pain level will decrease  1/7/2022 1510 by Lashell Glass RN  Outcome: Ongoing  1/7/2022 0850 by Vimal Carroll MS, RD, LD  Outcome: Ongoing  Goal: Control of acute pain  Description: Control of acute pain  1/7/2022 1510 by Lashell Glass RN  Outcome: Ongoing  1/7/2022 0850 by Vimal Carroll MS, RD, LD  Outcome: Ongoing  Goal: Control of chronic pain  Description: Control of chronic pain  1/7/2022 1510 by Lashell Glass RN  Outcome: Ongoing  1/7/2022 0850 by Vimal Carroll MS, RD, LD  Outcome: Ongoing     Problem:  Bowel/Gastric:  Goal: Control of bowel function will improve  Description: Control of bowel function will improve  1/7/2022 1510 by Lashell Glass RN  Outcome: Ongoing  1/7/2022 0850 by Vimal Carroll MS, RD, LD  Outcome: Ongoing  Goal: Ability to achieve a regular elimination pattern will improve  Description: Ability to achieve a regular elimination pattern will improve  1/7/2022 1510 by Nely Álvarez RN  Outcome: Ongoing  1/7/2022 0850 by Lori Jones MS, RD, LD  Outcome: Ongoing     Problem: Nutritional:  Goal: Ability to follow a diet with enough fiber (20 to 30 grams) for normal bowel function will improve  Description: Ability to follow a diet with enough fiber (20 to 30 grams) for normal bowel function will improve  1/7/2022 1510 by Nely Álvarez RN  Outcome: Ongoing  1/7/2022 0850 by Lori Jones MS, RD, LD  Outcome: Ongoing     Problem: Nutrition  Goal: Optimal nutrition therapy  1/7/2022 1510 by Nely Álvarez RN  Outcome: Ongoing  1/7/2022 0850 by Lori Jones MS, RD, LD  Outcome: Ongoing

## 2022-01-08 ENCOUNTER — APPOINTMENT (OUTPATIENT)
Dept: GENERAL RADIOLOGY | Age: 77
DRG: 356 | End: 2022-01-08
Attending: SURGERY
Payer: OTHER GOVERNMENT

## 2022-01-08 PROCEDURE — 6370000000 HC RX 637 (ALT 250 FOR IP): Performed by: SURGERY

## 2022-01-08 PROCEDURE — 6360000002 HC RX W HCPCS: Performed by: SURGERY

## 2022-01-08 PROCEDURE — 71045 X-RAY EXAM CHEST 1 VIEW: CPT

## 2022-01-08 PROCEDURE — 99024 POSTOP FOLLOW-UP VISIT: CPT | Performed by: SURGERY

## 2022-01-08 PROCEDURE — 2580000003 HC RX 258: Performed by: SURGERY

## 2022-01-08 PROCEDURE — 2140000000 HC CCU INTERMEDIATE R&B

## 2022-01-08 PROCEDURE — 6370000000 HC RX 637 (ALT 250 FOR IP): Performed by: STUDENT IN AN ORGANIZED HEALTH CARE EDUCATION/TRAINING PROGRAM

## 2022-01-08 RX ORDER — ZOLPIDEM TARTRATE 5 MG/1
5 TABLET ORAL NIGHTLY PRN
Status: DISCONTINUED | OUTPATIENT
Start: 2022-01-08 | End: 2022-01-10 | Stop reason: HOSPADM

## 2022-01-08 RX ORDER — DILTIAZEM HYDROCHLORIDE 120 MG/1
120 CAPSULE, COATED, EXTENDED RELEASE ORAL 2 TIMES DAILY
Status: DISCONTINUED | OUTPATIENT
Start: 2022-01-08 | End: 2022-01-08

## 2022-01-08 RX ORDER — CARVEDILOL 12.5 MG/1
12.5 TABLET ORAL 2 TIMES DAILY WITH MEALS
Status: DISCONTINUED | OUTPATIENT
Start: 2022-01-08 | End: 2022-01-10 | Stop reason: HOSPADM

## 2022-01-08 RX ORDER — DILTIAZEM HYDROCHLORIDE 120 MG/1
120 CAPSULE, COATED, EXTENDED RELEASE ORAL DAILY
Status: DISCONTINUED | OUTPATIENT
Start: 2022-01-08 | End: 2022-01-10 | Stop reason: HOSPADM

## 2022-01-08 RX ADMIN — FERROUS SULFATE TAB 325 MG (65 MG ELEMENTAL FE) 325 MG: 325 (65 FE) TAB at 13:27

## 2022-01-08 RX ADMIN — SODIUM CHLORIDE, PRESERVATIVE FREE 10 ML: 5 INJECTION INTRAVENOUS at 21:10

## 2022-01-08 RX ADMIN — RANOLAZINE 500 MG: 500 TABLET, FILM COATED, EXTENDED RELEASE ORAL at 21:10

## 2022-01-08 RX ADMIN — ZOLPIDEM TARTRATE 5 MG: 5 TABLET ORAL at 19:55

## 2022-01-08 RX ADMIN — DILTIAZEM HYDROCHLORIDE 120 MG: 120 CAPSULE, COATED, EXTENDED RELEASE ORAL at 19:56

## 2022-01-08 RX ADMIN — HEPARIN SODIUM 5000 UNITS: 5000 INJECTION INTRAVENOUS; SUBCUTANEOUS at 21:10

## 2022-01-08 RX ADMIN — OXYCODONE HYDROCHLORIDE AND ACETAMINOPHEN 500 MG: 500 TABLET ORAL at 21:10

## 2022-01-08 RX ADMIN — HEPARIN SODIUM 5000 UNITS: 5000 INJECTION INTRAVENOUS; SUBCUTANEOUS at 13:27

## 2022-01-08 RX ADMIN — HYDROMORPHONE HYDROCHLORIDE 0.5 MG: 1 INJECTION, SOLUTION INTRAMUSCULAR; INTRAVENOUS; SUBCUTANEOUS at 13:27

## 2022-01-08 RX ADMIN — FERROUS SULFATE TAB 325 MG (65 MG ELEMENTAL FE) 325 MG: 325 (65 FE) TAB at 19:56

## 2022-01-08 RX ADMIN — HYDROMORPHONE HYDROCHLORIDE 0.5 MG: 1 INJECTION, SOLUTION INTRAMUSCULAR; INTRAVENOUS; SUBCUTANEOUS at 09:55

## 2022-01-08 RX ADMIN — SIMETHICONE 80 MG: 80 TABLET, CHEWABLE ORAL at 21:10

## 2022-01-08 RX ADMIN — CARVEDILOL 12.5 MG: 12.5 TABLET, FILM COATED ORAL at 19:56

## 2022-01-08 ASSESSMENT — PAIN SCALES - GENERAL
PAINLEVEL_OUTOF10: 10
PAINLEVEL_OUTOF10: 0

## 2022-01-08 NOTE — PROGRESS NOTES
Daily Progress Note    Date:2022  Patient: Samir Tamayo Sr.  : 1945  ELF:[id number]  CODE:Full Code No additional code details  PCP:Hermes Quach    Admit Date: 2022  8:55 PM   LOS: 4 days       Subjective:   He continues to feel ill with generalized malaise, seems more alert today. Reports passing some flatus but no bowel movement. Denies any worsening shortness of breath or palpitations. During my evaluation, currently remaining in sinus rhythm with intermittent tachycardia. Review of Systems    Comprehensive ROS completed and is negative except as otherwise noted      Objective:      Vital signs in last 24 hours:  Patient Vitals for the past 24 hrs:   BP Temp Temp src Pulse Resp SpO2   22 1102 (!) 162/76 96.8 °F (36 °C) Temporal 102 18 94 %   22 0830    98     22 0648 (!) 145/78  Temporal 98 18    22 0113 (!) 145/76 98 °F (36.7 °C) Temporal 114 18    22 2221    104     22 1729 138/74 97.4 °F (36.3 °C) Temporal 98 20 96 %     Physical exam    General: Ill-appearing, no distress  HEENT: NC/AT, no scleral icterus  Neck: Trachea midline  Respiratory: Normal respiratory effort, no wheezes, diminished breath sounds at bases  Cardiovascular: normal rhythm, pulses 2+ and equal  Abdomen: Tenderness noted without guarding or rebound  Musculoskeletal: No deformities  Skin: Warm and dry      Lab Review   No results found for this or any previous visit (from the past 24 hour(s)). I/O last 3 completed shifts: In: 10 [I.V.:10]  Out: 910 [Urine:60; Emesis/NG output:850]  No intake/output data recorded.       Current Facility-Administered Medications:     carvedilol (COREG) tablet 12.5 mg, 12.5 mg, Oral, BID , David Mata MD    dilTIAZem (CARDIZEM CD) extended release capsule 120 mg, 120 mg, Oral, BID, David Mata MD    zolpidem (AMBIEN) tablet 5 mg, 5 mg, Oral, Nightly PRN, Heidi Villalba MD    famotidine (PEPCID) injection 20 mg, 20 mg, IntraVENous, Once, Jorge L Noel MD    sodium chloride flush 0.9 % injection 5-40 mL, 5-40 mL, IntraVENous, 2 times per day, Abiel Rodriguez DO, 10 mL at 01/07/22 2220    sodium chloride flush 0.9 % injection 5-40 mL, 5-40 mL, IntraVENous, PRN, Abiel Rodriguez DO    0.9 % sodium chloride infusion, 25 mL, IntraVENous, PRN, Abiel Rodriguez DO    ondansetron (ZOFRAN-ODT) disintegrating tablet 4 mg, 4 mg, Oral, Q8H PRN **OR** ondansetron (ZOFRAN) injection 4 mg, 4 mg, IntraVENous, Q6H PRN, Segundo Smipson DO    heparin (porcine) injection 5,000 Units, 5,000 Units, SubCUTAneous, 3 times per day, Segundo Simpson DO, 5,000 Units at 01/08/22 1327    oxyCODONE (ROXICODONE) immediate release tablet 5 mg, 5 mg, Oral, Q4H PRN, Segundo Simpson DO    HYDROmorphone HCl PF (DILAUDID) injection 0.5 mg, 0.5 mg, IntraVENous, Q3H PRN, Segundo Simpson DO, 0.5 mg at 01/08/22 1327    allopurinol (ZYLOPRIM) tablet 200 mg, 200 mg, Oral, Daily, Abiel Rodriguez DO    aspirin chewable tablet 81 mg, 81 mg, Oral, Daily, Abiel Rodriguez DO    calcitRIOL (ROCALTROL) capsule 0.5 mcg, 0.5 mcg, Oral, Daily, Abiel Rodriguez DO    cloNIDine (CATAPRES) tablet 0.1 mg, 0.1 mg, Oral, PRN, Abiel Rodriguez DO    DULoxetine (CYMBALTA) extended release capsule 60 mg, 60 mg, Oral, Daily, Abiel Rodriguez DO    ferrous sulfate (IRON 325) tablet 325 mg, 325 mg, Oral, TID WC, Segundo Simpson DO, 325 mg at 01/08/22 1327    isosorbide mononitrate (IMDUR) extended release tablet 120 mg, 120 mg, Oral, Daily, Abiel Rodriguez DO, 120 mg at 01/05/22 1456    ranolazine (RANEXA) extended release tablet 500 mg, 500 mg, Oral, BID, Abiel Rodriguez DO    ascorbic acid (VITAMIN C) tablet 500 mg, 500 mg, Oral, BID, Abiel Rodriguez DO    [COMPLETED] dilTIAZem injection 10 mg, 10 mg, IntraVENous, Once, 10 mg at 01/05/22 1194 **FOLLOWED BY** dilTIAZem 125 mg in dextrose 5 % 125 mL infusion, 5-15 mg/hr, IntraVENous, Continuous, Luis Pratt MD, Last Rate: 10 mL/hr at 01/07/22 1111, 10 mg/hr at 01/07/22 1111    lactated ringers infusion, , IntraVENous, Continuous, Wero Carlo, DO, Last Rate: 100 mL/hr at 01/05/22 0811, NoRateChange at 01/05/22 0811    ondansetron (ZOFRAN) injection 4 mg, 4 mg, IntraVENous, Q6H PRN, Wero Carlo, DO, 4 mg at 01/04/22 2257    acetaminophen (TYLENOL) tablet 650 mg, 650 mg, Oral, Q4H PRN, Wero Carlo, DO    HYDROmorphone HCl PF (DILAUDID) injection 0.5 mg, 0.5 mg, IntraVENous, Q4H PRN, Wero Carlo, DO, 0.5 mg at 01/08/22 0955    simethicone (MYLICON) chewable tablet 80 mg, 80 mg, Oral, BID, Abiel Rodriguez, DO, 80 mg at 01/04/22 2315      Assessment/plan  Active Problems:    Atrial flutter with rapid ventricular response (HCC)    Small bowel obstruction (HCC)    ESRD (end stage renal disease) ([facility] [address]  Resolved Problems:    * No resolved hospital problems.  *      Postoperative SBO/ileus, s/p ex lap with lysis of adhesions  Management with NG tube  Care per general surgery    Episodes of atrial flutter with rapid response  Currently in sinus rhythm, intermittently tachycardic  Continue Cardizem drip   Continue Coreg and Cardizem via NG tube or orally if able to tolerate, uptitrate meds and wean off Cardizem drip as able    ESRD  Nephrology managing dialytic needs    DVT prophylaxis: Heparin      Dov Britton MD 1/8/2022 3:36 PM

## 2022-01-08 NOTE — PROGRESS NOTES
Entered room to see NG tube coiled in patient mouth. Patient states that he was coughing and the tube became twisted in his mouth.      Electronically signed by Ольга Santo RN on 1/8/2022 at 12:29 PM

## 2022-01-08 NOTE — PLAN OF CARE
Problem: Falls - Risk of:  Goal: Will remain free from falls  Outcome: Ongoing  Goal: Absence of physical injury  Outcome: Ongoing     Problem: Skin Integrity:  Goal: Will show no infection signs and symptoms  Outcome: Ongoing  Goal: Absence of new skin breakdown  Outcome: Ongoing  Goal: Risk for impaired skin integrity will decrease  Outcome: Ongoing     Problem: Pain:  Description: Pain management should include both nonpharmacologic and pharmacologic interventions. Goal: Pain level will decrease  Outcome: Ongoing  Goal: Control of acute pain  Outcome: Ongoing  Goal: Control of chronic pain  Outcome: Ongoing     Problem:  Bowel/Gastric:  Goal: Control of bowel function will improve  Outcome: Ongoing  Goal: Ability to achieve a regular elimination pattern will improve  Outcome: Ongoing     Problem: Nutritional:  Goal: Ability to follow a diet with enough fiber (20 to 30 grams) for normal bowel function will improve  Outcome: Ongoing     Problem: Nutrition  Goal: Optimal nutrition therapy  Outcome: Ongoing

## 2022-01-08 NOTE — PROGRESS NOTES
Notified Dr. Neto Yang of  predicament. Orders to align the tube and attempt replacement.      Electronically signed by Torrey Hogue RN on 1/8/2022 at 12:41 PM

## 2022-01-08 NOTE — PROGRESS NOTES
Nephrology ([facility]) Progress Note    Patient:  Srinivas Bell Sr.  YOB: 1945  Date of Service: 1/8/2022  MRN: [id number]   Acct: [de-identified]   Primary Care Physician: Dany Canales  Advance Directive: Full Code  Admit Date: 1/4/2022       Hospital Day: 4  Referring Provider: Katy De Los Santos MD    Patient Seen, Chart, Consults notes, Labs, Radiology studies reviewed. Subjective:  Patient is a 80-year-old man with a past medical history of hypertension coronary disease and diabetes. Patient has been on chronic maintenance hemodialysis via a right IJ PermCath for the last 5 months. He is wanting to switch to home peritoneal dialysis. He has an abdominal wall hernia and needed repair before receiving a peritoneal dialysis catheter. He has a previous umbilical hernia repaired. Patient then underwent repair of 2 ventral hernias on 12/29. He then felt distended and he was not feeling well. He started having some loose bowel movements and was complaining of nausea. He presented to a local hospital and was then transferred to [facility] in [place]. Patient underwent on 1/5 exploratory laparotomy with lysis of small adhesion of the right lower quadrant as well as mesh explantation. Renal service was consulted to manage his ESRD. Patient has missed weeks of dialysis when he was not feeling well. On 1/5, his labs showed a serum potassium of 5.6 and a serum bicarbonate of 13 with BUN at 109 and creatinine at 9.3. Patient is s/p dialysis on 1/5. He continues to have some abdominal pain and has refused to come to dialysis on 1/6. He is believed to have ileus. Patient is s/p dialysis on 1/7. Today, he continues to have some abdominal distention and malaise. He refused to do more dialysis. Allergies:  Patient has no known allergies.     Medicines:  Current Facility-Administered Medications   Medication Dose Route Frequency Provider Last Rate Last Admin    carvedilol (COREG) tablet 12.5 mg  12.5 mg Oral BID  Dov Britton MD        dilTIAZem (CARDIZEM CD) extended release capsule 120 mg  120 mg Oral BID Dov Britton MD        zolpidem [facility]) tablet 5 mg  5 mg Oral Nightly PRN Tiffany Bustos MD        famotidine (PEPCID) injection 20 mg  20 mg IntraVENous Once Elmore MD Gloria        sodium chloride flush 0.9 % injection 5-40 mL  5-40 mL IntraVENous 2 times per day Wero Carlo, DO   10 mL at 01/07/22 2220    sodium chloride flush 0.9 % injection 5-40 mL  5-40 mL IntraVENous PRN Wero Carlo, DO        0.9 % sodium chloride infusion  25 mL IntraVENous PRN Wero Carlo, DO        ondansetron (ZOFRAN-ODT) disintegrating tablet 4 mg  4 mg Oral Q8H PRN Wero Carlo, DO        Or    ondansetron [facility]) injection 4 mg  4 mg IntraVENous Q6H PRN Wero Carlo, DO        heparin (porcine) injection 5,000 Units  5,000 Units SubCUTAneous 3 times per day Wero Carlo, DO   5,000 Units at 01/07/22 0549    oxyCODONE (ROXICODONE) immediate release tablet 5 mg  5 mg Oral Q4H PRN Wero Carlo, DO        HYDROmorphone HCl PF (DILAUDID) injection 0.5 mg  0.5 mg IntraVENous Q3H PRN Wero Carlo, DO   0.5 mg at 01/06/22 1542    allopurinol (ZYLOPRIM) tablet 200 mg  200 mg Oral Daily Abiel Rodriguez, DO        aspirin chewable tablet 81 mg  81 mg Oral Daily Abiel Rodriguez, DO        calcitRIOL (ROCALTROL) capsule 0.5 mcg  0.5 mcg Oral Daily Abiel Rodriguez, DO        cloNIDine (CATAPRES) tablet 0.1 mg  0.1 mg Oral PRN Wero Carlo, DO        DULoxetine (CYMBALTA) extended release capsule 60 mg  60 mg Oral Daily Abiel Rodriguez, DO        ferrous sulfate (IRON 325) tablet 325 mg  325 mg Oral TID  Wero Carlo, DO   325 mg at 01/05/22 1659    isosorbide mononitrate (IMDUR) extended release tablet 120 mg  120 mg Oral Daily Wero Matos DO   120 mg at 01/05/22 1456    ranolazine (RANEXA) extended release tablet 500 mg 500 mg Oral BID Baldo Pages, DO        ascorbic acid (VITAMIN C) tablet 500 mg  500 mg Oral BID Abiel Rodriguez, DO        dilTIAZem 125 mg in dextrose 5 % 125 mL infusion  5-15 mg/hr IntraVENous Continuous Rosa Fishman MD 10 mL/hr at 01/07/22 1111 10 mg/hr at 01/07/22 1111    lactated ringers infusion   IntraVENous Continuous Baldo Pages,  mL/hr at 01/05/22 0811 NoRateChange at 01/05/22 0811    ondansetron (ZOFRAN) injection 4 mg  4 mg IntraVENous Q6H PRN Baldo Pages, DO   4 mg at 01/04/22 2257    acetaminophen (TYLENOL) tablet 650 mg  650 mg Oral Q4H PRN Baldo Pages, DO        HYDROmorphone HCl PF (DILAUDID) injection 0.5 mg  0.5 mg IntraVENous Q4H PRN Baldo Pages, DO   0.5 mg at 01/08/22 0955    simethicone (MYLICON) chewable tablet 80 mg  80 mg Oral BID [place], DO   80 mg at 01/04/22 2315       Past Medical History:  Past Medical History:   Diagnosis Date    CAD (coronary artery disease)     Chronic kidney disease     [facility]     Heart disease     Hemodialysis patient ([facility]     Hyperlipidemia     Hypertension        Past Surgical History:  Past Surgical History:   Procedure Laterality Date    CORONARY ANGIOPLASTY WITH STENT PLACEMENT      LAPAROTOMY N/A 1/5/2022    LAPAROTOMY EXPLORATORY, LYSIS OF ADHESION performed by Junior Tyrone DO at [address] [place] [address] N/A 12/29/2021    LAPAROSCOPIC PRIMARY EPIGASTRIC HERNIA REPAIR WITH MESH performed by Junior Tyrone DO at [facility] History  No family history on file.     Social History  Social History     Socioeconomic History    Marital status: Single     Spouse name: Not on file    Number of children: Not on file    Years of education: Not on file    Highest education level: Not on file   Occupational History    Not on file   Tobacco Use    Smoking status: Former Smoker    Smokeless tobacco: Never Used   Vaping Use    Vaping Use: Never used   Substance and Sexual Activity    Alcohol use: Never    Drug use: Never    Sexual activity: Not on file   Other Topics Concern    Not on file   Social History Narrative    Not on file     Social Determinants of Health     Financial Resource Strain:     Difficulty of Paying Living Expenses: Not on file   Food Insecurity:     Worried About Running Out of Food in the Last Year: Not on file    [place] of Food in the Last Year: Not on file   Transportation Needs:     Lack of Transportation (Medical): Not on file    Lack of Transportation (Non-Medical):  Not on file   Physical Activity:     Days of Exercise per Week: Not on file    Minutes of Exercise per Session: Not on file   Stress:     Feeling of Stress : Not on file   Social Connections:     Frequency of Communication with Friends and Family: Not on file    Frequency of Social Gatherings with Friends and Family: Not on file    Attends Mandaen Services: Not on file    Active Member of [address] [place] [address] or Organizations: Not on file    Attends Club or Organization Meetings: Not on file    Marital Status: Not on file   Intimate Partner Violence:     Fear of Current or Ex-Partner: Not on file    Emotionally Abused: Not on file    Physically Abused: Not on file    Sexually Abused: Not on file   Housing Stability:     Unable to Pay for Housing in the Last Year: Not on file    Number of [place] in the Last Year: Not on file    Unstable Housing in the Last Year: Not on file         Review of Systems:  History obtained from chart review and the patient  General ROS: No fever or chills  Respiratory ROS: No cough, +shortness of breath, -wheezing  Cardiovascular ROS: no chest pain but dyspnea on exertion  Gastrointestinal ROS: + abdominal pain, no melena  Genito-Urinary ROS: No dysuria or hematuria  Musculoskeletal ROS: No joint pain or swelling       Objective:  Blood pressure (!) 162/76, pulse 102, temperature 96.8 °F (36 °C), temperature source Temporal, resp. rate 18, height 5' 11\" (1.803 m), weight 150 lb (68 kg), SpO2 94 %. Intake/Output Summary (Last 24 hours) at 1/8/2022 1310  Last data filed at 1/7/2022 2221  Gross per 24 hour   Intake 10 ml   Output 60 ml   Net -50 ml     General: alert and oriented x3   Chest: Bilateral air entry with scattered rales and diminished breath sounds in the bases  CVS: regular rate and rhythm  Abdominal:distended, no guaridng  Extremities: no cyanosis trace leg edema  Skin: warm and dry without rash    Labs:  BMP:   Recent Labs     01/06/22  0211 01/07/22  0303    140   K 4.7 5.0    98   CO2 17* 18*   PHOS 9.2*  --    BUN 65* 98*   CREATININE 6.6* 9.0*   CALCIUM 9.2 9.1     CBC:   Recent Labs     01/06/22  0211 01/07/22  0303   WBC 5.3 7.2   HGB 10.5* 10.2*   HCT 32.4* 30.4*   MCV 96.1* 93.3    356     LIVER PROFILE:   No results for input(s): AST, ALT, LIPASE, BILIDIR, BILITOT, ALKPHOS in the last 72 hours. Invalid input(s): AMYLASE,  ALB  PT/INR: No results for input(s): PROTIME, INR in the last 72 hours. APTT: No results for input(s): APTT in the last 72 hours. BNP:  No results for input(s): BNP in the last 72 hours. Ionized Calcium:No results for input(s): IONCA in the last 72 hours. Magnesium:  Recent Labs     01/06/22  1254   MG 1.9     Phosphorus:  Recent Labs     01/06/22  0211   PHOS 9.2*     HgbA1C: No results for input(s): LABA1C in the last 72 hours. Hepatic:   No results for input(s): ALKPHOS, ALT, AST, PROT, BILITOT, BILIDIR, LABALBU in the last 72 hours. Lactic Acid: No results for input(s): LACTA in the last 72 hours. Troponin: No results for input(s): CKTOTAL, CKMB, TROPONINT in the last 72 hours. ABGs: No results for input(s): PH, PCO2, PO2, HCO3, O2SAT in the last 72 hours. CRP:  No results for input(s): CRP in the last 72 hours. Sed Rate:  No results for input(s): SEDRATE in the last 72 hours.     Cultures:   No results for input(s): CULTURE in the last 72 hours. Radiology reports as per the Radiologist  Radiology: XR ABDOMEN (2 VIEWS)    Result Date: 1/5/2022  Examination. XR ABDOMEN (2 VIEWS) 1/4/2022 11:18 PM History: Small bowel obstruction. Supine and upright views of the abdomen are obtained. There is no previous study for comparison. There is moderate dilatation of small bowel loops with air-fluid levels. The bowel loops measure up to 5.5 cm in diameter. The distal small bowel and in the right iliac fossa appears of normal size. The large bowel is decompressed. There is moderate amount of air in the stomach with air-fluid level. No evidence of free air. The kidneys are obscured by the bowel gas. No definite radiopaque calculi are seen. The limited visualized lungs appear unremarkable. Atheromatous changes of coronary arteries and the right coronary stenting is noted. No focal bony abnormality. 1. Distal small bowel obstruction. Signed by Dr Chito Ramos   1. ESRD  2. Type 2 DM with renal disease  3. Repair of ventral hernia  4. Abdominal pain  5. Nausea  6. Hyperkalemia  7. Metabolic acidosis  8. Post-operative ileus      Plan: We will offer dialysis on 1/10. [employer]. Follow up labs. Discussed with patient and family.

## 2022-01-08 NOTE — PROGRESS NOTES
[facility] General Surgery    Progress Note    POD # 3    Subjective:    Resting in bed. Reports that he coughed just a short while ago and his NG tube is now coiled in his mouth. Passing a bit of flatus but no bowel movement. Denies significant pain or other problems. Objective:    Vitals:    01/07/22 2221 01/08/22 0113 01/08/22 0648 01/08/22 1102   BP:  (!) 145/76 (!) 145/78 (!) 162/76   Pulse: 104 114 98 102   Resp:  18 18 18   Temp:  98 °F (36.7 °C)  96.8 °F (36 °C)   TempSrc:  Temporal Temporal Temporal   SpO2:    94%   Weight:       Height:         I/O last 3 completed shifts: In: 10 [I.V.:10]  Out: 910 [Urine:60; Emesis/NG output:850]     NG tube indeed is coiled in his mouth. Abdomen is still slightly distended. Minimal tenderness. Incision intact. Bowel sounds minimal.    LABS:   CBC:   Recent Labs     01/06/22  0211 01/07/22  0303   WBC 5.3 7.2   RBC 3.37* 3.26*   HGB 10.5* 10.2*   HCT 32.4* 30.4*    356   BANDSPCT 33*  --    LYMPHOPCT 10.0*  --    MONOPCT 3.0  --    BASOPCT 0.0  --    MONOSABS 0.20  --    LYMPHSABS 0.5*  --    EOSABS 0.00  --    BASOSABS 0.00  --       BMP:   Recent Labs     01/06/22  0211 01/07/22  0303    140   K 4.7 5.0    98   CO2 17* 18*   ANIONGAP 24* 24*   GLUCOSE 134* 118*   CREATININE 6.6* 9.0*   LABGLOM 8* 6*   CALCIUM 9.2 9.1     LFT: No results for input(s): PROT, LABALBU, BILITOT, ALKPHOS, ALT, AST in the last 72 hours. Assessment:    1. Continued satisfactory recovery following exploratory laparotomy with lysis of adhesions. Postop ileus persists but may be getting to resolve. Plan:    1. Discussed reinsertion of NG tube with the nursing staff. 2. Otherwise continue current care.   3. Added Ambien for sleep at patient's request.

## 2022-01-09 ENCOUNTER — APPOINTMENT (OUTPATIENT)
Dept: GENERAL RADIOLOGY | Age: 77
DRG: 356 | End: 2022-01-09
Attending: SURGERY
Payer: OTHER GOVERNMENT

## 2022-01-09 VITALS
BODY MASS INDEX: 20.02 KG/M2 | SYSTOLIC BLOOD PRESSURE: 39 MMHG | DIASTOLIC BLOOD PRESSURE: 12 MMHG | OXYGEN SATURATION: 92 % | HEIGHT: 71 IN | WEIGHT: 143 LBS | TEMPERATURE: 88 F

## 2022-01-09 LAB
ALBUMIN SERPL-MCNC: 3.2 G/DL (ref 3.5–5.2)
ALP BLD-CCNC: 113 U/L (ref 40–130)
ALT SERPL-CCNC: 497 U/L (ref 5–41)
ANION GAP SERPL CALCULATED.3IONS-SCNC: 28 MMOL/L (ref 7–19)
ANION GAP SERPL CALCULATED.3IONS-SCNC: 33 MMOL/L (ref 7–19)
AST SERPL-CCNC: 880 U/L (ref 5–40)
BASE EXCESS ARTERIAL: -11.7 MMOL/L (ref -2–2)
BASE EXCESS ARTERIAL: -16.4 MMOL/L (ref -2–2)
BILIRUB SERPL-MCNC: 0.6 MG/DL (ref 0.2–1.2)
BUN BLDV-MCNC: 104 MG/DL (ref 8–23)
BUN BLDV-MCNC: 93 MG/DL (ref 8–23)
CALCIUM SERPL-MCNC: 10.1 MG/DL (ref 8.8–10.2)
CALCIUM SERPL-MCNC: 9.8 MG/DL (ref 8.8–10.2)
CARBOXYHEMOGLOBIN ARTERIAL: 1.4 % (ref 0–5)
CARBOXYHEMOGLOBIN ARTERIAL: 2.4 % (ref 0–5)
CHLORIDE BLD-SCNC: 91 MMOL/L (ref 98–111)
CHLORIDE BLD-SCNC: 94 MMOL/L (ref 98–111)
CO2: 14 MMOL/L (ref 22–29)
CO2: 19 MMOL/L (ref 22–29)
CREAT SERPL-MCNC: 10 MG/DL (ref 0.5–1.2)
CREAT SERPL-MCNC: 9 MG/DL (ref 0.5–1.2)
GFR AFRICAN AMERICAN: 6
GFR AFRICAN AMERICAN: 7
GFR NON-AFRICAN AMERICAN: 5
GFR NON-AFRICAN AMERICAN: 6
GLUCOSE BLD-MCNC: 101 MG/DL (ref 74–109)
GLUCOSE BLD-MCNC: 102 MG/DL (ref 70–99)
GLUCOSE BLD-MCNC: 15 MG/DL (ref 74–109)
GLUCOSE BLD-MCNC: 244 MG/DL (ref 70–99)
GLUCOSE BLD-MCNC: 289 MG/DL (ref 70–99)
GLUCOSE BLD-MCNC: 293 MG/DL (ref 70–99)
GLUCOSE BLD-MCNC: 76 MG/DL (ref 70–99)
HCO3 ARTERIAL: 12.1 MMOL/L (ref 22–26)
HCO3 ARTERIAL: 16.4 MMOL/L (ref 22–26)
HEMOGLOBIN, ART, EXTENDED: 10.2 G/DL (ref 14–18)
HEMOGLOBIN, ART, EXTENDED: 8.3 G/DL (ref 14–18)
MAGNESIUM: 3.4 MG/DL (ref 1.6–2.4)
METHEMOGLOBIN ARTERIAL: 0.5 %
METHEMOGLOBIN ARTERIAL: 0.7 %
O2 CONTENT ARTERIAL: 11.6 ML/DL
O2 CONTENT ARTERIAL: 14.3 ML/DL
O2 SAT, ARTERIAL: 97.1 %
O2 SAT, ARTERIAL: 97.5 %
O2 THERAPY: ABNORMAL
O2 THERAPY: ABNORMAL
PCO2 ARTERIAL: 38 MMHG (ref 35–45)
PCO2 ARTERIAL: 47 MMHG (ref 35–45)
PERFORMED ON: ABNORMAL
PERFORMED ON: NORMAL
PH ARTERIAL: 7.11 (ref 7.35–7.45)
PH ARTERIAL: 7.15 (ref 7.35–7.45)
PHOSPHORUS: 17.3 MG/DL (ref 2.5–4.5)
PO2 ARTERIAL: 125 MMHG (ref 80–100)
PO2 ARTERIAL: 159 MMHG (ref 80–100)
POTASSIUM REFLEX MAGNESIUM: 5.9 MMOL/L (ref 3.5–5)
POTASSIUM REFLEX MAGNESIUM: 8.6 MMOL/L (ref 3.5–5)
POTASSIUM SERPL-SCNC: 5 MMOL/L (ref 3.5–5)
POTASSIUM, WHOLE BLOOD: 5.2
POTASSIUM, WHOLE BLOOD: 9.1
SODIUM BLD-SCNC: 138 MMOL/L (ref 136–145)
SODIUM BLD-SCNC: 141 MMOL/L (ref 136–145)
TOTAL PROTEIN: 7 G/DL (ref 6.6–8.7)
TROPONIN: 0.25 NG/ML (ref 0–0.03)

## 2022-01-09 PROCEDURE — 71045 X-RAY EXAM CHEST 1 VIEW: CPT

## 2022-01-09 PROCEDURE — 2580000003 HC RX 258: Performed by: STUDENT IN AN ORGANIZED HEALTH CARE EDUCATION/TRAINING PROGRAM

## 2022-01-09 PROCEDURE — 84484 ASSAY OF TROPONIN QUANT: CPT

## 2022-01-09 PROCEDURE — 84132 ASSAY OF SERUM POTASSIUM: CPT

## 2022-01-09 PROCEDURE — 2500000003 HC RX 250 WO HCPCS

## 2022-01-09 PROCEDURE — 6360000002 HC RX W HCPCS: Performed by: STUDENT IN AN ORGANIZED HEALTH CARE EDUCATION/TRAINING PROGRAM

## 2022-01-09 PROCEDURE — 83735 ASSAY OF MAGNESIUM: CPT

## 2022-01-09 PROCEDURE — 94002 VENT MGMT INPAT INIT DAY: CPT

## 2022-01-09 PROCEDURE — 80053 COMPREHEN METABOLIC PANEL: CPT

## 2022-01-09 PROCEDURE — 2580000003 HC RX 258: Performed by: INTERNAL MEDICINE

## 2022-01-09 PROCEDURE — 2500000003 HC RX 250 WO HCPCS: Performed by: INTERNAL MEDICINE

## 2022-01-09 PROCEDURE — 84100 ASSAY OF PHOSPHORUS: CPT

## 2022-01-09 PROCEDURE — 6360000002 HC RX W HCPCS: Performed by: SURGERY

## 2022-01-09 PROCEDURE — 36415 COLL VENOUS BLD VENIPUNCTURE: CPT

## 2022-01-09 PROCEDURE — 99024 POSTOP FOLLOW-UP VISIT: CPT | Performed by: SURGERY

## 2022-01-09 PROCEDURE — 8090000000 HC CONTINUOUS VENOVENOUS HEMOFIL

## 2022-01-09 PROCEDURE — 82947 ASSAY GLUCOSE BLOOD QUANT: CPT

## 2022-01-09 PROCEDURE — 6360000002 HC RX W HCPCS

## 2022-01-09 PROCEDURE — 6370000000 HC RX 637 (ALT 250 FOR IP): Performed by: INTERNAL MEDICINE

## 2022-01-09 PROCEDURE — 31500 INSERT EMERGENCY AIRWAY: CPT

## 2022-01-09 PROCEDURE — 6370000000 HC RX 637 (ALT 250 FOR IP): Performed by: STUDENT IN AN ORGANIZED HEALTH CARE EDUCATION/TRAINING PROGRAM

## 2022-01-09 PROCEDURE — 2700000000 HC OXYGEN THERAPY PER DAY

## 2022-01-09 PROCEDURE — 36600 WITHDRAWAL OF ARTERIAL BLOOD: CPT

## 2022-01-09 PROCEDURE — 2500000003 HC RX 250 WO HCPCS: Performed by: STUDENT IN AN ORGANIZED HEALTH CARE EDUCATION/TRAINING PROGRAM

## 2022-01-09 PROCEDURE — 93005 ELECTROCARDIOGRAM TRACING: CPT | Performed by: STUDENT IN AN ORGANIZED HEALTH CARE EDUCATION/TRAINING PROGRAM

## 2022-01-09 PROCEDURE — 82803 BLOOD GASES ANY COMBINATION: CPT

## 2022-01-09 PROCEDURE — 87040 BLOOD CULTURE FOR BACTERIA: CPT

## 2022-01-09 PROCEDURE — 92950 HEART/LUNG RESUSCITATION CPR: CPT

## 2022-01-09 RX ORDER — VECURONIUM BROMIDE 1 MG/ML
10 INJECTION, POWDER, LYOPHILIZED, FOR SOLUTION INTRAVENOUS ONCE
Status: COMPLETED | OUTPATIENT
Start: 2022-01-09 | End: 2022-01-09

## 2022-01-09 RX ORDER — ETOMIDATE 2 MG/ML
20 INJECTION INTRAVENOUS ONCE
Status: COMPLETED | OUTPATIENT
Start: 2022-01-09 | End: 2022-01-09

## 2022-01-09 RX ORDER — PROPOFOL 10 MG/ML
5-50 INJECTION, EMULSION INTRAVENOUS
Status: DISCONTINUED | OUTPATIENT
Start: 2022-01-09 | End: 2022-01-10 | Stop reason: HOSPADM

## 2022-01-09 RX ORDER — ALBUMIN (HUMAN) 12.5 G/50ML
SOLUTION INTRAVENOUS
Status: DISCONTINUED
Start: 2022-01-09 | End: 2022-01-10 | Stop reason: HOSPADM

## 2022-01-09 RX ORDER — NALOXONE HYDROCHLORIDE 0.4 MG/ML
INJECTION, SOLUTION INTRAMUSCULAR; INTRAVENOUS; SUBCUTANEOUS
Status: COMPLETED
Start: 2022-01-09 | End: 2022-01-09

## 2022-01-09 RX ORDER — DOBUTAMINE HYDROCHLORIDE 200 MG/100ML
INJECTION INTRAVENOUS
Status: DISCONTINUED
Start: 2022-01-09 | End: 2022-01-10 | Stop reason: HOSPADM

## 2022-01-09 RX ORDER — NICOTINE POLACRILEX 4 MG
15 LOZENGE BUCCAL PRN
Status: DISCONTINUED | OUTPATIENT
Start: 2022-01-09 | End: 2022-01-10 | Stop reason: HOSPADM

## 2022-01-09 RX ORDER — DEXTROSE MONOHYDRATE 25 G/50ML
12.5 INJECTION, SOLUTION INTRAVENOUS ONCE
Status: COMPLETED | OUTPATIENT
Start: 2022-01-09 | End: 2022-01-09

## 2022-01-09 RX ORDER — DEXTROSE MONOHYDRATE 25 G/50ML
INJECTION, SOLUTION INTRAVENOUS
Status: COMPLETED
Start: 2022-01-09 | End: 2022-01-09

## 2022-01-09 RX ORDER — DEXTROSE MONOHYDRATE 25 G/50ML
50 INJECTION, SOLUTION INTRAVENOUS PRN
Status: DISCONTINUED | OUTPATIENT
Start: 2022-01-09 | End: 2022-01-10 | Stop reason: HOSPADM

## 2022-01-09 RX ORDER — NOREPINEPHRINE BIT/0.9 % NACL 16MG/250ML
2-100 INFUSION BOTTLE (ML) INTRAVENOUS CONTINUOUS
Status: DISCONTINUED | OUTPATIENT
Start: 2022-01-09 | End: 2022-01-10 | Stop reason: HOSPADM

## 2022-01-09 RX ORDER — NALOXONE HYDROCHLORIDE 0.4 MG/ML
0.4 INJECTION, SOLUTION INTRAMUSCULAR; INTRAVENOUS; SUBCUTANEOUS ONCE
Status: COMPLETED | OUTPATIENT
Start: 2022-01-09 | End: 2022-01-09

## 2022-01-09 RX ORDER — DEXTROSE MONOHYDRATE 25 G/50ML
25 INJECTION, SOLUTION INTRAVENOUS ONCE
Status: COMPLETED | OUTPATIENT
Start: 2022-01-09 | End: 2022-01-09

## 2022-01-09 RX ORDER — CALCIUM GLUCONATE 20 MG/ML
1000 INJECTION, SOLUTION INTRAVENOUS ONCE
Status: COMPLETED | OUTPATIENT
Start: 2022-01-09 | End: 2022-01-09

## 2022-01-09 RX ORDER — DEXTROSE MONOHYDRATE 25 G/50ML
12.5 INJECTION, SOLUTION INTRAVENOUS PRN
Status: DISCONTINUED | OUTPATIENT
Start: 2022-01-09 | End: 2022-01-10 | Stop reason: HOSPADM

## 2022-01-09 RX ORDER — DEXTROSE MONOHYDRATE 50 MG/ML
100 INJECTION, SOLUTION INTRAVENOUS PRN
Status: DISCONTINUED | OUTPATIENT
Start: 2022-01-09 | End: 2022-01-10 | Stop reason: HOSPADM

## 2022-01-09 RX ORDER — NALOXONE HYDROCHLORIDE 0.4 MG/ML
0.4 INJECTION, SOLUTION INTRAMUSCULAR; INTRAVENOUS; SUBCUTANEOUS PRN
Status: DISCONTINUED | OUTPATIENT
Start: 2022-01-09 | End: 2022-01-10 | Stop reason: HOSPADM

## 2022-01-09 RX ORDER — NOREPINEPHRINE BIT/0.9 % NACL 16MG/250ML
INFUSION BOTTLE (ML) INTRAVENOUS
Status: COMPLETED
Start: 2022-01-09 | End: 2022-01-09

## 2022-01-09 RX ORDER — CALCIUM GLUCONATE 94 MG/ML
1000 INJECTION, SOLUTION INTRAVENOUS ONCE
Status: DISCONTINUED | OUTPATIENT
Start: 2022-01-09 | End: 2022-01-09

## 2022-01-09 RX ORDER — MIDAZOLAM IN NACL,ISO-OSMOT/PF 50 MG/50ML
1-10 INFUSION BOTTLE (ML) INTRAVENOUS CONTINUOUS
Status: DISCONTINUED | OUTPATIENT
Start: 2022-01-09 | End: 2022-01-10 | Stop reason: HOSPADM

## 2022-01-09 RX ADMIN — Medication 45 MCG/MIN: at 11:45

## 2022-01-09 RX ADMIN — DEXTROSE MONOHYDRATE 12.5 G: 25 INJECTION, SOLUTION INTRAVENOUS at 13:45

## 2022-01-09 RX ADMIN — SODIUM BICARBONATE 50 MEQ: 84 INJECTION, SOLUTION INTRAVENOUS at 12:30

## 2022-01-09 RX ADMIN — HYDROCORTISONE SODIUM SUCCINATE 50 MG: 100 INJECTION, POWDER, FOR SOLUTION INTRAMUSCULAR; INTRAVENOUS at 17:50

## 2022-01-09 RX ADMIN — VECURONIUM BROMIDE 10 MG: 1 INJECTION, POWDER, LYOPHILIZED, FOR SOLUTION INTRAVENOUS at 12:45

## 2022-01-09 RX ADMIN — SODIUM BICARBONATE 100 MEQ: 84 INJECTION, SOLUTION INTRAVENOUS at 12:30

## 2022-01-09 RX ADMIN — ETOMIDATE 20 MG: 2 INJECTION INTRAVENOUS at 12:45

## 2022-01-09 RX ADMIN — HEPARIN SODIUM 5000 UNITS: 5000 INJECTION INTRAVENOUS; SUBCUTANEOUS at 05:50

## 2022-01-09 RX ADMIN — VASOPRESSIN 0.01 UNITS/MIN: 20 INJECTION INTRAVENOUS at 15:51

## 2022-01-09 RX ADMIN — HYDROMORPHONE HYDROCHLORIDE 0.5 MG: 1 INJECTION, SOLUTION INTRAMUSCULAR; INTRAVENOUS; SUBCUTANEOUS at 10:27

## 2022-01-09 RX ADMIN — Medication 90 MCG/MIN: at 16:33

## 2022-01-09 RX ADMIN — PIPERACILLIN AND TAZOBACTAM 2250 MG: 2; .25 INJECTION, POWDER, LYOPHILIZED, FOR SOLUTION INTRAVENOUS at 14:09

## 2022-01-09 RX ADMIN — PHENYLEPHRINE HYDROCHLORIDE 100 MCG/MIN: 10 INJECTION INTRAVENOUS at 17:52

## 2022-01-09 RX ADMIN — CALCIUM GLUCONATE 1000 MG: 20 INJECTION, SOLUTION INTRAVENOUS at 12:36

## 2022-01-09 RX ADMIN — Medication 52 MCG/MIN: at 14:35

## 2022-01-09 RX ADMIN — DEXTROSE MONOHYDRATE 25 G: 25 INJECTION, SOLUTION INTRAVENOUS at 12:30

## 2022-01-09 RX ADMIN — SODIUM ZIRCONIUM CYCLOSILICATE 5 G: 5 POWDER, FOR SUSPENSION ORAL at 12:41

## 2022-01-09 RX ADMIN — NALOXONE HYDROCHLORIDE 0.4 MG: 0.4 INJECTION, SOLUTION INTRAMUSCULAR; INTRAVENOUS; SUBCUTANEOUS at 11:45

## 2022-01-09 RX ADMIN — SODIUM BICARBONATE: 84 INJECTION, SOLUTION INTRAVENOUS at 15:11

## 2022-01-09 RX ADMIN — NALXONE HYDROCHLORIDE 0.4 MG: 0.4 INJECTION INTRAMUSCULAR; INTRAVENOUS; SUBCUTANEOUS at 11:45

## 2022-01-09 RX ADMIN — INSULIN HUMAN 10 UNITS: 100 INJECTION, SOLUTION PARENTERAL at 14:00

## 2022-01-09 RX ADMIN — INSULIN HUMAN 10 UNITS: 100 INJECTION, SOLUTION PARENTERAL at 13:15

## 2022-01-09 ASSESSMENT — PULMONARY FUNCTION TESTS
PIF_VALUE: 18
PIF_VALUE: 8.1
PIF_VALUE: 18
PIF_VALUE: 17
PIF_VALUE: 17
PIF_VALUE: 18
PIF_VALUE: 18
PIF_VALUE: 17
PIF_VALUE: 20
PIF_VALUE: 21
PIF_VALUE: 21

## 2022-01-09 ASSESSMENT — PAIN SCALES - GENERAL
PAINLEVEL_OUTOF10: 10
PAINLEVEL_OUTOF10: 0

## 2022-01-09 NOTE — PROGRESS NOTES
Daily Progress Note    Date:2022  Patient: Tatiana Okeefe.  : 1945  BJM:[id number]  CODE:Full Code No additional code details  PCP:Hermes Burnette    Admit Date: 2022  8:55 PM   LOS: 5 days       Subjective:   Patient became less responsive this am, lethargic, not following some commands with hypotension and difficulty obtaining ABG and SpO2, eventually noted hypoxia with agonal breathing. Transfer to ICU. Patient had received dose of pain medication earlier however no response to narcan. Required emergent intubation due to GCS<8, not protecting airway, acute hypoxic respiratory failure. Required vasopressor support to maintain MAP. Developed transient bradycardia with pads in place to pace if needed. ABG obtained revealed metabolic acidosis with hyperkalemia.           Review of Systems    Unable to complete due to AMS      Objective:      Vital signs in last 24 hours:  Patient Vitals for the past 24 hrs:   BP Temp Temp src Pulse Resp SpO2 Weight   22 1150    66      22 0534 98/68 97 °F (36.1 °C) Temporal 106 20 92 % 143 lb (64.9 kg)   22 1945 123/89 97.8 °F (36.6 °C) Temporal 133 20 94 %      Physical exam    General: Ill-appearing, intubated  HEENT: NC/AT, no scleral icterus, ET tube in place  Neck: Trachea midline  Respiratory: abnormal breathing pattern  Cardiovascular: normal rhythm, pulses 2+ and equal  Abdomen: tenderness w/ guarding, BS hypoactive  Musculoskeletal: No deformities  Skin: Warm and dry      Lab Review   Recent Results (from the past 24 hour(s))   Basic Metabolic Panel    Collection Time: 22  2:02 AM   Result Value Ref Range    Sodium 141 136 - 145 mmol/L    Potassium 5.0 3.5 - 5.0 mmol/L    Chloride 94 (L) 98 - 111 mmol/L    CO2 19 (L) 22 - 29 mmol/L    Anion Gap 28 (H) 7 - 19 mmol/L    Glucose 101 74 - 109 mg/dL    BUN 93 (H) 8 - 23 mg/dL    CREATININE 9.0 (H) 0.5 - 1.2 mg/dL    GFR Non- 6 (A) >60    GFR  7 (L) >59 Calcium 9.8 8.8 - 10.2 mg/dL       I/O last 3 completed shifts: In: 10 [I.V.:10]  Out: 60 [Urine:60]  No intake/output data recorded.       Current Facility-Administered Medications:     norepinephrine (LEVOPHED) 16 mg in sodium chloride 0.9 % 250 mL infusion, 2-100 mcg/min, IntraVENous, Continuous, Dk Adams MD    naloxone [facility]) injection 0.4 mg, 0.4 mg, IntraVENous, Once, Dk Adams MD    naloxone [facility]) 0.4 MG/ML injection, , , ,     naloxone (NARCAN) injection 0.4 mg, 0.4 mg, IntraVENous, PRN, Dk Adams MD    norepinephrine-sodium chloride (LEVOPHED) 16-0.9 MG/250ML-% infusion, , , ,     piperacillin-tazobactam (ZOSYN) 2,250 mg in dextrose 5 % 50 mL IVPB (mini-bag), 2,250 mg, IntraVENous, Q8H, Dk Adams MD    [Held by provider] carvedilol (COREG) tablet 12.5 mg, 12.5 mg, Oral, BID WC, Dk Adams MD, 12.5 mg at 01/08/22 1956    zolpidem (AMBIEN) tablet 5 mg, 5 mg, Oral, Nightly PRN, Jess Patel MD, 5 mg at 01/08/22 1955    [Held by provider] dilTIAZem (CARDIZEM CD) extended release capsule 120 mg, 120 mg, Oral, Daily, Dk Adams MD, 120 mg at 01/08/22 1956    famotidine (PEPCID) injection 20 mg, 20 mg, IntraVENous, Once, Carlota Joseph MD    sodium chloride flush 0.9 % injection 5-40 mL, 5-40 mL, IntraVENous, 2 times per day, Mike Aleman DO, 10 mL at 01/08/22 2110    sodium chloride flush 0.9 % injection 5-40 mL, 5-40 mL, IntraVENous, PRN, Abiel Rodriguez DO    0.9 % sodium chloride infusion, 25 mL, IntraVENous, PRN, Abiel Rodriguez DO    ondansetron (ZOFRAN-ODT) disintegrating tablet 4 mg, 4 mg, Oral, Q8H PRN **OR** ondansetron (ZOFRAN) injection 4 mg, 4 mg, IntraVENous, Q6H PRN, Abiel Rodriguez DO    [Held by provider] heparin (porcine) injection 5,000 Units, 5,000 Units, SubCUTAneous, 3 times per day, Mike Aleman DO, 5,000 Units at 01/09/22 0550    oxyCODONE (ROXICODONE) immediate release tablet 5 mg, 5 mg, Oral, Q4H PRN, Alfredo Castellanos DO    HYDROmorphone HCl PF (DILAUDID) injection 0.5 mg, 0.5 mg, IntraVENous, Q3H PRN, Alfredo Castellanos DO, 0.5 mg at 01/08/22 1327    allopurinol (ZYLOPRIM) tablet 200 mg, 200 mg, Oral, Daily, Abiel Rodriguez DO    [Held by provider] aspirin chewable tablet 81 mg, 81 mg, Oral, Daily, Abiel Rodriguez DO    calcitRIOL (ROCALTROL) capsule 0.5 mcg, 0.5 mcg, Oral, Daily, Abiel Rodriguez DO    cloNIDine (CATAPRES) tablet 0.1 mg, 0.1 mg, Oral, PRN, Abiel Rodriguez DO    DULoxetine (CYMBALTA) extended release capsule 60 mg, 60 mg, Oral, Daily, Abiel Rodriguez DO    [Held by provider] ferrous sulfate (IRON 325) tablet 325 mg, 325 mg, Oral, TID WC, Alfredo Castellanos DO, 325 mg at 01/08/22 1956    [Held by provider] isosorbide mononitrate (IMDUR) extended release tablet 120 mg, 120 mg, Oral, Daily, Abiel Rodriguez DO, 120 mg at 01/05/22 1456    ranolazine (RANEXA) extended release tablet 500 mg, 500 mg, Oral, BID, Abiel Rodriguez DO, 500 mg at 01/08/22 2110    ascorbic acid (VITAMIN C) tablet 500 mg, 500 mg, Oral, BID, Abiel Rodriguez DO, 500 mg at 01/08/22 2110    lactated ringers infusion, , IntraVENous, Continuous, Alfredo Castellanos DO, Last Rate: 100 mL/hr at 01/05/22 0811, NoRateChange at 01/05/22 0811    ondansetron (ZOFRAN) injection 4 mg, 4 mg, IntraVENous, Q6H PRN, Alfredo Castellanos DO, 4 mg at 01/04/22 2257    acetaminophen (TYLENOL) tablet 650 mg, 650 mg, Oral, Q4H PRN, Alfredo Castellanos DO    HYDROmorphone HCl PF (DILAUDID) injection 0.5 mg, 0.5 mg, IntraVENous, Q4H PRN, Alfredo Castellanos DO, 0.5 mg at 01/09/22 1027    simethicone (MYLICON) chewable tablet 80 mg, 80 mg, Oral, BID, Abiel Rodriguez DO, 80 mg at 01/08/22 2110      Assessment/plan  Active Problems:    Atrial flutter with rapid ventricular response (HCC)    Small bowel obstruction (HCC)    ESRD (end stage renal disease) ([facility])  Resolved Problems:    * No resolved hospital problems. *    Distributive Shock, ?intra-abdominal source  --blood cultures  --empiric Abx  --prn vasopressor support to maintain MAP    Acute Hypoxic Respiratory Failure  -mechanical vent support, prn ABG, adjust settings   -sedation to maintain vent synchrony  -f/u CXR    Hyperkalemia, Metabolic acidosis with ESRD, refused multiple dialysis sessions  --medical management of potassium, follow K closely  --dialysis per Nephrology    Hypoglycemia  -amp D50  -follow closely, hypoglycemia protocol     Postoperative SBO/ileus, s/p ex lap with lysis of adhesions  Management with NG tube  Care per general surgery    Episodes of atrial flutter with rapid response  Currently in sinus rhythm, intermittently tachycardic  Continue Cardizem drip   Continue Coreg and Cardizem via NG tube or orally if able to tolerate, uptitrate meds and wean off Cardizem drip as able    DVT prophylaxis: per primary service    Critical Care Time of 60 minutes including evaluation, chart review, management plan and frequent reassessment of response to treatment      Nimisha Navas MD 1/9/2022 11:55 AM

## 2022-01-09 NOTE — PROGRESS NOTES
Ng tube resinserted after 2 attempts. Placed left nostril at the 70 cm marked secured and placement verifed with air bolus and gastric contents. Restraints reapplied at this time. Spoke with pt about the importance of not pulling out tube. Pt nodded head yes in understanding.  Electronically signed by Melvin Veronica RN on 1/8/2022 at 11:11 PM

## 2022-01-09 NOTE — PROGRESS NOTES
Pt has again removed ng with restraints in place. Pt not agreeable to placing ng back at this time.  Electronically signed by Maida Jhaveri RN on 1/8/2022 at 11:14 PM

## 2022-01-09 NOTE — PROGRESS NOTES
Pt's significant other is still wanting pt to be transferred to [place]. I have assured her that Dr Mariaelena Everett stated he would come to see and speak with her. Stated she will wait but still wants him transferred.  Electronically signed by Corinne Dan, RN on 1/9/2022 at 2:35 PM

## 2022-01-09 NOTE — PROGRESS NOTES
Nephrology ([facility]) Progress Note    Patient:  Jelena Snowden Sr.  YOB: 1945  Date of Service: 1/9/2022  MRN: [id number]   Acct: [de-identified]   Primary Care Physician: Mariola Hodges  Advance Directive: Full Code  Admit Date: 1/4/2022       Hospital Day: 5  Referring Provider: Hugo Beaulieu MD    Patient Seen, Chart, Consults notes, Labs, Radiology studies reviewed. Subjective:  Patient is a 60-year-old man with a past medical history of hypertension coronary disease and diabetes. Patient has been on chronic maintenance hemodialysis via a right IJ PermCath for the last 5 months. He is wanting to switch to peritoneal dialysis. He has an abdominal wall hernia and needed repair before receiving a peritoneal dialysis catheter. He has a previous umbilical hernia repaired. Patient then underwent repair of 2 ventral hernias on 12/29. He then felt distended and he was not feeling well. He started having some loose bowel movements and was complaining of nausea. He presented to a local hospital and was then transferred to [facility] in [place]. Patient underwent on 1/5 exploratory laparotomy with lysis of small adhesion of the right lower quadrant as well as mesh explantation. Renal service was consulted to manage his ESRD. Patient has missed weeks of dialysis when he was not feeling well. On 1/5, his labs showed a serum potassium of 5.6 and a serum bicarbonate of 13 with BUN at 109 and creatinine at 9.3. Patient is s/p dialysis on 1/5. He continues to have some abdominal pain and has refused to come to dialysis on 1/6. He is believed to have ileus. Patient is s/p short dialysis treatment on 1/7. He refused to do more dialysis. Today, patient developed severe hemodynamic compromise. He was bradycardic and severely hypotensive. He was also hypoglycemic and poorly responsive. He required transfer to ICU. He got intubated and placed on high-dose of Levophed.   Earlier today his serum potassium was at 5 and his creatinine was 9. His repeat labs showed now serum potassium at 8.6, serum bicarbonate is 14, and a creatinine of 10. Patient is oligoanuric. Allergies:  Patient has no known allergies.     Medicines:  Current Facility-Administered Medications   Medication Dose Route Frequency Provider Last Rate Last Admin    norepinephrine (LEVOPHED) 16 mg in sodium chloride 0.9 % 250 mL infusion  2-100 mcg/min IntraVENous Continuous Kristan Cartagena MD 32.8 mL/hr at 01/09/22 1222 35 mcg/min at 01/09/22 1222    naloxone (NARCAN) injection 0.4 mg  0.4 mg IntraVENous Once Kristan Cartagena MD        naloxone [facility]) 0.4 MG/ML injection             naloxone (NARCAN) injection 0.4 mg  0.4 mg IntraVENous PRN Kristan Cartagena MD        piperacillin-tazobactam (ZOSYN) 2,250 mg in dextrose 5 % 50 mL IVPB (mini-bag)  2,250 mg IntraVENous Q8H Kristan Cartagena MD        dextrose 50 % solution             sodium bicarbonate 8.4 % injection 100 mEq  100 mEq IntraVENous Once Kristan Cartagena MD        dextrose 50 % IV solution  50 g IntraVENous PRN Kristan Cartagena MD        insulin regular (HUMULIN R;NOVOLIN R) injection 10 Units  10 Units IntraVENous Once Kristan Cartagena MD        And    dextrose 50 % IV solution  25 g IntraVENous Once Kristan Cartagena MD        glucose (GLUTOSE) 40 % oral gel 15 g  15 g Oral PRN Kristan Cartagena MD        dextrose 50 % IV solution  12.5 g IntraVENous PRN Kristan Cartagena MD        glucagon (rDNA) injection 1 mg  1 mg IntraMUSCular PRN Kristan Cartagena MD        dextrose 5 % solution  100 mL/hr IntraVENous PRN Kristan Cratagena MD        sodium bicarbonate 8.4 % injection 50 mEq  50 mEq IntraVENous Once Kristan Cartagena MD        calcium gluconate 1000 mg in sodium chloride 50 mL  1,000 mg IntraVENous Once Kristan Cartagena MD 50 mL/hr at 01/09/22 1236 1,000 mg at 01/09/22 1236    insulin regular (HUMULIN R;NOVOLIN R) injection 10 Units  10 Units cloNIDine (CATAPRES) tablet 0.1 mg  0.1 mg Oral PRN Sherryle South, DO        DULoxetine (CYMBALTA) extended release capsule 60 mg  60 mg Oral Daily Abiel Rodriguez,         [Held by provider] ferrous sulfate (IRON 325) tablet 325 mg  325 mg Oral TID WC Sherryle South, DO   325 mg at 01/08/22 1956    [Held by provider] isosorbide mononitrate (IMDUR) extended release tablet 120 mg  120 mg Oral Daily Sherryle South, DO   120 mg at 01/05/22 1456    ranolazine (RANEXA) extended release tablet 500 mg  500 mg Oral BID Sherryle South, DO   500 mg at 01/08/22 2110    ascorbic acid (VITAMIN C) tablet 500 mg  500 mg Oral BID Sherryle South, DO   500 mg at 01/08/22 2110    lactated ringers infusion   IntraVENous Continuous [facility]  mL/hr at 01/05/22 0811 NoRateChange at 01/05/22 0811    ondansetron (ZOFRAN) injection 4 mg  4 mg IntraVENous Q6H PRN Sherryle South, DO   4 mg at 01/04/22 2257    acetaminophen (TYLENOL) tablet 650 mg  650 mg Oral Q4H PRN Sherryle South, DO        HYDROmorphone HCl PF (DILAUDID) injection 0.5 mg  0.5 mg IntraVENous Q4H PRN Sherryle South, DO   0.5 mg at 01/09/22 1027    simethicone (MYLICON) chewable tablet 80 mg  80 mg Oral BID Sherryle South, DO   80 mg at 01/08/22 2110       Past Medical History:  Past Medical History:   Diagnosis Date    CAD (coronary artery disease)     Chronic kidney disease     Diabetes [facility]     Heart disease     Hemodialysis patient ([facility]     Hyperlipidemia     Hypertension        Past Surgical History:  Past Surgical History:   Procedure Laterality Date    CORONARY ANGIOPLASTY WITH STENT PLACEMENT      LAPAROTOMY N/A 1/5/2022    LAPAROTOMY EXPLORATORY, LYSIS OF ADHESION performed by Sherryle South, DO at [address] [place] [address] N/A 12/29/2021    LAPAROSCOPIC PRIMARY EPIGASTRIC HERNIA REPAIR WITH MESH performed by Sherryle South, DO at [address] [place] [address] Family History  No family history on file. Social History  Social History     Socioeconomic History    Marital status: Single     Spouse name: Not on file    Number of children: Not on file    Years of education: Not on file    Highest education level: Not on file   Occupational History    Not on file   Tobacco Use    Smoking status: Former Smoker    Smokeless tobacco: Never Used   Vaping Use    Vaping Use: Never used   Substance and Sexual Activity    Alcohol use: Never    Drug use: Never    Sexual activity: Not on file   Other Topics Concern    Not on file   Social History Narrative    Not on file     Social Determinants of Health     Financial Resource Strain:     Difficulty of Paying Living Expenses: Not on file   Food Insecurity:     Worried About [address] in the Last Year: Not on file    [address] in the Last Year: Not on file   Transportation Needs:     Lack of Transportation (Medical): Not on file    Lack of Transportation (Non-Medical):  Not on file   Physical Activity:     Days of Exercise per Week: Not on file    Minutes of Exercise per Session: Not on file   Stress:     Feeling of Stress : Not on file   Social Connections:     Frequency of Communication with Friends and Family: Not on file    Frequency of Social Gatherings with Friends and Family: Not on file    Attends Ca[employer] Services: Not on file    Active Member of [address] [place] [address] or Organizations: Not on file    Attends Club or Organization Meetings: Not on file    Marital Status: Not on file   Intimate Partner Violence:     Fear of Current or Ex-Partner: Not on file    Emotionally Abused: Not on file    Physically Abused: Not on file    Sexually Abused: Not on file   Housing Stability:     Unable to Pay for Housing in the Last Year: Not on file    Number of [place] in the Last Year: Not on file    Unstable Housing in the Last Year: Not on file         Review of Systems:  Unable to obtain      Objective:  Blood pressure 98/68, pulse 66, temperature 97 °F (36.1 °C), temperature source Temporal, resp. rate 21, height 5' 11\" (1.803 m), weight 143 lb (64.9 kg), SpO2 92 %. No intake or output data in the 24 hours ending 01/09/22 1313  General: Intubated, vented  Chest: Bilateral air entry with scattered rales and diminished breath sounds in the bases  CVS: regular rate and rhythm  Abdominal:distended, no guarding  Extremities: no cyanosis trace leg edema  Skin: warm and dry without rash    Labs:  BMP:   Recent Labs     01/07/22  0303 01/09/22  0202 01/09/22  1159 01/09/22  1209    141  --  138   K 5.0 5.0 9.1 8.6*   CL 98 94*  --  91*   CO2 18* 19*  --  14*   PHOS  --   --   --  17.3*   BUN 98* 93*  --  104*   CREATININE 9.0* 9.0*  --  10.0*   CALCIUM 9.1 9.8  --  10.1     CBC:   Recent Labs     01/07/22  0303   WBC 7.2   HGB 10.2*   HCT 30.4*   MCV 93.3        LIVER PROFILE:   Recent Labs     01/09/22  1209   *   *   BILITOT 0.6   ALKPHOS 113     PT/INR: No results for input(s): PROTIME, INR in the last 72 hours. APTT: No results for input(s): APTT in the last 72 hours. BNP:  No results for input(s): BNP in the last 72 hours. Ionized Calcium:No results for input(s): IONCA in the last 72 hours. Magnesium:  Recent Labs     01/09/22  1209   MG 3.4*     Phosphorus:  Recent Labs     01/09/22  1209   PHOS 17.3*     HgbA1C: No results for input(s): LABA1C in the last 72 hours. Hepatic:   Recent Labs     01/09/22  1209   ALKPHOS 113   *   *   PROT 7.0   BILITOT 0.6   LABALBU 3.2*     Lactic Acid: No results for input(s): LACTA in the last 72 hours. Troponin: No results for input(s): CKTOTAL, CKMB, TROPONINT in the last 72 hours. ABGs: No results for input(s): PH, PCO2, PO2, HCO3, O2SAT in the last 72 hours. CRP:  No results for input(s): CRP in the last 72 hours. Sed Rate:  No results for input(s): SEDRATE in the last 72 hours.     Cultures:   No results for input(s): CULTURE in the last 72 hours. Radiology reports as per the Radiologist  Radiology: XR ABDOMEN (2 VIEWS)    Result Date: 1/5/2022  Examination. XR ABDOMEN (2 VIEWS) 1/4/2022 11:18 PM History: Small bowel obstruction. Supine and upright views of the abdomen are obtained. There is no previous study for comparison. There is moderate dilatation of small bowel loops with air-fluid levels. The bowel loops measure up to 5.5 cm in diameter. The distal small bowel and in the right iliac fossa appears of normal size. The large bowel is decompressed. There is moderate amount of air in the stomach with air-fluid level. No evidence of free air. The kidneys are obscured by the bowel gas. No definite radiopaque calculi are seen. The limited visualized lungs appear unremarkable. Atheromatous changes of coronary arteries and the right coronary stenting is noted. No focal bony abnormality. 1. Distal small bowel obstruction. Signed by Dr Sukhdev Baron   1. ESRD  2. Type 2 DM with renal disease  3. Repair of ventral hernia  4. Abdominal pain  5. Nausea  6. Hyperkalemia  7. Metabolic acidosis  8. Post-operative ileus  9. Sepsis with shock  10. Severe hyper phosphatemia      Plan: We will offer urgent dialysis today due to severe change in status. . Conitnue NG tube. Continue pressure support. We will offer SLED since conventional hemodialysis may not be tolerated due to severe hypotension. Follow up labs. Discussed with nursing. Dextrose IV along with insulin IV and intravenous bicarbonate will be administered to help to lower the serum potassium pending dialysis. Patient is very likely in septic shock. Prognosis is poor. Critical care time spent was 35 minutes.

## 2022-01-09 NOTE — PROGRESS NOTES
Dr Froylan Mark returned call and will be in to see pt. I informed him that the pt's family is wanting him transferred to [facility]. Dr Deb Ang is in the room at present speaking with the pt's family. I had explained to pt's significant other a short time ago that he needed an accepting doctor, but she is willing to sign AMA papers and go to [facility] ER.  Electronically signed by Jose Alfredo Shea RN on 1/9/2022 at 2:29 PM

## 2022-01-09 NOTE — PROGRESS NOTES
Pt intubated per Dr. Shannon Emerson with 7.5 Etube at 24 cm francois.  Pt placed on vent at AC/VC 14 400 100% 5 peep

## 2022-01-09 NOTE — PROGRESS NOTES
NG tube inserted into right nare. Secured with device at 79. Patient tolerated well. Bloody drainage noted. Chest Xray ordered.  Electronically signed by Isamar Samson RN on 1/9/2022 at 7:02 AM

## 2022-01-09 NOTE — PROGRESS NOTES
Pt transferred to ICU per order Dr Ashvin Phoenix. With myself and transport. Pt remained on telemetry for transfer. Report to Eriberto Wang RN in ICU.  Electronically signed by Germaine Brito RN on 1/9/2022 at 12:31 PM

## 2022-01-09 NOTE — PROGRESS NOTES
Chest Xray results note that NG needs to be advanced slightly more into the stomach. Advanced tube to 75. Repeat chest Xray ordered.  Electronically signed by Neto Lee RN on 1/9/2022 at 7:04 AM

## 2022-01-09 NOTE — PROGRESS NOTES
Low   10.2 Low       O2 Sat, Arterial >92 % 97.1  97.5    Carboxyhgb, Arterial 0.0 - 5.0 % 2.4  1.4 CM    Comment:      0.0-1.5   (Smokers 1.5-5.0)    Methemoglobin, Arterial <1.5 % 0.5  0.7    O2 Content, Arterial Not Established mL/dL 11.6  14.3    O2 Therapy  Unknown  Unknown    Resulting Agency  [facility] [address] [facility]              Specimen Collected: 01/09/22 14:05 Last Resulted: 01/09/22 14:16        Lab Flowsheet     Order Details     View Encounter     Lab and Collection Details     Routing     Result History        CM=Additional comments          Result Care Coordination      Patient Communication    Released Not seen Back to Top             Testing Performed By:    [address] [place] [address] Drive LAB   [address] [place] [address]   Tel: [phone]   : Rsa Amaya M.D.                Result Information    Flag: Critical Panic   Status: Edited Result - FINAL (Collected: 1/9/2022 14:05) Provider Status: Ordered     Blood Gas, Arterial: Patient Communication    Released Not seen     Routing History    Priority Sent On From To Message Type    1/6/2022  3:20 AM [facility] Incoming Lab Results From Charlestine Phoenix, MD     1/6/2022  3:20 AM [facility] Incoming Lab Results From [employer], DO     1/6/2022  3:20 AM [facility] Incoming Lab Results From Vickey Perez MD    Click to Print Result      View SmartLink Info    Blood Gas, Arterial (Order #[id number]) on 1/9/22     Order Report    Order Details  vc 18 400%  +5 rb

## 2022-01-09 NOTE — PROGRESS NOTES
Pt's family still insistant on transfer to [facility]. She was wondering when the paperwork would be done and when the ambulance would be here. I explained that if she is going against medical advice that she would have to take pt in the car. She wanted the paperwork to leave. When I took AMA paper in , for her to sign, I found out she was not the legal POA or spouse. I re-made the paper in the pt's name and when I took in for him to sign, he was agonal breathing and somnolent. Dr Corazon Parrish was on the floor and notified. We attempted several different ways to obtain a Sat and even ABG's were ordered , but not obtained as Dr Corazon Parrish was moving to ICU.  Electronically signed by Howard Vicente RN on 1/9/2022 at 2:46 PM

## 2022-01-09 NOTE — PROGRESS NOTES
Patient verbally requests he be restrained so he does not pull out NG tube. Notified physician. Orders placed.      Electronically signed by Gabriele Weathers RN on 1/9/2022 at 7:11 AM

## 2022-01-09 NOTE — PROGRESS NOTES
[facility] General [facility]    Progress Note    POD # 4    Subjective:    Seen in the intensive care unit. He was transferred there earlier today and is now intubated and sedated. I discussed his situation earlier with Dr. Vinay Bernal. Family at bedside. Objective:    Vitals:    01/09/22 1201 01/09/22 1420 01/09/22 1421 01/09/22 1428   BP:       Pulse:   110 109   Resp: 21 18 17 18   Temp:       TempSrc:       SpO2:       Weight:       Height:         I/O last 3 completed shifts: In: 10 [I.V.:10]  Out: 60 [Urine:60]     Abdomen is soft and less distended. No apparent tenderness but difficult to tell. LABS:   CBC:   Recent Labs     01/07/22  0303   WBC 7.2   RBC 3.26*   HGB 10.2*   HCT 30.4*         BMP:   Recent Labs     01/07/22  0303 01/09/22  0202 01/09/22  1159 01/09/22  1209 01/09/22  1405    141  --  138  --    K 5.0 5.0 9.1 8.6* 5.2   CL 98 94*  --  91*  --    CO2 18* 19*  --  14*  --    ANIONGAP 24* 28*  --  33*  --    GLUCOSE 118* 101  --  15*  --    CREATININE 9.0* 9.0*  --  10.0*  --    LABGLOM 6* 6*  --  5*  --    CALCIUM 9.1 9.8  --  10.1  --      LFT:   Recent Labs     01/09/22  1209   PROT 7.0   LABALBU 3.2*   BILITOT 0.6   ALKPHOS 113   *   *       Assessment:    1. Postop day 4 following laparotomy and lysis of adhesions with removal of mesh. 2.  Profound hyperkalemia with associated acidosis. This seems to be related to his renal failure and refusal to undergo dialysis regularly over the last week or so. 3.  Mild postop ileus that seems to be resolving. Plan:    1. Continue ventilatory support as needed. 2.  Nephrology service plans to start continuous hemodialysis this afternoon. 3.  Orogastric tube in place. Would leave that for now and use it to low intermittent suction. Can also be used for medication administration. 4.  Otherwise continue with routine medical care. 5.  Dr. Leonel Smith will resume his care tomorrow morning.

## 2022-01-09 NOTE — PROGRESS NOTES
Component Ref Range & Units 1/9/22 1405 1/9/22 1159   pH, Arterial 7.350 - 7.450 7.150 Low Panic   7.110 Low Panic     pCO2, Arterial 35.0 - 45.0 mmHg 47. 0 High   38.0    pO2, Arterial 80.0 - 100.0 mmHg 125. 0 High   159. 0 High     HCO3, Arterial 22.0 - 26.0 mmol/L 16.4 Low   12.1 Low     Base Excess, Arterial -2.0 - 2.0 mmol/L -11.7 Low   -16.4 Low     Hemoglobin, Art, Extended 14.0 - 18.0 g/dL 8.3 Low   10.2 Low     O2 Sat, Arterial >92 % 97.1  97.5    Carboxyhgb, Arterial 0.0 - 5.0 % 2.4  1.4 CM    Comment:      0.0-1.5   (Smokers 1.5-5.0)    Methemoglobin, Arterial <1.5 % 0.5  0.7    O2 Content, Arterial Not Established mL/dL 11.6  14.3    O2 Therapy  Unknown  Unknown    Resulting Agency  [facility]              Specimen Collected: 01/09/22 14:05 Last Resulted: 01/09/22 14:16        Lab Flowsheet     Order Details     View Encounter     Lab and Collection Details     Routing     Result History        CM=Additional comments          Result Care Coordination      Patient Communication    Released Not seen Back to Top             Testing Performed By:    [address] [place] [address] LAB   [address] [place] [address]   Tel: [phone]   : Aurelia Horan M.D.                Result Information    Flag: Critical Panic   Status: Edited Result - FINAL (Collected: 1/9/2022 14:05) Provider Status: Ordered     Blood Gas, Arterial: Patient Communication    Released Not seen     Routing History    Priority Sent On From To Message Type    1/6/2022  3:20 AM [facility] Incoming Lab Results From Parish Reid MD     1/6/2022  3:20 AM [facility] Incoming Lab Results From [employer], DO     1/6/2022  3:20 AM [facility] Incoming Lab Results From Camila Cevallos MD    Click to Print Result      View [employer] Info    Blood Gas, Arterial (Order #[id number]) on 1/9/22     Order Report    Order Details    vc 18 400 +5 60% rb

## 2022-01-09 NOTE — PROGRESS NOTES
Dr. Benny Ramirez approved giving dilaudid early for attempt of replacement of NG tube. Tube retracted from mouth and realigned in the esophagus. Patient took small sips of water with advancement. Had an episode of vomiting near the end of placement. Tube secured at 75. Attached to low intermittent suction. Green bile noted. 1000 ml drained from patient. Patient expresses relief. Chest Xray confirmed placement.    Electronically signed by Prudencio Connors RN on 1/9/2022 at 7:10 AM

## 2022-01-09 NOTE — PROGRESS NOTES
Patient arrived from PCU unresponsive with agonal breathing and heart rate in the 40's. Dr. Lupillo Gallagher and Dr. Matthew Bates along with RT and RN staff at bedside. Patient externally paced at 60 BPM  1mg of atropine given @ 1143  20 mg etomidate @ 1144  10 mg of Vecc @ 1144    Size 7.5 ETT with bilateral breaths sounds and good color change 24 @ the lip.

## 2022-01-09 NOTE — PROGRESS NOTES
Contains critical data BLOOD GAS, ARTERIAL     Status: Final result    Component Ref Range & Units 01/09/22 1159   pH, Arterial 7.350 - 7.450 7.110 Low Panic     pCO2, Arterial 35.0 - 45.0 mmHg 38.0    pO2, Arterial 80.0 - 100.0 mmHg 159. 0 High     HCO3, Arterial 22.0 - 26.0 mmol/L 12.1 Low     Base Excess, Arterial -2.0 - 2.0 mmol/L -16.4 Low     Hemoglobin, Art, Extended 14.0 - 18.0 g/dL 10.2 Low     O2 Sat, Arterial >92 % 97.5    Carboxyhgb, Arterial 0.0 - 5.0 % 1.4    Comment:      0.0-1.5   (Smokers 1.5-5.0)    Methemoglobin, Arterial <1.5 % 0.7    O2 Content, Arterial Not Established mL/dL 14.3    O2 Therapy  Unknown    Resulting Agency  [facility]     Narrative    CALL  [facility] tel. ,   Dileep Freeman MD, 01/09/2022 12:00, by CROELLE        Specimen Collected: 01/09/22 1159 Last Resulted: 01/09/22 1200 View Other Order Details        Pt on AC/VC 14 400 100% 5 peep

## 2022-01-09 NOTE — PROGRESS NOTES
Pt and wife requested restraints to keep patient from pulling out ng tube. Restraints were applied to bilat wrists per previous shift. Pt was able to pull out ng tube while restrained. Will attempt to reinsert.  Electronically signed by Doris Shrestha RN on 1/8/2022 at 11:02 PM

## 2022-01-09 NOTE — PROGRESS NOTES
Dr Gabby Mckeon answering service notified of need to speak with him re: this pt.  Electronically signed by Jack Dye RN on 1/9/2022 at 2:27 PM

## 2022-01-10 LAB
EKG P AXIS: NORMAL DEGREES
EKG P-R INTERVAL: NORMAL MS
EKG Q-T INTERVAL: 544 MS
EKG QRS DURATION: 110 MS
EKG QTC CALCULATION (BAZETT): 520 MS
EKG T AXIS: 141 DEGREES

## 2022-01-10 NOTE — PROGRESS NOTES
Patient started on SLED at 56. He was on maximum doses of 3 different vasopressors (see MAR). Patient BP dropped and HR started declining. Pacer was attached and set to a backup rate of 60. We lost pulses and Code Blue was called. Family notified and arrived at end of code. ROSC was achieved but patient quickly lost pulses again. Family decided at that time to stop CPR. MD and Clinical House supervisor at bedside. Patient passed with family at bedside. Time of Death was 1858.

## 2022-01-10 NOTE — DISCHARGE SUMMARY
Discharge Summary    NAME: Lin Lara Sr.  :  1945  MRN:  [id number]    Admit date:  2022  Discharge date:  2022    Admitting Physician:  Sky Mendoza MD      Primary Care Physician:  Kristopher Sandoval    Discharge Diagnoses: Active Problems:  Circulatory shock    Atrial flutter with rapid ventricular response (HCC)    Small bowel obstruction (HCC)  Hypokalemia  Metabolic acidosis    ESRD (end stage renal disease) [facility])              Hospital Course: 81-year-old male with ESRD but noncompliant with dialysis admitted by general surgery for postop small bowel obstruction. He had recently undergone laparoscopic hernia repair due to ventral hernias on  in anticipation of being able to start peritoneal dialysis. Within a day after surgery his abdomen began to distend and developed nausea subsequently transferred to [facility] evening of 2022. Patient notably missed multiple hemodialysis sessions over the preceding week. Hospital course was complicated by development of intermittent episodes of SVT felt to be atrial flutter with rapid response following cardiology evaluation. Patient managed on Cardizem drip while n.p.o. With improvement in rate and converted back to sinus rhythm. Patient did undergo dialysis while inpatient but did refuse dialysis session on , then underwent short also treatment on , subsequently refused to do more dialysis. On postoperative day 4  patient developed worsening mental status, hypotension and bradycardia, also noted to be hypoglycemic, as well as hyperkalemic with severe metabolic acidosis, with hypoxia and transferred to the ICU. Due to hypoxic respiratory failure and encephalopathy he was intubated. He required vasopressor support to maintain blood pressure. Patient was started on SLED dialysis due to hemodynamic instability.   Despite interventions including escalating doses of vasopressor support, developed worsening hypotension and sustained PEA cardiac arrest approximately 1830 on 1/9, underwent CPR with multiple doses of epinephrine, regaining ROSC transiently at 1844. However developed cardiac arrest again with PEA at [facility] and family at bedside asked to stop any further resuscitation given what they felt would be patient's wishes.       Time of death: 1/9/2022 at [facility]    Cause of death: Distributive shock, hyperkalemia, metabolic acidosis, ESRD      Signed:  Jackie Alexander MD  1/9/2022 7:00 PM

## 2022-01-10 NOTE — DEATH NOTES
Death Pronouncement Note  Patient's Name: Tello Banks Sr.   Patient's YOB: 1945  MRN Number: [id number]    Admitting Provider: Alexx Woo MD  Attending Provider: Alexx Woo MD    Patient was examined and the following were absent: Pulses, Blood Pressure and Respiratory effort    I declared the patient dead on 1/9/2022 at [phone]    Preliminary Cause of Death: Distributive shock, hyperkalemia, metabolic acidosis, ESRD    Electronically signed by Theron Brown MD on 1/9/22 at 7:11 PM CST

## 2022-01-10 NOTE — SIGNIFICANT EVENT
Patient became bradycardic and hypotensive despite pressors 1 mg of atropine given @ [phone] CPR initiated, code blue called  1834-epi given  1836-pulse check, no pulse, CPR resumed  1837- 1 amp bicarb given  1838-epi given  1839-pulse check, no pulse CPR resumed  1840-pulse check, no pulse CPR resumed  1841-epi given  1842-pulse check no pulse, CPR resumed  1843-epi given  1844-pulse check, ROSC

## 2022-01-14 LAB — BLOOD CULTURE, ROUTINE: NORMAL

## 2022-01-27 ENCOUNTER — TELEPHONE (OUTPATIENT)
Dept: SURGERY | Age: 77
End: 2022-01-27

## (undated) DEVICE — PINNACLE INTRODUCER SHEATH: Brand: PINNACLE

## (undated) DEVICE — HI-TORQUE POWERTURN FLEX GUIDE WIRE W/HYDROPHILIC COATING .014" STRAIGHT TIP 190 CM: Brand: HI-TORQUE POWERTURN

## (undated) DEVICE — CANN CO2/O2 NASL A/

## (undated) DEVICE — GLOVE SURG SZ 7 CRM LTX FREE POLYISOPRENE POLYMER BEAD ANTI

## (undated) DEVICE — TUBE ET 7.5MM NSL ORAL BASIC CUF INTMED MURPHY EYE RADPQ

## (undated) DEVICE — DECANTER VI VENT W/ VLV FOR ASEP TRNSF OF FLD

## (undated) DEVICE — STAPLER INT DIA5MM 25 ABSRB STRP FIX DISP FOR HERN MESH

## (undated) DEVICE — SUTURE PROL SZ 0 L30IN NONABSORBABLE BLU L26MM CT-2 1/2 CIR 8412H

## (undated) DEVICE — Device

## (undated) DEVICE — COPILOT BLEEDBACK CONTROL VALVE: Brand: COPILOT

## (undated) DEVICE — TOOL INSRT GW MTL OR PLSTC

## (undated) DEVICE — STERILE POLYISOPRENE POWDER-FREE SURGICAL GLOVES: Brand: PROTEXIS

## (undated) DEVICE — GW PRESSUREWIRE X WIRELESS FFR 175CM

## (undated) DEVICE — DEV TORQ GW HOT/PINK

## (undated) DEVICE — MYNXGRIP 6F/7F: Brand: MYNXGRIP

## (undated) DEVICE — LARYNGOSCOPE VID MILLER 2 MTL BLADE M HNDL CURAPLEX

## (undated) DEVICE — SOLIDIFIER LIQUI LOC PLUS 2000CC

## (undated) DEVICE — GW PTCA ASAHI PROWATER .014 180CM

## (undated) DEVICE — INFLATION DEVICE: Brand: ENCORE™ 26

## (undated) DEVICE — ELECTRD PAD DEFIB A/

## (undated) DEVICE — 6F .070 JR 4 100CM: Brand: CORDIS

## (undated) DEVICE — SOL IRR NACL 0.9PCT BT 1000ML

## (undated) DEVICE — GENERAL LAP CDS

## (undated) DEVICE — PK CATH CARD 30 CA/4

## (undated) DEVICE — VERESS PNEUMOPERITONEUM NEEDLE: Brand: N.A.

## (undated) DEVICE — GW WHOLEY HITORQ MOD/J .035 175CM

## (undated) DEVICE — WOUND RETRACTOR AND PROTECTOR: Brand: ALEXIS O WOUND PROTECTOR-RETRACTOR

## (undated) DEVICE — GW STARTER FXD CORE J .035 3X150CM 3MM

## (undated) DEVICE — SOL NACL INJ 0.9PCT 50ML

## (undated) DEVICE — 3M™ IOBAN™ 2 ANTIMICROBIAL INCISE DRAPE 6650EZ: Brand: IOBAN™ 2

## (undated) DEVICE — DRAPE,UTILITY,XL,4/PK,STERILE: Brand: MEDLINE

## (undated) DEVICE — TROCAR: Brand: KII FIOS FIRST ENTRY

## (undated) DEVICE — MODEL BT2000 P/N 700287-012KIT CONTENTS: MANIFOLD WITH SALINE AND CONTRAST PORTS, SALINE TUBING WITH SPIKE AND HAND SYRINGE, TRANSDUCER: Brand: BT2000 AUTOMATED MANIFOLD KIT

## (undated) DEVICE — SUTURE PDS + SZ 0 L27IN ABSRB VLT L36MM CT 1 1 2 CIR PDP340H

## (undated) DEVICE — YANKAUER,POOLE TIP,STERILE,50/CS: Brand: MEDLINE

## (undated) DEVICE — Y-TYPE TUR/BLADDER IRRIGATION SET, REGULATING CLAMP

## (undated) DEVICE — FR5 INFINITI MULTIPAC: Brand: INFINITI

## (undated) DEVICE — TROCAR: Brand: KII® SLEEVE

## (undated) DEVICE — SUTURE ABSORBABLE MONOFILAMENT 3-0 SH 27 IN UD PDS + PDP416H

## (undated) DEVICE — 6F .070 JR 4 SH 100CM: Brand: VISTA BRITE TIP

## (undated) DEVICE — ST PRIM PUMP 20DRP 3VLV 127IN

## (undated) DEVICE — NEEDLE SPNL INJ 18 GAX1.5 IN

## (undated) DEVICE — DRSNG PRESS SAFEGUARD

## (undated) DEVICE — SOLUTION IV IRRIG POUR BRL 0.9% SODIUM CHL 2F7124

## (undated) DEVICE — SUTURE VCRL SZ 2-0 L18IN ABSRB UD CT-1 L36MM 1/2 CIR J839D

## (undated) DEVICE — SUTURE MCRYL SZ 4-0 L18IN ABSRB UD L19MM PS-2 3/8 CIR PRIM Y496G

## (undated) DEVICE — A2000 MULTI-USE SYRINGE KIT, P/N 701277-003KIT CONTENTS: 100ML CONTRAST RESERVOIR AND TUBING WITH CONTRAST SPIKE AND CLAMP: Brand: A2000 MULTI-USE SYRINGE KIT

## (undated) DEVICE — MODEL AT P65, P/N 701554-001KIT CONTENTS: HAND CONTROLLER, 3-WAY HIGH-PRESSURE STOPCOCK WITH ROTATING END AND PREMIUM HIGH-PRESSURE TUBING: Brand: ANGIOTOUCH® KIT

## (undated) DEVICE — ADHESIVE SKIN CLSR 0.7ML TOP DERMBND ADV

## (undated) DEVICE — PTCH HEMOCON PRO 2X2IN

## (undated) DEVICE — MAJOR CDS

## (undated) DEVICE — SUTURE SZ 0 27IN 5/8 CIR UR-6  TAPER PT VIOLET ABSRB VICRYL J603H

## (undated) DEVICE — TOTAL TRAY, DB, 100% SILI FOLEY, 16FR 10: Brand: MEDLINE

## (undated) DEVICE — DRESSING TRNSPAR W5XL4.5IN FLM SHT SEMIPERMEABLE WIND

## (undated) DEVICE — SEALER LAP L37CM MARYLAND JAW OPN NANO COAT MULTIFUNCTIONAL

## (undated) DEVICE — TUBE ET 8MM NSL ORAL BASIC CUF INTMED MURPHY EYE RADPQ MRK

## (undated) DEVICE — TROCAR: Brand: KII SHIELDED BLADED ACCESS SYSTEM

## (undated) DEVICE — SUTURE PERMAHAND SZ 3-0 L18IN NONABSORBABLE BLK L26MM SH C013D

## (undated) DEVICE — SUTURE ETHLN SZ 2-0 L30IN NONABSORBABLE BLK L36MM FSLX 3/8 1674H

## (undated) DEVICE — GC 7F 078 JL 4: Brand: VISTA BRITE TIP

## (undated) DEVICE — TIBURON LAPAROTOMY DRAPE: Brand: CONVERTORS

## (undated) DEVICE — PENCIL SMK EVAC 10 FT BLADE ELECTRD ROCKER FOR TELSCP

## (undated) DEVICE — ADAPTER,CATHETER/SYRINGE/LUER,STERILE: Brand: MEDLINE

## (undated) DEVICE — SUTURE MCRYL + SZ 4-0 L18IN ABSRB UD L19MM PS-2 3/8 CIR MCP496G

## (undated) DEVICE — SUTURE PDS + SZ 1 L96IN ABSRB VLT L65MM TP-1 1/2 CIR PDP880G

## (undated) DEVICE — SUTURE VCRL SZ 3-0 L27IN ABSRB UD L26MM SH 1/2 CIR J416H

## (undated) DEVICE — SUTURE VCRL SZ 0 L27IN ABSRB VLT L36MM UR-5 5/8 CIR J376H

## (undated) DEVICE — IMMOB KN 3PNL DLX CANVS 19IN BLU

## (undated) DEVICE — PK CATH CARD 30